# Patient Record
Sex: MALE | Race: WHITE | Employment: FULL TIME | URBAN - METROPOLITAN AREA
[De-identification: names, ages, dates, MRNs, and addresses within clinical notes are randomized per-mention and may not be internally consistent; named-entity substitution may affect disease eponyms.]

---

## 2017-04-14 ENCOUNTER — HOSPITAL ENCOUNTER (EMERGENCY)
Facility: HOSPITAL | Age: 52
Discharge: STILL ADMITTED OR ADMINISTRATIVE | End: 2017-04-14

## 2017-04-14 ENCOUNTER — HOSPITAL ENCOUNTER (EMERGENCY)
Facility: HOSPITAL | Age: 52
Discharge: HOME | End: 2017-04-14
Attending: EMERGENCY MEDICINE
Payer: COMMERCIAL

## 2017-04-14 ENCOUNTER — APPOINTMENT (OUTPATIENT)
Dept: CT IMAGING | Facility: HOSPITAL | Age: 52
End: 2017-04-14
Attending: EMERGENCY MEDICINE
Payer: COMMERCIAL

## 2017-04-14 VITALS
DIASTOLIC BLOOD PRESSURE: 92 MMHG | HEART RATE: 63 BPM | WEIGHT: 181 LBS | HEIGHT: 66 IN | BODY MASS INDEX: 29.09 KG/M2 | OXYGEN SATURATION: 99 % | SYSTOLIC BLOOD PRESSURE: 142 MMHG | RESPIRATION RATE: 14 BRPM | TEMPERATURE: 98 F

## 2017-04-14 DIAGNOSIS — I88.0 MESENTERIC ADENITIS: Primary | ICD-10-CM

## 2017-04-14 PROCEDURE — 99285 EMERGENCY DEPT VISIT HI MDM: CPT

## 2017-04-14 PROCEDURE — 96375 TX/PRO/DX INJ NEW DRUG ADDON: CPT

## 2017-04-14 PROCEDURE — 81003 URINALYSIS AUTO W/O SCOPE: CPT | Performed by: EMERGENCY MEDICINE

## 2017-04-14 PROCEDURE — 74176 CT ABD & PELVIS W/O CONTRAST: CPT

## 2017-04-14 PROCEDURE — 80053 COMPREHEN METABOLIC PANEL: CPT | Performed by: EMERGENCY MEDICINE

## 2017-04-14 PROCEDURE — 83690 ASSAY OF LIPASE: CPT | Performed by: EMERGENCY MEDICINE

## 2017-04-14 PROCEDURE — 99284 EMERGENCY DEPT VISIT MOD MDM: CPT

## 2017-04-14 PROCEDURE — 96376 TX/PRO/DX INJ SAME DRUG ADON: CPT

## 2017-04-14 PROCEDURE — 85025 COMPLETE CBC W/AUTO DIFF WBC: CPT | Performed by: EMERGENCY MEDICINE

## 2017-04-14 PROCEDURE — 96374 THER/PROPH/DIAG INJ IV PUSH: CPT

## 2017-04-14 RX ORDER — HYDROMORPHONE HYDROCHLORIDE 1 MG/ML
1 INJECTION, SOLUTION INTRAMUSCULAR; INTRAVENOUS; SUBCUTANEOUS ONCE
Status: COMPLETED | OUTPATIENT
Start: 2017-04-14 | End: 2017-04-14

## 2017-04-14 RX ORDER — KETOROLAC TROMETHAMINE 30 MG/ML
30 INJECTION, SOLUTION INTRAMUSCULAR; INTRAVENOUS ONCE
Status: COMPLETED | OUTPATIENT
Start: 2017-04-14 | End: 2017-04-14

## 2017-04-14 RX ORDER — TRAMADOL HYDROCHLORIDE 50 MG/1
50 TABLET ORAL EVERY 6 HOURS PRN
COMMUNITY

## 2017-04-14 RX ORDER — HYDROCODONE BITARTRATE AND ACETAMINOPHEN 5; 325 MG/1; MG/1
1-2 TABLET ORAL EVERY 4 HOURS PRN
Qty: 10 TABLET | Refills: 0 | Status: SHIPPED | OUTPATIENT
Start: 2017-04-14 | End: 2017-04-21

## 2017-04-14 RX ORDER — ONDANSETRON 2 MG/ML
4 INJECTION INTRAMUSCULAR; INTRAVENOUS ONCE
Status: COMPLETED | OUTPATIENT
Start: 2017-04-14 | End: 2017-04-14

## 2017-04-14 NOTE — ED NOTES
Received report from [facility]. Pt resting on stretcher with c/o left flank/back/testicle pain 6/10. Pt stating he was attempting to go to work when pain increased.  Stating he did not sleep last night due to pain onset 1300

## 2017-04-14 NOTE — ED INITIAL ASSESSMENT (HPI)
Patient is a 47 yo  male with c/o R flank pain since last night. Patient states that pain radiates from R flank to RLQ abd and R testicle. Patient states that he has past md hx of renal calculi and diverticulitis.

## 2017-04-14 NOTE — ED PROVIDER NOTES
Patient Seen in: [facility] Emergency Department    History   Patient presents with:  Abdomen/Flank Pain (GI/)    Stated Complaint: LEFT FLANK PAIN    HPI    35-year-old  male who presents emergency room today for complaint of left flank p above.    PSFH elements reviewed from today and agreed except as otherwise stated in HPI.     Physical Exam       ED Triage Vitals   BP 04/14/17 0640 145/91 mmHg   Pulse 04/14/17 0640 72   Resp 04/14/17 0640 18   Temp 04/14/17 0640 97.9 °F (36.6 °C)   Temp Please view results for these tests on the individual orders.    URINALYSIS WITH CULTURE REFLEX    CT scan of the abdomen pelvis reveals:       FINDINGS:    KIDNEYS:  Bilateral nonobstructing renal calcifications.    ADRENALS:  No mass or enl Patient states understanding these instructions and was discharged home.         Disposition and Plan     Clinical Impression:  Mesenteric adenitis  (primary encounter diagnosis)    Disposition:  Discharge    Follow-up:  Eddie Ojeda, [address]

## 2017-04-21 ENCOUNTER — CHARTING TRANS (OUTPATIENT)
Dept: OTHER | Age: 52
End: 2017-04-21

## 2017-04-21 ENCOUNTER — LAB SERVICES (OUTPATIENT)
Dept: OTHER | Age: 52
End: 2017-04-21

## 2017-04-21 ASSESSMENT — PAIN SCALES - GENERAL: PAINLEVEL_OUTOF10: 7

## 2017-04-22 ENCOUNTER — CHARTING TRANS (OUTPATIENT)
Dept: OTHER | Age: 52
End: 2017-04-22

## 2017-04-22 LAB
ALBUMIN SERPL-MCNC: 4.3 G/DL (ref 3.6–5.1)
ALBUMIN/GLOB SERPL: 1.5 (ref 1–2.4)
ALP SERPL-CCNC: 101 UNITS/L (ref 45–117)
ALT SERPL-CCNC: 28 UNITS/L
ANION GAP SERPL CALC-SCNC: 16 MMOL/L (ref 10–20)
APPEARANCE UR: CLEAR
AST SERPL-CCNC: 16 UNITS/L
BASOPHILS # BLD: 0.1 K/MCL (ref 0–0.3)
BASOPHILS NFR BLD: 1 %
BILIRUB SERPL-MCNC: 1.1 MG/DL (ref 0.2–1)
BILIRUB UR QL: NEGATIVE
BUN SERPL-MCNC: 10 MG/DL (ref 6–20)
BUN/CREAT SERPL: 13 (ref 7–25)
CALCIUM SERPL-MCNC: 9.3 MG/DL (ref 8.4–10.2)
CHLORIDE SERPL-SCNC: 106 MMOL/L (ref 98–107)
CHOLEST SERPL-MCNC: 195 MG/DL
CHOLEST/HDLC SERPL: 3.7
CO2 SERPL-SCNC: 25 MMOL/L (ref 21–32)
COLOR UR: YELLOW
CREAT SERPL-MCNC: 0.8 MG/DL (ref 0.67–1.17)
DIFFERENTIAL METHOD BLD: NORMAL
EOSINOPHIL # BLD: 0.2 K/MCL (ref 0.1–0.5)
EOSINOPHIL NFR BLD: 3 %
ERYTHROCYTE [DISTWIDTH] IN BLOOD: 13.4 % (ref 11–15)
GLOBULIN SER-MCNC: 2.9 G/DL (ref 2–4)
GLUCOSE SERPL-MCNC: 88 MG/DL (ref 65–99)
GLUCOSE UR-MCNC: NEGATIVE MG/DL
HDLC SERPL-MCNC: 53 MG/DL
HEMATOCRIT: 45.3 % (ref 39–51)
HEMOGLOBIN: 15.5 G/DL (ref 13–17)
KETONES UR-MCNC: NEGATIVE MG/DL
LDLC SERPL CALC-MCNC: 126 MG/DL
LENGTH OF FAST TIME PATIENT: 8 HRS
LENGTH OF FAST TIME PATIENT: 8 HRS
LYMPHOCYTES # BLD: 2.3 K/MCL (ref 1–4)
LYMPHOCYTES NFR BLD: 27 %
MEAN CORPUSCULAR HEMOGLOBIN: 29.4 PG (ref 26–34)
MEAN CORPUSCULAR HGB CONC: 34.2 G/DL (ref 32–36.5)
MEAN CORPUSCULAR VOLUME: 86 FL (ref 78–100)
MONOCYTES # BLD: 0.6 K/MCL (ref 0.3–0.9)
MONOCYTES NFR BLD: 7 %
NEUTROPHILS # BLD: 5.5 K/MCL (ref 1.8–7.7)
NEUTROPHILS NFR BLD: 62 %
NITRITE UR QL: NEGATIVE
NONHDLC SERPL-MCNC: 142 MG/DL
PH UR: 6 UNITS (ref 5–7)
PLATELET COUNT: 160 K/MCL (ref 140–450)
POTASSIUM SERPL-SCNC: 4.7 MMOL/L (ref 3.4–5.1)
PROT UR QL: NEGATIVE MG/DL
PSA SERPL-MCNC: 0.85 NG/ML
RBC-URINE: NEGATIVE
RED CELL COUNT: 5.27 MIL/MCL (ref 4.5–5.9)
SODIUM SERPL-SCNC: 142 MMOL/L (ref 135–145)
SP GR UR: 1.02 (ref 1–1.03)
SPECIMEN SOURCE: NORMAL
TOTAL PROTEIN: 7.2 G/DL (ref 6.4–8.2)
TRIGL SERPL-MCNC: 82 MG/DL
TSH SERPL-ACNC: 0.88 MCUNITS/ML (ref 0.35–5)
UROBILINOGEN UR QL: 0.2 MG/DL (ref 0–1)
WBC-URINE: NEGATIVE
WHITE BLOOD COUNT: 8.7 K/MCL (ref 4.2–11)

## 2017-05-04 ENCOUNTER — LAB SERVICES (OUTPATIENT)
Dept: OTHER | Age: 52
End: 2017-05-04

## 2017-05-04 ENCOUNTER — HOSPITAL (OUTPATIENT)
Dept: OTHER | Age: 52
End: 2017-05-04
Attending: FAMILY MEDICINE

## 2017-05-04 ENCOUNTER — IMAGING SERVICES (OUTPATIENT)
Dept: OTHER | Age: 52
End: 2017-05-04

## 2017-05-04 LAB
ANALYZER ANC (IANC): ABNORMAL
ANALYZER ANC (IANC): ABNORMAL
ANION GAP SERPL CALC-SCNC: 18 MMOL/L (ref 10–20)
ANION GAP SERPL CALC-SCNC: 18 MMOL/L (ref 10–20)
BASO+EOS+MONOS # BLD: 0.6 K/MCL (ref 0.1–1.1)
BASO+EOS+MONOS # BLD: 0.6 THOUSAND/MCL (ref 0.1–1.1)
BASO+EOS+MONOS NFR BLD: 5 %
BASO+EOS+MONOS NFR BLD: 5 %
BUN SERPL-MCNC: 11 MG/DL (ref 6–20)
BUN SERPL-MCNC: 11 MG/DL (ref 6–20)
BUN/CREAT SERPL: 12 (ref 7–25)
BUN/CREAT SERPL: 12 (ref 7–25)
CHLORIDE SERPL-SCNC: 101 MMOL/L (ref 98–107)
CHLORIDE: 101 MMOL/L (ref 98–107)
CO2 SERPL-SCNC: 26 MMOL/L (ref 21–32)
CO2 SERPL-SCNC: 26 MMOL/L (ref 21–32)
CREAT SERPL-MCNC: 0.9 MG/DL (ref 0.67–1.17)
CREAT SERPL-MCNC: 0.9 MG/DL (ref 0.67–1.17)
DIFFERENTIAL METHOD BLD: ABNORMAL
DIFFERENTIAL METHOD BLD: ABNORMAL
ERYTHROCYTE [DISTWIDTH] IN BLOOD: 13.7 % (ref 11–15)
ERYTHROCYTE [DISTWIDTH] IN BLOOD: 13.7 % (ref 11–15)
GLUCOSE SERPL-MCNC: 101 MG/DL (ref 65–99)
GLUCOSE SERPL-MCNC: 101 MG/DL (ref 65–99)
HEMATOCRIT: 44 % (ref 39–51)
HEMATOCRIT: 44 % (ref 39–51)
HEMOGLOBIN: 15.6 G/DL (ref 13–17)
HGB BLD-MCNC: 15.6 GM/DL (ref 13–17)
LYMPHOCYTES # BLD: 2.8 K/MCL (ref 1–4)
LYMPHOCYTES # BLD: 2.8 THOUSAND/MCL (ref 1–4)
LYMPHOCYTES NFR BLD: 25 %
LYMPHOCYTES NFR BLD: 25 %
MCH RBC QN AUTO: 29.8 PG (ref 26–34)
MCHC RBC AUTO-ENTMCNC: 35.5 GM/DL (ref 32–36.5)
MCV RBC AUTO: 84 FL (ref 78–100)
MEAN CORPUSCULAR HEMOGLOBIN: 29.8 PG (ref 26–34)
MEAN CORPUSCULAR HGB CONC: 35.5 G/DL (ref 32–36.5)
MEAN CORPUSCULAR VOLUME: 84 FL (ref 78–100)
NEUTROPHILS # BLD: 8.1 K/MCL (ref 1.8–7.7)
NEUTROPHILS # BLD: 8.1 THOUSAND/MCL (ref 1.8–7.7)
NEUTROPHILS NFR BLD: 70 %
NEUTROPHILS NFR BLD: 70 %
NEUTS SEG NFR BLD: ABNORMAL
NEUTS SEG NFR BLD: ABNORMAL %
PLATELET # BLD: 144 THOUSAND/MCL (ref 140–450)
PLATELET COUNT: 144 K/MCL (ref 140–450)
POTASSIUM SERPL-SCNC: 4.1 MMOL/L (ref 3.4–5.1)
POTASSIUM SERPL-SCNC: 4.1 MMOL/L (ref 3.4–5.1)
RBC # BLD: 5.24 MILLION/MCL (ref 4.5–5.9)
RED CELL COUNT: 5.24 MIL/MCL (ref 4.5–5.9)
SODIUM SERPL-SCNC: 141 MMOL/L (ref 135–145)
SODIUM SERPL-SCNC: 141 MMOL/L (ref 135–145)
WBC # BLD: 11.5 THOUSAND/MCL (ref 4.2–11)
WHITE BLOOD COUNT: 11.5 K/MCL (ref 4.2–11)

## 2017-06-12 ENCOUNTER — HOSPITAL ENCOUNTER (EMERGENCY)
Facility: HOSPITAL | Age: 52
Discharge: HOME | End: 2017-06-12
Attending: EMERGENCY MEDICINE
Payer: COMMERCIAL

## 2017-06-12 ENCOUNTER — HOSPITAL ENCOUNTER (EMERGENCY)
Facility: HOSPITAL | Age: 52
Discharge: STILL ADMITTED OR ADMINISTRATIVE | End: 2017-06-12

## 2017-06-12 ENCOUNTER — APPOINTMENT (OUTPATIENT)
Dept: CT IMAGING | Facility: HOSPITAL | Age: 52
End: 2017-06-12
Attending: EMERGENCY MEDICINE
Payer: COMMERCIAL

## 2017-06-12 ENCOUNTER — APPOINTMENT (OUTPATIENT)
Dept: ULTRASOUND IMAGING | Facility: HOSPITAL | Age: 52
End: 2017-06-12
Attending: EMERGENCY MEDICINE
Payer: COMMERCIAL

## 2017-06-12 VITALS
HEIGHT: 66 IN | OXYGEN SATURATION: 98 % | DIASTOLIC BLOOD PRESSURE: 95 MMHG | WEIGHT: 186 LBS | HEART RATE: 65 BPM | SYSTOLIC BLOOD PRESSURE: 125 MMHG | TEMPERATURE: 97 F | BODY MASS INDEX: 29.89 KG/M2 | RESPIRATION RATE: 16 BRPM

## 2017-06-12 DIAGNOSIS — R10.13 EPIGASTRIC PAIN: Primary | ICD-10-CM

## 2017-06-12 PROCEDURE — 96374 THER/PROPH/DIAG INJ IV PUSH: CPT

## 2017-06-12 PROCEDURE — 96361 HYDRATE IV INFUSION ADD-ON: CPT

## 2017-06-12 PROCEDURE — 84484 ASSAY OF TROPONIN QUANT: CPT | Performed by: EMERGENCY MEDICINE

## 2017-06-12 PROCEDURE — 96376 TX/PRO/DX INJ SAME DRUG ADON: CPT

## 2017-06-12 PROCEDURE — 85025 COMPLETE CBC W/AUTO DIFF WBC: CPT | Performed by: EMERGENCY MEDICINE

## 2017-06-12 PROCEDURE — 96375 TX/PRO/DX INJ NEW DRUG ADDON: CPT

## 2017-06-12 PROCEDURE — 80053 COMPREHEN METABOLIC PANEL: CPT | Performed by: EMERGENCY MEDICINE

## 2017-06-12 PROCEDURE — 83690 ASSAY OF LIPASE: CPT | Performed by: EMERGENCY MEDICINE

## 2017-06-12 PROCEDURE — 76700 US EXAM ABDOM COMPLETE: CPT | Performed by: EMERGENCY MEDICINE

## 2017-06-12 PROCEDURE — 99285 EMERGENCY DEPT VISIT HI MDM: CPT

## 2017-06-12 PROCEDURE — 81001 URINALYSIS AUTO W/SCOPE: CPT | Performed by: EMERGENCY MEDICINE

## 2017-06-12 PROCEDURE — 93005 ELECTROCARDIOGRAM TRACING: CPT

## 2017-06-12 PROCEDURE — 93010 ELECTROCARDIOGRAM REPORT: CPT

## 2017-06-12 PROCEDURE — 74176 CT ABD & PELVIS W/O CONTRAST: CPT | Performed by: EMERGENCY MEDICINE

## 2017-06-12 PROCEDURE — 87086 URINE CULTURE/COLONY COUNT: CPT | Performed by: EMERGENCY MEDICINE

## 2017-06-12 RX ORDER — HYDROMORPHONE HYDROCHLORIDE 1 MG/ML
1 INJECTION, SOLUTION INTRAMUSCULAR; INTRAVENOUS; SUBCUTANEOUS EVERY 30 MIN PRN
Status: DISCONTINUED | OUTPATIENT
Start: 2017-06-12 | End: 2017-06-13

## 2017-06-12 RX ORDER — HYDROCODONE BITARTRATE AND ACETAMINOPHEN 5; 325 MG/1; MG/1
1-2 TABLET ORAL EVERY 4 HOURS PRN
Qty: 20 TABLET | Refills: 0 | Status: SHIPPED | OUTPATIENT
Start: 2017-06-12 | End: 2017-06-19

## 2017-06-12 RX ORDER — KETOROLAC TROMETHAMINE 30 MG/ML
30 INJECTION, SOLUTION INTRAMUSCULAR; INTRAVENOUS ONCE
Status: COMPLETED | OUTPATIENT
Start: 2017-06-12 | End: 2017-06-12

## 2017-06-12 RX ORDER — ONDANSETRON 2 MG/ML
4 INJECTION INTRAMUSCULAR; INTRAVENOUS ONCE
Status: COMPLETED | OUTPATIENT
Start: 2017-06-12 | End: 2017-06-12

## 2017-06-12 RX ORDER — SODIUM CHLORIDE 9 MG/ML
INJECTION, SOLUTION INTRAVENOUS ONCE
Status: COMPLETED | OUTPATIENT
Start: 2017-06-12 | End: 2017-06-12

## 2017-06-13 NOTE — ED PROVIDER NOTES
Patient Seen in: [facility] Emergency Department    History   Patient presents with:  Abdomen/Flank Pain (GI/)    Stated Complaint: ABD PAIN    HPI  Patient is a 51-year-old male who states that 4 PM today had acute onset of epigastric right upper ED Triage Vitals   BP 06/12/17 1946 171/85 mmHg   Pulse 06/12/17 1946 73   Resp 06/12/17 1946 16   Temp 06/12/17 1946 97.3 °F (36.3 °C)   Temp src 06/12/17 1946 Temporal   SpO2 06/12/17 1946 100 %   O2 Device 06/12/17 1946 None (Room air)       Current Final result                 Please view results for these tests on the individual orders.    RAINBOW DRAW BLUE   RAINBOW DRAW GOLD   RAINBOW DRAW LAVENDER   RAINBOW DRAW LIGHT GREEN   URINE CULTURE, ROUTINE   CBC W/ DIFFERENTIAL      EKG    Rate, intervals Impression:  Epigastric pain  (primary encounter diagnosis)    Disposition:  Discharge    Follow-up:  Cedric Hathaway, [address] [place] [address]  [phone]    In 2 days        Medications Prescribed:  Discharge Medication List as of 6/12/

## 2017-08-07 ENCOUNTER — HOSPITAL (OUTPATIENT)
Dept: OTHER | Age: 52
End: 2017-08-07
Attending: EMERGENCY MEDICINE

## 2017-08-13 ENCOUNTER — HOSPITAL ENCOUNTER (EMERGENCY)
Facility: HOSPITAL | Age: 52
Discharge: HOME | End: 2017-08-14
Attending: EMERGENCY MEDICINE
Payer: COMMERCIAL

## 2017-08-13 DIAGNOSIS — K80.50 BILIARY COLIC: Primary | ICD-10-CM

## 2017-08-13 PROCEDURE — 99285 EMERGENCY DEPT VISIT HI MDM: CPT

## 2017-08-13 PROCEDURE — 83690 ASSAY OF LIPASE: CPT | Performed by: EMERGENCY MEDICINE

## 2017-08-13 PROCEDURE — 96376 TX/PRO/DX INJ SAME DRUG ADON: CPT

## 2017-08-13 PROCEDURE — 85025 COMPLETE CBC W/AUTO DIFF WBC: CPT | Performed by: EMERGENCY MEDICINE

## 2017-08-13 PROCEDURE — 96375 TX/PRO/DX INJ NEW DRUG ADDON: CPT

## 2017-08-13 PROCEDURE — 81001 URINALYSIS AUTO W/SCOPE: CPT | Performed by: EMERGENCY MEDICINE

## 2017-08-13 PROCEDURE — 96361 HYDRATE IV INFUSION ADD-ON: CPT

## 2017-08-13 PROCEDURE — 96374 THER/PROPH/DIAG INJ IV PUSH: CPT

## 2017-08-13 PROCEDURE — 93010 ELECTROCARDIOGRAM REPORT: CPT

## 2017-08-13 PROCEDURE — 80053 COMPREHEN METABOLIC PANEL: CPT | Performed by: EMERGENCY MEDICINE

## 2017-08-13 PROCEDURE — 93005 ELECTROCARDIOGRAM TRACING: CPT

## 2017-08-14 ENCOUNTER — APPOINTMENT (OUTPATIENT)
Dept: ULTRASOUND IMAGING | Facility: HOSPITAL | Age: 52
End: 2017-08-14
Attending: EMERGENCY MEDICINE
Payer: COMMERCIAL

## 2017-08-14 VITALS
DIASTOLIC BLOOD PRESSURE: 81 MMHG | BODY MASS INDEX: 30.22 KG/M2 | HEART RATE: 63 BPM | RESPIRATION RATE: 15 BRPM | WEIGHT: 188 LBS | SYSTOLIC BLOOD PRESSURE: 110 MMHG | HEIGHT: 66 IN | OXYGEN SATURATION: 99 % | TEMPERATURE: 98 F

## 2017-08-14 LAB
ALBUMIN SERPL-MCNC: 3.8 G/DL (ref 3.5–4.8)
ALP LIVER SERPL-CCNC: 90 U/L (ref 45–117)
ALT SERPL-CCNC: 28 U/L (ref 17–63)
AST SERPL-CCNC: 19 U/L (ref 15–41)
ATRIAL RATE: 68 BPM
BASOPHILS # BLD AUTO: 0.09 X10(3) UL (ref 0–0.1)
BASOPHILS NFR BLD AUTO: 0.9 %
BILIRUB SERPL-MCNC: 0.8 MG/DL (ref 0.1–2)
BILIRUB UR QL STRIP.AUTO: NEGATIVE
BUN BLD-MCNC: 7 MG/DL (ref 8–20)
CALCIUM BLD-MCNC: 8.9 MG/DL (ref 8.3–10.3)
CHLORIDE: 107 MMOL/L (ref 101–111)
CLARITY UR REFRACT.AUTO: CLEAR
CO2: 26 MMOL/L (ref 22–32)
CREAT BLD-MCNC: 0.79 MG/DL (ref 0.7–1.3)
EOSINOPHIL # BLD AUTO: 0.32 X10(3) UL (ref 0–0.3)
EOSINOPHIL NFR BLD AUTO: 3.2 %
ERYTHROCYTE [DISTWIDTH] IN BLOOD BY AUTOMATED COUNT: 13.4 % (ref 11.5–16)
GLUCOSE BLD-MCNC: 131 MG/DL (ref 70–99)
GLUCOSE UR STRIP.AUTO-MCNC: NEGATIVE MG/DL
HCT VFR BLD AUTO: 41.9 % (ref 37–53)
HGB BLD-MCNC: 14.3 G/DL (ref 13–17)
IMMATURE GRANULOCYTE COUNT: 0.03 X10(3) UL (ref 0–1)
IMMATURE GRANULOCYTE RATIO %: 0.3 %
KETONES UR STRIP.AUTO-MCNC: NEGATIVE MG/DL
LEUKOCYTE ESTERASE UR QL STRIP.AUTO: NEGATIVE
LIPASE: 159 U/L (ref 73–393)
LYMPHOCYTES # BLD AUTO: 2.87 X10(3) UL (ref 0.9–4)
LYMPHOCYTES NFR BLD AUTO: 28.8 %
M PROTEIN MFR SERPL ELPH: 7.2 G/DL (ref 6.1–8.3)
MCH RBC QN AUTO: 29.1 PG (ref 27–33.2)
MCHC RBC AUTO-ENTMCNC: 34.1 G/DL (ref 31–37)
MCV RBC AUTO: 85.2 FL (ref 80–99)
MONOCYTES # BLD AUTO: 0.7 X10(3) UL (ref 0.1–0.6)
MONOCYTES NFR BLD AUTO: 7 %
NEUTROPHIL ABS PRELIM: 5.95 X10 (3) UL (ref 1.3–6.7)
NEUTROPHILS # BLD AUTO: 5.95 X10(3) UL (ref 1.3–6.7)
NEUTROPHILS NFR BLD AUTO: 59.8 %
NITRITE UR QL STRIP.AUTO: NEGATIVE
P AXIS: 24 DEGREES
P-R INTERVAL: 142 MS
PH UR STRIP.AUTO: 7 [PH] (ref 4.5–8)
PLATELET # BLD AUTO: 172 10(3)UL (ref 150–450)
POTASSIUM SERPL-SCNC: 3.8 MMOL/L (ref 3.6–5.1)
PROT UR STRIP.AUTO-MCNC: NEGATIVE MG/DL
Q-T INTERVAL: 370 MS
QRS DURATION: 92 MS
QTC CALCULATION (BEZET): 393 MS
R AXIS: 7 DEGREES
RBC # BLD AUTO: 4.92 X10(6)UL (ref 4.3–5.7)
RED CELL DISTRIBUTION WIDTH-SD: 41.7 FL (ref 35.1–46.3)
SODIUM SERPL-SCNC: 142 MMOL/L (ref 136–144)
SP GR UR STRIP.AUTO: 1.01 (ref 1–1.03)
T AXIS: 33 DEGREES
UROBILINOGEN UR STRIP.AUTO-MCNC: <2 MG/DL
VENTRICULAR RATE: 68 BPM
WBC # BLD AUTO: 10 X10(3) UL (ref 4–13)

## 2017-08-14 PROCEDURE — 76700 US EXAM ABDOM COMPLETE: CPT | Performed by: EMERGENCY MEDICINE

## 2017-08-14 RX ORDER — ONDANSETRON 2 MG/ML
4 INJECTION INTRAMUSCULAR; INTRAVENOUS ONCE
Status: COMPLETED | OUTPATIENT
Start: 2017-08-14 | End: 2017-08-14

## 2017-08-14 RX ORDER — HYDROCODONE BITARTRATE AND ACETAMINOPHEN 5; 325 MG/1; MG/1
1-2 TABLET ORAL EVERY 6 HOURS PRN
Qty: 20 TABLET | Refills: 0 | Status: SHIPPED | OUTPATIENT
Start: 2017-08-14 | End: 2017-08-21

## 2017-08-14 RX ORDER — HYDROMORPHONE HYDROCHLORIDE 1 MG/ML
1 INJECTION, SOLUTION INTRAMUSCULAR; INTRAVENOUS; SUBCUTANEOUS EVERY 30 MIN PRN
Status: DISCONTINUED | OUTPATIENT
Start: 2017-08-14 | End: 2017-08-14

## 2017-08-14 NOTE — ED PROVIDER NOTES
Patient Seen in: [facility] Emergency Department    History   Patient presents with:  Abdomen/Flank Pain (GI/)    Stated Complaint: abd pain    HPI    Patient is a 51-year-old male complaining of 2 hours of severe right upper quadrant nonradiating as otherwise stated in HPI.     Physical Exam   ED Triage Vitals [08/13/17 2340]  BP: 157/94  Pulse: 73  Resp: 20  Temp: 97.8 °F (36.6 °C)  Temp src: Temporal  SpO2: 99 %  O2 Device: None (Room air)    Current:/81   Pulse 63   Temp 97.8 °F (36.6 °C) ( Normal   CBC WITH DIFFERENTIAL WITH PLATELET    Narrative: The following orders were created for panel order CBC WITH DIFFERENTIAL WITH PLATELET.   Procedure                               Abnormality         Status                     --------- and Plan     Clinical Impression:  Biliary colic  (primary encounter diagnosis)    Disposition:  Discharge    Follow-up:  Yasmin Shin [address] [place] [address]  [phone]    Call in 1 day        Medications Prescribed:

## 2017-08-14 NOTE — ED INITIAL ASSESSMENT (HPI)
PT here with c/o abdominal pain for 2 hours. Pt presently on Amoxicillin for dental infection. Pt describes pain as epigastric.

## 2017-08-19 ENCOUNTER — LAB SERVICES (OUTPATIENT)
Dept: OTHER | Age: 52
End: 2017-08-19

## 2017-08-19 LAB
ALBUMIN SERPL-MCNC: 4.2 G/DL (ref 3.6–5.1)
ALBUMIN SERPL-MCNC: 4.2 GM/DL (ref 3.6–5.1)
ALP SERPL-CCNC: 105 UNIT/L (ref 45–117)
ALP SERPL-CCNC: 105 UNITS/L (ref 45–117)
ALT SERPL-CCNC: 25 UNIT/L
ALT SERPL-CCNC: 25 UNITS/L
ANALYZER ANC (IANC): NORMAL
ANALYZER ANC (IANC): NORMAL
ANION GAP SERPL CALC-SCNC: 14 MMOL/L (ref 10–20)
ANION GAP SERPL CALC-SCNC: 14 MMOL/L (ref 10–20)
AST SERPL-CCNC: 26 UNIT/L
AST SERPL-CCNC: 26 UNITS/L
BASOPHILS # BLD: 0.1 K/MCL (ref 0–0.3)
BASOPHILS # BLD: 0.1 THOUSAND/MCL (ref 0–0.3)
BASOPHILS NFR BLD: 1 %
BASOPHILS NFR BLD: 1 %
BILIRUB CONJ SERPL-MCNC: <0.1 MG/DL (ref 0–0.2)
BILIRUB CONJ SERPL-MCNC: <0.1 MG/DL (ref 0–0.2)
BILIRUB SERPL-MCNC: 0.9 MG/DL (ref 0.2–1)
BILIRUB SERPL-MCNC: 0.9 MG/DL (ref 0.2–1)
BUN SERPL-MCNC: 7 MG/DL (ref 6–20)
BUN SERPL-MCNC: 7 MG/DL (ref 6–20)
BUN/CREAT SERPL: 9 (ref 7–25)
BUN/CREAT SERPL: 9 (ref 7–25)
CALCIUM SERPL-MCNC: 9.4 MG/DL (ref 8.4–10.2)
CALCIUM SERPL-MCNC: 9.4 MG/DL (ref 8.4–10.2)
CHLORIDE SERPL-SCNC: 104 MMOL/L (ref 98–107)
CHLORIDE: 104 MMOL/L (ref 98–107)
CO2 SERPL-SCNC: 29 MMOL/L (ref 21–32)
CO2 SERPL-SCNC: 29 MMOL/L (ref 21–32)
CREAT SERPL-MCNC: 0.74 MG/DL (ref 0.67–1.17)
CREAT SERPL-MCNC: 0.74 MG/DL (ref 0.67–1.17)
DIFFERENTIAL METHOD BLD: NORMAL
DIFFERENTIAL METHOD BLD: NORMAL
EOSINOPHIL # BLD: 0.4 K/MCL (ref 0.1–0.5)
EOSINOPHIL # BLD: 0.4 THOUSAND/MCL (ref 0.1–0.5)
EOSINOPHIL NFR BLD: 4 %
EOSINOPHIL NFR BLD: 4 %
ERYTHROCYTE [DISTWIDTH] IN BLOOD: 13.1 % (ref 11–15)
ERYTHROCYTE [DISTWIDTH] IN BLOOD: 13.1 % (ref 11–15)
GLUCOSE SERPL-MCNC: 117 MG/DL (ref 65–99)
GLUCOSE SERPL-MCNC: 117 MG/DL (ref 65–99)
HEMATOCRIT: 43.8 % (ref 39–51)
HEMATOCRIT: 43.8 % (ref 39–51)
HEMOGLOBIN: 15.4 G/DL (ref 13–17)
HGB BLD-MCNC: 15.4 GM/DL (ref 13–17)
LIPASE SERPL-CCNC: 224 UNIT/L (ref 73–393)
LIPASE SERPL-CCNC: 224 UNITS/L (ref 73–393)
LYMPHOCYTES # BLD: 3.4 K/MCL (ref 1–4)
LYMPHOCYTES # BLD: 3.4 THOUSAND/MCL (ref 1–4)
LYMPHOCYTES NFR BLD: 35 %
LYMPHOCYTES NFR BLD: 35 %
MCH RBC QN AUTO: 29.9 PG (ref 26–34)
MCHC RBC AUTO-ENTMCNC: 35.2 GM/DL (ref 32–36.5)
MCV RBC AUTO: 85 FL (ref 78–100)
MEAN CORPUSCULAR HEMOGLOBIN: 29.9 PG (ref 26–34)
MEAN CORPUSCULAR HGB CONC: 35.2 G/DL (ref 32–36.5)
MEAN CORPUSCULAR VOLUME: 85 FL (ref 78–100)
MONOCYTES # BLD: 0.7 K/MCL (ref 0.3–0.9)
MONOCYTES # BLD: 0.7 THOUSAND/MCL (ref 0.3–0.9)
MONOCYTES NFR BLD: 7 %
MONOCYTES NFR BLD: 7 %
NEUTROPHILS # BLD: 5.2 K/MCL (ref 1.8–7.7)
NEUTROPHILS # BLD: 5.2 THOUSAND/MCL (ref 1.8–7.7)
NEUTROPHILS NFR BLD: 53 %
NEUTROPHILS NFR BLD: 53 %
NEUTS SEG NFR BLD: NORMAL
NEUTS SEG NFR BLD: NORMAL %
NRBC (NRBCRE): NORMAL
PERCENT NRBC: NORMAL
PLATELET # BLD: 190 THOUSAND/MCL (ref 140–450)
PLATELET COUNT: 190 K/MCL (ref 140–450)
POTASSIUM SERPL-SCNC: 3.7 MMOL/L (ref 3.4–5.1)
POTASSIUM SERPL-SCNC: 3.7 MMOL/L (ref 3.4–5.1)
PROT SERPL-MCNC: 7.7 GM/DL (ref 6.4–8.2)
RBC # BLD: 5.15 MILLION/MCL (ref 4.5–5.9)
RED CELL COUNT: 5.15 MIL/MCL (ref 4.5–5.9)
SODIUM SERPL-SCNC: 143 MMOL/L (ref 135–145)
SODIUM SERPL-SCNC: 143 MMOL/L (ref 135–145)
TOTAL PROTEIN: 7.7 G/DL (ref 6.4–8.2)
TROPONIN I SERPL HS-MCNC: <0.02 NG/ML
TROPONIN I SERPL HS-MCNC: <0.02 NG/ML
WBC # BLD: 9.6 THOUSAND/MCL (ref 4.2–11)
WHITE BLOOD COUNT: 9.6 K/MCL (ref 4.2–11)

## 2017-08-20 ENCOUNTER — IMAGING SERVICES (OUTPATIENT)
Dept: OTHER | Age: 52
End: 2017-08-20

## 2017-08-20 ENCOUNTER — DIAGNOSTIC TRANS (OUTPATIENT)
Dept: OTHER | Age: 52
End: 2017-08-20

## 2017-08-20 ENCOUNTER — HOSPITAL (OUTPATIENT)
Dept: OTHER | Age: 52
End: 2017-08-20
Attending: INTERNAL MEDICINE

## 2017-08-20 LAB
ALBUMIN SERPL-MCNC: 3.3 GM/DL (ref 3.6–5.1)
ALBUMIN/GLOB SERPL: 1.1 {RATIO} (ref 1–2.4)
ALP SERPL-CCNC: 77 UNIT/L (ref 45–117)
ALT SERPL-CCNC: 21 UNIT/L
ANALYZER ANC (IANC): ABNORMAL
ANION GAP SERPL CALC-SCNC: 9 MMOL/L (ref 10–20)
AST SERPL-CCNC: 15 UNIT/L
BILIRUB SERPL-MCNC: 0.8 MG/DL (ref 0.2–1)
BUN SERPL-MCNC: 5 MG/DL (ref 6–20)
BUN/CREAT SERPL: 7 (ref 7–25)
CALCIUM SERPL-MCNC: 7.8 MG/DL (ref 8.4–10.2)
CHLORIDE: 109 MMOL/L (ref 98–107)
CO2 SERPL-SCNC: 30 MMOL/L (ref 21–32)
CREAT SERPL-MCNC: 0.68 MG/DL (ref 0.67–1.17)
ERYTHROCYTE [DISTWIDTH] IN BLOOD: 13.5 % (ref 11–15)
GLOBULIN SER-MCNC: 2.9 GM/DL (ref 2–4)
GLUCOSE SERPL-MCNC: 103 MG/DL (ref 65–99)
HEMATOCRIT: 38.7 % (ref 39–51)
HGB BLD-MCNC: 13.5 GM/DL (ref 13–17)
MCH RBC QN AUTO: 29.8 PG (ref 26–34)
MCHC RBC AUTO-ENTMCNC: 34.9 GM/DL (ref 32–36.5)
MCV RBC AUTO: 85.4 FL (ref 78–100)
PLATELET # BLD: 154 THOUSAND/MCL (ref 140–450)
POTASSIUM SERPL-SCNC: 3.7 MMOL/L (ref 3.4–5.1)
PROT SERPL-MCNC: 6.2 GM/DL (ref 6.4–8.2)
RBC # BLD: 4.53 MILLION/MCL (ref 4.5–5.9)
SODIUM SERPL-SCNC: 144 MMOL/L (ref 135–145)
WBC # BLD: 7.9 THOUSAND/MCL (ref 4.2–11)

## 2017-08-21 ENCOUNTER — CHARTING TRANS (OUTPATIENT)
Dept: OTHER | Age: 52
End: 2017-08-21

## 2017-08-21 LAB
CREAT SERPL-MCNC: 0.77 MG/DL (ref 0.67–1.17)
MAGNESIUM SERPL-MCNC: 1.9 MG/DL (ref 1.7–2.4)
POTASSIUM SERPL-SCNC: 3.7 MMOL/L (ref 3.4–5.1)

## 2017-09-12 ENCOUNTER — CHARTING TRANS (OUTPATIENT)
Dept: OTHER | Age: 52
End: 2017-09-12

## 2017-10-23 ENCOUNTER — CHARTING TRANS (OUTPATIENT)
Dept: OTHER | Age: 52
End: 2017-10-23

## 2017-11-02 ENCOUNTER — HOSPITAL (OUTPATIENT)
Dept: OTHER | Age: 52
End: 2017-11-02
Attending: EMERGENCY MEDICINE

## 2017-11-02 ENCOUNTER — IMAGING SERVICES (OUTPATIENT)
Dept: OTHER | Age: 52
End: 2017-11-02

## 2017-11-02 ENCOUNTER — CHARTING TRANS (OUTPATIENT)
Dept: OTHER | Age: 52
End: 2017-11-02

## 2017-11-02 ENCOUNTER — LAB SERVICES (OUTPATIENT)
Dept: OTHER | Age: 52
End: 2017-11-02

## 2017-11-02 LAB
ANALYZER ANC (IANC): NORMAL
ANALYZER ANC (IANC): NORMAL
ANION GAP SERPL CALC-SCNC: 13 MMOL/L (ref 10–20)
ANION GAP SERPL CALC-SCNC: 13 MMOL/L (ref 10–20)
BASOPHILS # BLD: 0.1 K/MCL (ref 0–0.3)
BASOPHILS # BLD: 0.1 THOUSAND/MCL (ref 0–0.3)
BASOPHILS NFR BLD: 1 %
BASOPHILS NFR BLD: 1 %
BUN SERPL-MCNC: 9 MG/DL (ref 6–20)
BUN SERPL-MCNC: 9 MG/DL (ref 6–20)
BUN/CREAT SERPL: 12 (ref 7–25)
BUN/CREAT SERPL: 12 (ref 7–25)
CALCIUM SERPL-MCNC: 9.1 MG/DL (ref 8.4–10.2)
CALCIUM SERPL-MCNC: 9.1 MG/DL (ref 8.4–10.2)
CHLORIDE SERPL-SCNC: 108 MMOL/L (ref 98–107)
CHLORIDE: 108 MMOL/L (ref 98–107)
CO2 SERPL-SCNC: 27 MMOL/L (ref 21–32)
CO2 SERPL-SCNC: 27 MMOL/L (ref 21–32)
CREAT SERPL-MCNC: 0.74 MG/DL (ref 0.67–1.17)
CREAT SERPL-MCNC: 0.74 MG/DL (ref 0.67–1.17)
DIFFERENTIAL METHOD BLD: NORMAL
DIFFERENTIAL METHOD BLD: NORMAL
EOSINOPHIL # BLD: 0.4 K/MCL (ref 0.1–0.5)
EOSINOPHIL # BLD: 0.4 THOUSAND/MCL (ref 0.1–0.5)
EOSINOPHIL NFR BLD: 4 %
EOSINOPHIL NFR BLD: 4 %
ERYTHROCYTE [DISTWIDTH] IN BLOOD: 13.3 % (ref 11–15)
ERYTHROCYTE [DISTWIDTH] IN BLOOD: 13.3 % (ref 11–15)
GLUCOSE SERPL-MCNC: 97 MG/DL (ref 65–99)
GLUCOSE SERPL-MCNC: 97 MG/DL (ref 65–99)
HEMATOCRIT: 45.8 % (ref 39–51)
HEMATOCRIT: 45.8 % (ref 39–51)
HEMOGLOBIN: 15.7 G/DL (ref 13–17)
HGB BLD-MCNC: 15.7 GM/DL (ref 13–17)
LYMPHOCYTES # BLD: 2.6 K/MCL (ref 1–4)
LYMPHOCYTES # BLD: 2.6 THOUSAND/MCL (ref 1–4)
LYMPHOCYTES NFR BLD: 27 %
LYMPHOCYTES NFR BLD: 27 %
MCH RBC QN AUTO: 29.1 PG (ref 26–34)
MCHC RBC AUTO-ENTMCNC: 34.3 GM/DL (ref 32–36.5)
MCV RBC AUTO: 85 FL (ref 78–100)
MEAN CORPUSCULAR HEMOGLOBIN: 29.1 PG (ref 26–34)
MEAN CORPUSCULAR HGB CONC: 34.3 G/DL (ref 32–36.5)
MEAN CORPUSCULAR VOLUME: 85 FL (ref 78–100)
MONOCYTES # BLD: 0.7 K/MCL (ref 0.3–0.9)
MONOCYTES # BLD: 0.7 THOUSAND/MCL (ref 0.3–0.9)
MONOCYTES NFR BLD: 8 %
MONOCYTES NFR BLD: 8 %
NEUTROPHILS # BLD: 5.9 K/MCL (ref 1.8–7.7)
NEUTROPHILS # BLD: 5.9 THOUSAND/MCL (ref 1.8–7.7)
NEUTROPHILS NFR BLD: 60 %
NEUTROPHILS NFR BLD: 60 %
NEUTS SEG NFR BLD: NORMAL
NEUTS SEG NFR BLD: NORMAL %
NRBC (NRBCRE): NORMAL
PERCENT NRBC: NORMAL
PLATELET # BLD: 166 THOUSAND/MCL (ref 140–450)
PLATELET COUNT: 166 K/MCL (ref 140–450)
POTASSIUM SERPL-SCNC: 3.8 MMOL/L (ref 3.4–5.1)
POTASSIUM SERPL-SCNC: 3.8 MMOL/L (ref 3.4–5.1)
RBC # BLD: 5.39 MILLION/MCL (ref 4.5–5.9)
RED CELL COUNT: 5.39 MIL/MCL (ref 4.5–5.9)
SODIUM SERPL-SCNC: 144 MMOL/L (ref 135–145)
SODIUM SERPL-SCNC: 144 MMOL/L (ref 135–145)
WBC # BLD: 9.6 THOUSAND/MCL (ref 4.2–11)
WHITE BLOOD COUNT: 9.6 K/MCL (ref 4.2–11)

## 2017-11-06 ENCOUNTER — CHARTING TRANS (OUTPATIENT)
Dept: OTHER | Age: 52
End: 2017-11-06

## 2017-11-07 ENCOUNTER — HOSPITAL (OUTPATIENT)
Dept: OTHER | Age: 52
End: 2017-11-07
Attending: NURSE PRACTITIONER

## 2017-12-01 ENCOUNTER — HOSPITAL (OUTPATIENT)
Dept: OTHER | Age: 52
End: 2017-12-01
Attending: NURSE PRACTITIONER

## 2017-12-05 ENCOUNTER — CHARTING TRANS (OUTPATIENT)
Dept: OTHER | Age: 52
End: 2017-12-05

## 2017-12-05 ENCOUNTER — IMAGING SERVICES (OUTPATIENT)
Dept: OTHER | Age: 52
End: 2017-12-05

## 2017-12-28 ENCOUNTER — CHARTING TRANS (OUTPATIENT)
Dept: OTHER | Age: 52
End: 2017-12-28

## 2018-01-01 ENCOUNTER — HOSPITAL (OUTPATIENT)
Dept: OTHER | Age: 53
End: 2018-01-01
Attending: NURSE PRACTITIONER

## 2018-01-23 ENCOUNTER — CHARTING TRANS (OUTPATIENT)
Dept: OTHER | Age: 53
End: 2018-01-23

## 2018-07-25 ENCOUNTER — CHARTING TRANS (OUTPATIENT)
Dept: OTHER | Age: 53
End: 2018-07-25

## 2018-07-25 ENCOUNTER — LAB SERVICES (OUTPATIENT)
Dept: OTHER | Age: 53
End: 2018-07-25

## 2018-07-26 LAB
APPEARANCE UR: CLEAR
APPEARANCE: CLEAR
BACTERIA #/AREA URNS HPF: ABNORMAL /HPF
BILIRUB UR QL: NEGATIVE
BILIRUBIN: NEGATIVE
COLOR UR: YELLOW
COLOR: YELLOW
GLUCOSE U: NEGATIVE
GLUCOSE UR-MCNC: NEGATIVE MG/DL
HYALINE CASTS #/AREA URNS LPF: ABNORMAL /LPF (ref 0–5)
KETONES UR-MCNC: NEGATIVE MG/DL
KETONES: NEGATIVE
LEUKOCYTES: NEGATIVE
MUCOUS THREADS URNS QL MICRO: PRESENT
NITRITE UR QL: NEGATIVE
NITRITE: NEGATIVE
OCCULT BLOOD: NEGATIVE
PH UR: 6 UNITS (ref 5–7)
PH: 7
PROT UR QL: NEGATIVE MG/DL
PROTEIN: NEGATIVE
RBC #/AREA URNS HPF: ABNORMAL /HPF (ref 0–3)
RBC-URINE: NEGATIVE
SP GR UR: 1.01 (ref 1–1.03)
SPECIMEN SOURCE: ABNORMAL
SQUAMOUS #/AREA URNS HPF: ABNORMAL /HPF (ref 0–5)
URINE SPEC GRAVITY: 1.01
UROBILINOGEN UR QL: 0.2 MG/DL (ref 0–1)
UROBILINOGEN: 0.2
WBC #/AREA URNS HPF: ABNORMAL /HPF (ref 0–5)
WBC-URINE: NEGATIVE

## 2018-07-30 ENCOUNTER — CHARTING TRANS (OUTPATIENT)
Dept: OTHER | Age: 53
End: 2018-07-30

## 2018-07-30 ENCOUNTER — HOSPITAL ENCOUNTER (EMERGENCY)
Facility: HOSPITAL | Age: 53
Discharge: HOME | End: 2018-07-31
Attending: EMERGENCY MEDICINE
Payer: COMMERCIAL

## 2018-07-30 DIAGNOSIS — T50.901A ACCIDENTAL DRUG INGESTION, INITIAL ENCOUNTER: Primary | ICD-10-CM

## 2018-07-30 LAB — BACTERIA UR CULT: NORMAL

## 2018-07-30 PROCEDURE — 99284 EMERGENCY DEPT VISIT MOD MDM: CPT

## 2018-07-30 PROCEDURE — 96361 HYDRATE IV INFUSION ADD-ON: CPT

## 2018-07-30 PROCEDURE — 80053 COMPREHEN METABOLIC PANEL: CPT | Performed by: EMERGENCY MEDICINE

## 2018-07-30 PROCEDURE — 85025 COMPLETE CBC W/AUTO DIFF WBC: CPT | Performed by: EMERGENCY MEDICINE

## 2018-07-30 PROCEDURE — 96360 HYDRATION IV INFUSION INIT: CPT

## 2018-07-30 RX ORDER — TAMSULOSIN HYDROCHLORIDE 0.4 MG/1
0.4 CAPSULE ORAL DAILY
COMMUNITY
End: 2019-08-02

## 2018-07-30 RX ORDER — SODIUM CHLORIDE 9 MG/ML
1000 INJECTION, SOLUTION INTRAVENOUS ONCE
Status: COMPLETED | OUTPATIENT
Start: 2018-07-30 | End: 2018-07-31

## 2018-07-31 VITALS
BODY MASS INDEX: 30 KG/M2 | TEMPERATURE: 98 F | SYSTOLIC BLOOD PRESSURE: 130 MMHG | HEART RATE: 80 BPM | WEIGHT: 188.06 LBS | DIASTOLIC BLOOD PRESSURE: 88 MMHG | RESPIRATION RATE: 17 BRPM | OXYGEN SATURATION: 97 %

## 2018-07-31 LAB
ALBUMIN SERPL-MCNC: 3.8 G/DL (ref 3.5–4.8)
ALBUMIN/GLOB SERPL: 1.2 {RATIO} (ref 1–2)
ALP LIVER SERPL-CCNC: 100 U/L (ref 45–117)
ALT SERPL-CCNC: 30 U/L (ref 17–63)
ANION GAP SERPL CALC-SCNC: 6 MMOL/L (ref 0–18)
AST SERPL-CCNC: 21 U/L (ref 15–41)
BASOPHILS # BLD AUTO: 0.06 X10(3) UL (ref 0–0.1)
BASOPHILS NFR BLD AUTO: 0.6 %
BILIRUB SERPL-MCNC: 1 MG/DL (ref 0.1–2)
BUN BLD-MCNC: 9 MG/DL (ref 8–20)
BUN/CREAT SERPL: 8.6 (ref 10–20)
CALCIUM BLD-MCNC: 8.5 MG/DL (ref 8.3–10.3)
CHLORIDE SERPL-SCNC: 105 MMOL/L (ref 101–111)
CO2 SERPL-SCNC: 29 MMOL/L (ref 22–32)
CREAT BLD-MCNC: 1.05 MG/DL (ref 0.7–1.3)
EOSINOPHIL # BLD AUTO: 0.32 X10(3) UL (ref 0–0.3)
EOSINOPHIL NFR BLD AUTO: 3.1 %
ERYTHROCYTE [DISTWIDTH] IN BLOOD BY AUTOMATED COUNT: 13.1 % (ref 11.5–16)
GLOBULIN PLAS-MCNC: 3.3 G/DL (ref 2.5–3.7)
GLUCOSE BLD-MCNC: 147 MG/DL (ref 70–99)
HCT VFR BLD AUTO: 42.6 % (ref 37–53)
HGB BLD-MCNC: 14.8 G/DL (ref 13–17)
IMMATURE GRANULOCYTE COUNT: 0.05 X10(3) UL (ref 0–1)
IMMATURE GRANULOCYTE RATIO %: 0.5 %
LYMPHOCYTES # BLD AUTO: 2.48 X10(3) UL (ref 0.9–4)
LYMPHOCYTES NFR BLD AUTO: 24.1 %
M PROTEIN MFR SERPL ELPH: 7.1 G/DL (ref 6.1–8.3)
MCH RBC QN AUTO: 29.5 PG (ref 27–33.2)
MCHC RBC AUTO-ENTMCNC: 34.7 G/DL (ref 31–37)
MCV RBC AUTO: 84.9 FL (ref 80–99)
MONOCYTES # BLD AUTO: 0.62 X10(3) UL (ref 0.1–1)
MONOCYTES NFR BLD AUTO: 6 %
NEUTROPHIL ABS PRELIM: 6.77 X10 (3) UL (ref 1.3–6.7)
NEUTROPHILS # BLD AUTO: 6.77 X10(3) UL (ref 1.3–6.7)
NEUTROPHILS NFR BLD AUTO: 65.7 %
OSMOLALITY SERPL CALC.SUM OF ELEC: 291 MOSM/KG (ref 275–295)
PLATELET # BLD AUTO: 168 10(3)UL (ref 150–450)
POTASSIUM SERPL-SCNC: 3.2 MMOL/L (ref 3.6–5.1)
RBC # BLD AUTO: 5.02 X10(6)UL (ref 4.3–5.7)
RED CELL DISTRIBUTION WIDTH-SD: 40.2 FL (ref 35.1–46.3)
SODIUM SERPL-SCNC: 140 MMOL/L (ref 136–144)
WBC # BLD AUTO: 10.3 X10(3) UL (ref 4–13)

## 2018-07-31 RX ORDER — POTASSIUM CHLORIDE 20 MEQ/1
20 TABLET, EXTENDED RELEASE ORAL ONCE
Status: COMPLETED | OUTPATIENT
Start: 2018-07-31 | End: 2018-07-31

## 2018-07-31 NOTE — ED NOTES
Pt reports \"feeling better\", and wife reports pt has been sleeping quietly for past hour. Vitals are stable. IVF have completed. Pt has not voided since arrival, reports no urge.

## 2018-07-31 NOTE — ED INITIAL ASSESSMENT (HPI)
Patient was brought in by EMS. Patient's daughter's friend had left out brownies, patient had eaten one not realizing they were \"special brownies\" according to medics. Patient arrives disoriented.  Denies headache or pain

## 2018-07-31 NOTE — ED PROVIDER NOTES
Patient Seen in: [facility] Emergency Department    History   Patient presents with:  Ingestion    Stated Complaint: Ingestion    HPI    Patient is a 25-year-old male brought in by his wife due to concern of possible marijuana ingestion.   He apparen 85.3 kg   SpO2 97%   BMI 30.35 kg/m²         Physical Exam  General: Well-developed, well-nourished, very pleasant adult male who is very fatigued, but arousable with just verbal command. He is able to say his name and just states that he is fatigued.   He PLATELET    Narrative: The following orders were created for panel order CBC WITH DIFFERENTIAL WITH PLATELET.   Procedure                               Abnormality         Status                     ---------                               -----------

## 2018-07-31 NOTE — ED NOTES
Pt drowsy and c/o \"dizziness\". Denies nausea. Pt inquiring about \"how long will this feeling last?\". Pt continues to be monitored.

## 2018-08-01 ENCOUNTER — CHARTING TRANS (OUTPATIENT)
Dept: OTHER | Age: 53
End: 2018-08-01

## 2018-09-22 ENCOUNTER — HOSPITAL ENCOUNTER (OUTPATIENT)
Facility: HOSPITAL | Age: 53
Setting detail: OBSERVATION
Discharge: HOME | End: 2018-09-23
Attending: EMERGENCY MEDICINE | Admitting: HOSPITALIST
Payer: COMMERCIAL

## 2018-09-22 ENCOUNTER — APPOINTMENT (OUTPATIENT)
Dept: GENERAL RADIOLOGY | Facility: HOSPITAL | Age: 53
End: 2018-09-22
Attending: EMERGENCY MEDICINE
Payer: COMMERCIAL

## 2018-09-22 DIAGNOSIS — M51.26 BULGE OF LUMBAR DISC WITHOUT MYELOPATHY: ICD-10-CM

## 2018-09-22 DIAGNOSIS — M54.59 INTRACTABLE LOW BACK PAIN: Primary | ICD-10-CM

## 2018-09-22 PROCEDURE — 73090 X-RAY EXAM OF FOREARM: CPT | Performed by: EMERGENCY MEDICINE

## 2018-09-22 PROCEDURE — 72100 X-RAY EXAM L-S SPINE 2/3 VWS: CPT | Performed by: EMERGENCY MEDICINE

## 2018-09-22 PROCEDURE — 99220 INITIAL OBSERVATION CARE,LEVL III: CPT | Performed by: HOSPITALIST

## 2018-09-22 RX ORDER — METOCLOPRAMIDE HYDROCHLORIDE 5 MG/ML
10 INJECTION INTRAMUSCULAR; INTRAVENOUS EVERY 8 HOURS PRN
Status: DISCONTINUED | OUTPATIENT
Start: 2018-09-22 | End: 2018-09-23

## 2018-09-22 RX ORDER — TRAZODONE HYDROCHLORIDE 50 MG/1
50 TABLET ORAL NIGHTLY PRN
Status: DISCONTINUED | OUTPATIENT
Start: 2018-09-22 | End: 2018-09-23

## 2018-09-22 RX ORDER — TIZANIDINE 4 MG/1
4 TABLET ORAL EVERY 6 HOURS PRN
Status: DISCONTINUED | OUTPATIENT
Start: 2018-09-22 | End: 2018-09-23

## 2018-09-22 RX ORDER — ALFUZOSIN HYDROCHLORIDE 10 MG/1
10 TABLET, EXTENDED RELEASE ORAL
Status: DISCONTINUED | OUTPATIENT
Start: 2018-09-23 | End: 2018-09-23

## 2018-09-22 RX ORDER — MORPHINE SULFATE 4 MG/ML
4 INJECTION, SOLUTION INTRAMUSCULAR; INTRAVENOUS EVERY 4 HOURS PRN
Status: DISCONTINUED | OUTPATIENT
Start: 2018-09-22 | End: 2018-09-23

## 2018-09-22 RX ORDER — ENOXAPARIN SODIUM 100 MG/ML
40 INJECTION SUBCUTANEOUS NIGHTLY
Status: DISCONTINUED | OUTPATIENT
Start: 2018-09-22 | End: 2018-09-23

## 2018-09-22 RX ORDER — METHYLPREDNISOLONE SODIUM SUCCINATE 125 MG/2ML
125 INJECTION, POWDER, LYOPHILIZED, FOR SOLUTION INTRAMUSCULAR; INTRAVENOUS ONCE
Status: COMPLETED | OUTPATIENT
Start: 2018-09-22 | End: 2018-09-22

## 2018-09-22 RX ORDER — ACETAMINOPHEN 325 MG/1
650 TABLET ORAL EVERY 6 HOURS PRN
Status: DISCONTINUED | OUTPATIENT
Start: 2018-09-22 | End: 2018-09-23

## 2018-09-22 RX ORDER — KETOROLAC TROMETHAMINE 30 MG/ML
30 INJECTION, SOLUTION INTRAMUSCULAR; INTRAVENOUS EVERY 6 HOURS PRN
Status: DISCONTINUED | OUTPATIENT
Start: 2018-09-22 | End: 2018-09-23

## 2018-09-22 RX ORDER — MORPHINE SULFATE 4 MG/ML
2 INJECTION, SOLUTION INTRAMUSCULAR; INTRAVENOUS EVERY 4 HOURS PRN
Status: DISCONTINUED | OUTPATIENT
Start: 2018-09-22 | End: 2018-09-23

## 2018-09-22 RX ORDER — ALBUTEROL SULFATE 90 UG/1
2 AEROSOL, METERED RESPIRATORY (INHALATION) EVERY 6 HOURS PRN
Status: DISCONTINUED | OUTPATIENT
Start: 2018-09-22 | End: 2018-09-23

## 2018-09-22 RX ORDER — METHYLPREDNISOLONE SODIUM SUCCINATE 125 MG/2ML
60 INJECTION, POWDER, LYOPHILIZED, FOR SOLUTION INTRAMUSCULAR; INTRAVENOUS EVERY 8 HOURS
Status: DISCONTINUED | OUTPATIENT
Start: 2018-09-23 | End: 2018-09-23

## 2018-09-22 RX ORDER — ALBUTEROL SULFATE 2.5 MG/3ML
2.5 SOLUTION RESPIRATORY (INHALATION) EVERY 6 HOURS PRN
Status: DISCONTINUED | OUTPATIENT
Start: 2018-09-22 | End: 2018-09-23

## 2018-09-22 RX ORDER — MORPHINE SULFATE 4 MG/ML
INJECTION, SOLUTION INTRAMUSCULAR; INTRAVENOUS EVERY 4 HOURS PRN
Status: DISCONTINUED | OUTPATIENT
Start: 2018-09-22 | End: 2018-09-22 | Stop reason: SDUPTHER

## 2018-09-22 RX ORDER — MORPHINE SULFATE 4 MG/ML
4 INJECTION, SOLUTION INTRAMUSCULAR; INTRAVENOUS EVERY 30 MIN PRN
Status: DISCONTINUED | OUTPATIENT
Start: 2018-09-22 | End: 2018-09-22

## 2018-09-22 RX ORDER — GABAPENTIN 600 MG/1
600 TABLET ORAL NIGHTLY
Status: DISCONTINUED | OUTPATIENT
Start: 2018-09-22 | End: 2018-09-23

## 2018-09-22 RX ORDER — ONDANSETRON 2 MG/ML
4 INJECTION INTRAMUSCULAR; INTRAVENOUS EVERY 6 HOURS PRN
Status: DISCONTINUED | OUTPATIENT
Start: 2018-09-22 | End: 2018-09-23

## 2018-09-22 RX ORDER — TRAMADOL HYDROCHLORIDE 50 MG/1
50 TABLET ORAL EVERY 6 HOURS PRN
Status: DISCONTINUED | OUTPATIENT
Start: 2018-09-22 | End: 2018-09-23

## 2018-09-22 RX ORDER — KETOROLAC TROMETHAMINE 30 MG/ML
30 INJECTION, SOLUTION INTRAMUSCULAR; INTRAVENOUS ONCE
Status: COMPLETED | OUTPATIENT
Start: 2018-09-22 | End: 2018-09-22

## 2018-09-22 RX ORDER — ROPINIROLE 0.25 MG/1
0.5 TABLET, FILM COATED ORAL
Status: DISCONTINUED | OUTPATIENT
Start: 2018-09-22 | End: 2018-09-23

## 2018-09-22 NOTE — ED PROVIDER NOTES
Patient Seen in: [facility] Emergency Department    History   Patient presents with:  Back Pain (musculoskeletal)  Fall (musculoskeletal, neurologic)    Stated Complaint: back pain    HPI    79-year-old male comes the hospital complaint having diffi src Temporal   SpO2 100 %   O2 Device None (Room air)       Current:/83   Pulse 72   Temp 97.8 °F (36.6 °C) (Temporal)   Resp 18   Ht 165.1 cm (5' 5\")   Wt 83 kg   SpO2 100%   BMI 30.45 kg/m²         Physical Exam    HEENT : NCAT, EOMI, PEERL, throa subluxation. L4-5 and L5-6 disc space narrowing. Scattered small marginal osteophytes. Normal curvatures. Right upper quadrant surgical clips. CONCLUSION:  Mild L4-5 and L5-6 degenerative disc disease.     Dictated by: Kayleigh Munguia MD on 9/22/2018

## 2018-09-22 NOTE — ED INITIAL ASSESSMENT (HPI)
Pt here stating he fell onto last side 1 hour ago at the office today c/o his legs gave out. Denies LOC stating he is unable to sit down.   Denies loss of bowel or bladder

## 2018-09-23 ENCOUNTER — APPOINTMENT (OUTPATIENT)
Dept: MRI IMAGING | Facility: HOSPITAL | Age: 53
End: 2018-09-23
Attending: HOSPITALIST
Payer: COMMERCIAL

## 2018-09-23 VITALS
OXYGEN SATURATION: 96 % | HEART RATE: 89 BPM | SYSTOLIC BLOOD PRESSURE: 112 MMHG | HEIGHT: 65 IN | TEMPERATURE: 98 F | RESPIRATION RATE: 15 BRPM | WEIGHT: 183 LBS | BODY MASS INDEX: 30.49 KG/M2 | DIASTOLIC BLOOD PRESSURE: 68 MMHG

## 2018-09-23 PROCEDURE — 99217 OBSERVATION CARE DISCHARGE: CPT | Performed by: HOSPITALIST

## 2018-09-23 PROCEDURE — 72158 MRI LUMBAR SPINE W/O & W/DYE: CPT | Performed by: HOSPITALIST

## 2018-09-23 RX ORDER — METHYLPREDNISOLONE 4 MG/1
TABLET ORAL
Qty: 21 TABLET | Refills: 0 | Status: SHIPPED | OUTPATIENT
Start: 2018-09-23 | End: 2019-07-31 | Stop reason: ALTCHOICE

## 2018-09-23 RX ORDER — HYDROCODONE BITARTRATE AND ACETAMINOPHEN 5; 325 MG/1; MG/1
1-2 TABLET ORAL EVERY 4 HOURS PRN
Qty: 20 TABLET | Refills: 0 | Status: SHIPPED | OUTPATIENT
Start: 2018-09-23 | End: 2019-08-02

## 2018-09-23 NOTE — PROGRESS NOTES
Admitted from ER,per stretcher,accompanied by wife,alert and oriented,not in distress or in a lot of pain. Assisted to bed,routine admission care rendered,oriented to room and use of call light. Plan of care discussed including diet,activity and pain medicat

## 2018-09-23 NOTE — RESPIRATORY THERAPY NOTE
SHAWN - Equipment Use Daily Summary:  · Set Mode   · Usage in hours:   · 90% Pressure (EPAP) level:   · 90% Insp Pressure (IPAP):   · AHI:   · Supplemental Oxygen:  · Comments: PATIENT STILL USING UNABLE TO RETREIVE DATA

## 2018-09-23 NOTE — PROGRESS NOTES
MRI results are back and patient does not need any urgent interventions. He will follow up as outpatient. Discharge instruction were discussed with the patient and his wife. All questions were answered. A script for Thony Hunt   was given.   Patient is being

## 2018-09-23 NOTE — H&P
[facility]PITAL[facility]  History and Physical     Alonzo Quevedo Patient Status:  Emergency    3/21/1965 MRN [id number]   Location [address] [facility] Attending Bret Elder MD   Hosp Day # 0 PCP Filemon Cagle MD     Chief Complaint: Take 3 mL (2.5 mg total) by nebulization every 6 (six) hours as needed for Wheezing. Disp: 50 ampule Rfl: 0   TraMADol HCl 50 MG Oral Tab Take 50 mg by mouth every 6 (six) hours as needed for Pain.  Disp:  Rfl:    rOPINIRole HCl (REQUIP) 0.5 MG Oral Tab  Maude Vasques Epic.      ASSESSMENT / PLAN:     1. Intractable low back pain/sciatica due to disc bulge; iv steroids/ prn toradol/morphine/zanaflex;  Please give patient outpatient referral upon discharge, as he has been dealing with this on and off for 4 years.     2. H

## 2018-09-23 NOTE — DISCHARGE SUMMARY
[facility] [place] HOSPITALIST  DISCHARGE SUMMARY     Alonzo Quevedo Patient Status:  Observation    3/21/1965 MRN [id number]   [facility] 3SW-A Attending Melody Dang MD   Hosp Day # 0 PCP David Yost MD     Date of Admission: 2018  Date of Tabs  Commonly known as:  NORCO      Take 1-2 tablets by mouth every 4 (four) hours as needed for Pain.    Quantity:  20 tablet  Refills:  0     methylPREDNISolone 4 MG Tbpk  Commonly known as:  MEDROL DOSEPACK      Take as directed on dose pack instruction (110-156)/(62-84) 110/70    Physical Exam:    General: No acute distress. Respiratory: Clear to auscultation bilaterally. No wheezes. No rhonchi. Cardiovascular: S1, S2. Regular rate and rhythm. No murmurs, rubs or gallops.    Abdomen: Soft, nontender, n

## 2018-09-23 NOTE — PROGRESS NOTES
[facility]  Progress Note     Wellington Aviles Patient Status:  Observation    3/21/1965 MRN [id number]   [facility] 3SW-A Attending Shanti Sparrow MD   Hosp Day # 0 PCP Parth Nam MD     Chief Complaint: back pain    S: Patient 1. Intractable low back pain/sciatica due to disc bulge; iv steroids/ prn toradol/morphine/zanaflex;  Please give patient outpatient referral upon discharge, as he has been dealing with this on and off for 4 years.     1. Considering recent saddle barry

## 2018-09-25 ENCOUNTER — CHARTING TRANS (OUTPATIENT)
Dept: OTHER | Age: 53
End: 2018-09-25

## 2018-10-16 ENCOUNTER — CHARTING TRANS (OUTPATIENT)
Dept: OTHER | Age: 53
End: 2018-10-16

## 2018-10-24 ENCOUNTER — LAB SERVICES (OUTPATIENT)
Dept: OTHER | Age: 53
End: 2018-10-24

## 2018-10-24 ENCOUNTER — CHARTING TRANS (OUTPATIENT)
Dept: OTHER | Age: 53
End: 2018-10-24

## 2018-10-24 ASSESSMENT — PAIN SCALES - GENERAL: PAINLEVEL_OUTOF10: 2

## 2018-10-25 ENCOUNTER — CHARTING TRANS (OUTPATIENT)
Dept: OTHER | Age: 53
End: 2018-10-25

## 2018-10-25 LAB
ALBUMIN SERPL-MCNC: 4.1 G/DL (ref 3.6–5.1)
ALBUMIN/GLOB SERPL: 1.3 (ref 1–2.4)
ALP SERPL-CCNC: 86 UNITS/L (ref 45–117)
ALT SERPL-CCNC: 33 UNITS/L
ANION GAP SERPL CALC-SCNC: 11 MMOL/L (ref 10–20)
APPEARANCE UR: CLEAR
AST SERPL-CCNC: 20 UNITS/L
BASOPHILS # BLD: 0.1 K/MCL (ref 0–0.3)
BASOPHILS NFR BLD: 1 %
BILIRUB SERPL-MCNC: 1.5 MG/DL (ref 0.2–1)
BILIRUB UR QL: NEGATIVE
BUN SERPL-MCNC: 14 MG/DL (ref 6–20)
BUN/CREAT SERPL: 15 (ref 7–25)
CALCIUM SERPL-MCNC: 9.3 MG/DL (ref 8.4–10.2)
CHLORIDE SERPL-SCNC: 106 MMOL/L (ref 98–107)
CHOLEST SERPL-MCNC: 201 MG/DL
CHOLEST/HDLC SERPL: 3
CO2 SERPL-SCNC: 31 MMOL/L (ref 21–32)
COLOR UR: YELLOW
CREAT SERPL-MCNC: 0.94 MG/DL (ref 0.67–1.17)
DIFFERENTIAL METHOD BLD: ABNORMAL
EOSINOPHIL # BLD: 0.2 K/MCL (ref 0.1–0.5)
EOSINOPHIL NFR BLD: 2 %
ERYTHROCYTE [DISTWIDTH] IN BLOOD: 13.5 % (ref 11–15)
GLOBULIN SER-MCNC: 3.2 G/DL (ref 2–4)
GLUCOSE SERPL-MCNC: 95 MG/DL (ref 65–99)
GLUCOSE UR-MCNC: NEGATIVE MG/DL
HBA1C MFR BLD: 5.6 % (ref 4.5–5.6)
HDLC SERPL-MCNC: 68 MG/DL
HEMATOCRIT: 47.9 % (ref 39–51)
HEMOGLOBIN: 15.5 G/DL (ref 13–17)
IMM GRANULOCYTES # BLD AUTO: 0.1 K/MCL (ref 0–0.2)
IMM GRANULOCYTES NFR BLD: 1 %
KETONES UR-MCNC: NEGATIVE MG/DL
LDLC SERPL CALC-MCNC: 121 MG/DL
LENGTH OF FAST TIME PATIENT: ABNORMAL HRS
LENGTH OF FAST TIME PATIENT: ABNORMAL HRS
LYMPHOCYTES # BLD: 1.8 K/MCL (ref 1–4)
LYMPHOCYTES NFR BLD: 15 %
MEAN CORPUSCULAR HEMOGLOBIN: 28.7 PG (ref 26–34)
MEAN CORPUSCULAR HGB CONC: 32.4 G/DL (ref 32–36.5)
MEAN CORPUSCULAR VOLUME: 88.5 FL (ref 78–100)
MONOCYTES # BLD: 0.7 K/MCL (ref 0.3–0.9)
MONOCYTES NFR BLD: 6 %
NEUTROPHILS # BLD: 9.3 K/MCL (ref 1.8–7.7)
NEUTROPHILS NFR BLD: 75 %
NITRITE UR QL: NEGATIVE
NONHDLC SERPL-MCNC: 133 MG/DL
NRBC (NRBCRE): 0 /100 WBC
PH UR: 6 UNITS (ref 5–7)
PLATELET COUNT: 181 K/MCL (ref 140–450)
POTASSIUM SERPL-SCNC: 4.3 MMOL/L (ref 3.4–5.1)
PROT UR QL: NEGATIVE MG/DL
PSA SERPL-MCNC: 1.14 NG/ML
RBC-URINE: NEGATIVE
RED CELL COUNT: 5.41 MIL/MCL (ref 4.5–5.9)
SODIUM SERPL-SCNC: 144 MMOL/L (ref 135–145)
SP GR UR: 1.02 (ref 1–1.03)
SPECIMEN SOURCE: NORMAL
TOTAL PROTEIN: 7.3 G/DL (ref 6.4–8.2)
TRIGL SERPL-MCNC: 62 MG/DL
TSH SERPL-ACNC: 0.76 MCUNITS/ML (ref 0.35–5)
UROBILINOGEN UR QL: 0.2 MG/DL (ref 0–1)
WBC-URINE: NEGATIVE
WHITE BLOOD COUNT: 12.1 K/MCL (ref 4.2–11)

## 2018-10-31 VITALS
WEIGHT: 184 LBS | TEMPERATURE: 98.5 F | SYSTOLIC BLOOD PRESSURE: 156 MMHG | DIASTOLIC BLOOD PRESSURE: 91 MMHG | HEART RATE: 77 BPM

## 2018-11-01 ENCOUNTER — CHARTING TRANS (OUTPATIENT)
Dept: OTHER | Age: 53
End: 2018-11-01

## 2018-11-02 VITALS — OXYGEN SATURATION: 98 % | WEIGHT: 189.99 LBS | BODY MASS INDEX: 30.53 KG/M2 | HEART RATE: 71 BPM | HEIGHT: 66 IN

## 2018-11-02 VITALS
DIASTOLIC BLOOD PRESSURE: 87 MMHG | HEIGHT: 66 IN | WEIGHT: 189 LBS | SYSTOLIC BLOOD PRESSURE: 147 MMHG | HEART RATE: 78 BPM | RESPIRATION RATE: 20 BRPM | OXYGEN SATURATION: 97 % | BODY MASS INDEX: 30.37 KG/M2

## 2018-11-02 VITALS
HEIGHT: 66 IN | WEIGHT: 183.43 LBS | DIASTOLIC BLOOD PRESSURE: 86 MMHG | BODY MASS INDEX: 29.48 KG/M2 | SYSTOLIC BLOOD PRESSURE: 132 MMHG

## 2018-11-02 VITALS
HEIGHT: 66 IN | HEART RATE: 85 BPM | WEIGHT: 184.99 LBS | DIASTOLIC BLOOD PRESSURE: 90 MMHG | OXYGEN SATURATION: 96 % | SYSTOLIC BLOOD PRESSURE: 142 MMHG | BODY MASS INDEX: 29.73 KG/M2

## 2018-11-02 VITALS
SYSTOLIC BLOOD PRESSURE: 157 MMHG | TEMPERATURE: 96.7 F | DIASTOLIC BLOOD PRESSURE: 94 MMHG | HEART RATE: 79 BPM | WEIGHT: 185.85 LBS

## 2018-11-03 VITALS
HEART RATE: 70 BPM | SYSTOLIC BLOOD PRESSURE: 124 MMHG | TEMPERATURE: 98.2 F | BODY MASS INDEX: 29.81 KG/M2 | WEIGHT: 185.5 LBS | HEIGHT: 66 IN | DIASTOLIC BLOOD PRESSURE: 70 MMHG

## 2018-11-16 ENCOUNTER — CHARTING TRANS (OUTPATIENT)
Dept: OTHER | Age: 53
End: 2018-11-16

## 2018-11-16 ENCOUNTER — HOSPITAL (OUTPATIENT)
Dept: OTHER | Age: 53
End: 2018-11-16

## 2018-11-27 VITALS
DIASTOLIC BLOOD PRESSURE: 94 MMHG | OXYGEN SATURATION: 95 % | WEIGHT: 182.98 LBS | HEIGHT: 66 IN | HEART RATE: 74 BPM | SYSTOLIC BLOOD PRESSURE: 150 MMHG | BODY MASS INDEX: 29.41 KG/M2

## 2018-11-27 VITALS
SYSTOLIC BLOOD PRESSURE: 156 MMHG | BODY MASS INDEX: 30.15 KG/M2 | DIASTOLIC BLOOD PRESSURE: 95 MMHG | TEMPERATURE: 97.2 F | WEIGHT: 184 LBS | HEART RATE: 83 BPM

## 2018-11-27 VITALS
HEIGHT: 66 IN | TEMPERATURE: 98.4 F | RESPIRATION RATE: 18 BRPM | HEART RATE: 80 BPM | WEIGHT: 180.13 LBS | DIASTOLIC BLOOD PRESSURE: 72 MMHG | SYSTOLIC BLOOD PRESSURE: 116 MMHG | BODY MASS INDEX: 28.95 KG/M2

## 2018-12-01 ENCOUNTER — PRIOR ORIGINAL RECORDS (OUTPATIENT)
Dept: HEALTH INFORMATION MANAGEMENT | Facility: OTHER | Age: 53
End: 2018-12-01

## 2018-12-06 ENCOUNTER — TELEPHONE (OUTPATIENT)
Dept: SCHEDULING | Age: 53
End: 2018-12-06

## 2018-12-27 ENCOUNTER — TELEPHONE (OUTPATIENT)
Dept: SCHEDULING | Age: 53
End: 2018-12-27

## 2018-12-27 RX ORDER — ALBUTEROL SULFATE 90 UG/1
2 AEROSOL, METERED RESPIRATORY (INHALATION) EVERY 4 HOURS PRN
Qty: 1 INHALER | Refills: 3 | Status: SHIPPED | OUTPATIENT
Start: 2018-12-27 | End: 2019-05-07 | Stop reason: SDUPTHER

## 2018-12-28 RX ORDER — TRAMADOL HYDROCHLORIDE 50 MG/1
50 TABLET ORAL EVERY 6 HOURS PRN
Qty: 90 TABLET | Refills: 0 | Status: SHIPPED | OUTPATIENT
Start: 2018-12-28 | End: 2019-05-02 | Stop reason: SDUPTHER

## 2018-12-29 ENCOUNTER — APPOINTMENT (OUTPATIENT)
Dept: GENERAL RADIOLOGY | Facility: HOSPITAL | Age: 53
End: 2018-12-29
Attending: EMERGENCY MEDICINE
Payer: COMMERCIAL

## 2018-12-29 ENCOUNTER — APPOINTMENT (OUTPATIENT)
Dept: CT IMAGING | Facility: HOSPITAL | Age: 53
End: 2018-12-29
Attending: EMERGENCY MEDICINE
Payer: COMMERCIAL

## 2018-12-29 ENCOUNTER — HOSPITAL ENCOUNTER (EMERGENCY)
Facility: HOSPITAL | Age: 53
Discharge: HOME | End: 2018-12-29
Attending: EMERGENCY MEDICINE
Payer: COMMERCIAL

## 2018-12-29 VITALS
OXYGEN SATURATION: 98 % | DIASTOLIC BLOOD PRESSURE: 66 MMHG | SYSTOLIC BLOOD PRESSURE: 110 MMHG | HEIGHT: 65 IN | RESPIRATION RATE: 16 BRPM | BODY MASS INDEX: 30.66 KG/M2 | TEMPERATURE: 100 F | HEART RATE: 95 BPM | WEIGHT: 184 LBS

## 2018-12-29 DIAGNOSIS — A08.4 VIRAL GASTROENTERITIS: ICD-10-CM

## 2018-12-29 DIAGNOSIS — B34.9 HEADACHE DUE TO VIRAL INFECTION: ICD-10-CM

## 2018-12-29 DIAGNOSIS — R51.9 HEADACHE DUE TO VIRAL INFECTION: ICD-10-CM

## 2018-12-29 DIAGNOSIS — B34.9 VIRAL SYNDROME: Primary | ICD-10-CM

## 2018-12-29 LAB
ALBUMIN SERPL-MCNC: 4 G/DL (ref 3.1–4.5)
ALBUMIN/GLOB SERPL: 1.1 {RATIO} (ref 1–2)
ALP LIVER SERPL-CCNC: 101 U/L (ref 45–117)
ALT SERPL-CCNC: 30 U/L (ref 17–63)
ANION GAP SERPL CALC-SCNC: 8 MMOL/L (ref 0–18)
AST SERPL-CCNC: 20 U/L (ref 15–41)
ATRIAL RATE: 108 BPM
BASOPHILS # BLD AUTO: 0.05 X10(3) UL (ref 0–0.1)
BASOPHILS NFR BLD AUTO: 0.3 %
BILIRUB SERPL-MCNC: 2.4 MG/DL (ref 0.1–2)
BILIRUB UR QL STRIP.AUTO: NEGATIVE
BUN BLD-MCNC: 7 MG/DL (ref 8–20)
BUN/CREAT SERPL: 7.4 (ref 10–20)
CALCIUM BLD-MCNC: 8.6 MG/DL (ref 8.3–10.3)
CHLORIDE SERPL-SCNC: 105 MMOL/L (ref 101–111)
CLARITY CSF: CLEAR
CLARITY CSF: CLEAR
CLARITY UR REFRACT.AUTO: CLEAR
CO2 SERPL-SCNC: 25 MMOL/L (ref 22–32)
COLOR CSF: COLORLESS
COLOR CSF: COLORLESS
COLOR UR AUTO: YELLOW
COUNT PERFORMED ON TUBE: 1
COUNT PERFORMED ON TUBE: 3
CREAT BLD-MCNC: 0.94 MG/DL (ref 0.7–1.3)
EOSINOPHIL # BLD AUTO: 0.03 X10(3) UL (ref 0–0.3)
EOSINOPHIL NFR BLD AUTO: 0.2 %
ERYTHROCYTE [DISTWIDTH] IN BLOOD BY AUTOMATED COUNT: 12.9 % (ref 11.5–16)
GLOBULIN PLAS-MCNC: 3.6 G/DL (ref 2.8–4.4)
GLUCOSE BLD-MCNC: 123 MG/DL (ref 70–99)
GLUCOSE CSF-MCNC: 70 MG/DL (ref 40–70)
GLUCOSE UR STRIP.AUTO-MCNC: NEGATIVE MG/DL
HCT VFR BLD AUTO: 43.2 % (ref 37–53)
HGB BLD-MCNC: 15.3 G/DL (ref 13–17)
IMMATURE GRANULOCYTE COUNT: 0.05 X10(3) UL (ref 0–1)
IMMATURE GRANULOCYTE RATIO %: 0.3 %
KETONES UR STRIP.AUTO-MCNC: NEGATIVE MG/DL
LACTIC ACID: 1.6 MMOL/L (ref 0.5–2)
LEUKOCYTE ESTERASE UR QL STRIP.AUTO: NEGATIVE
LYMPHOCYTES # BLD AUTO: 0.56 X10(3) UL (ref 0.9–4)
LYMPHOCYTES NFR BLD AUTO: 3.8 %
M PROTEIN MFR SERPL ELPH: 7.6 G/DL (ref 6.4–8.2)
MCH RBC QN AUTO: 29.7 PG (ref 27–33.2)
MCHC RBC AUTO-ENTMCNC: 35.4 G/DL (ref 31–37)
MCV RBC AUTO: 83.9 FL (ref 80–99)
MONOCYTES # BLD AUTO: 0.66 X10(3) UL (ref 0.1–1)
MONOCYTES NFR BLD AUTO: 4.5 %
NEUTROPHIL ABS PRELIM: 13.26 X10 (3) UL (ref 1.3–6.7)
NEUTROPHILS # BLD AUTO: 13.26 X10(3) UL (ref 1.3–6.7)
NEUTROPHILS NFR BLD AUTO: 90.9 %
NITRITE UR QL STRIP.AUTO: NEGATIVE
OSMOLALITY SERPL CALC.SUM OF ELEC: 285 MOSM/KG (ref 275–295)
P AXIS: 65 DEGREES
P-R INTERVAL: 146 MS
PH UR STRIP.AUTO: 8 [PH] (ref 4.5–8)
PLATELET # BLD AUTO: 140 10(3)UL (ref 150–450)
POTASSIUM SERPL-SCNC: 3.6 MMOL/L (ref 3.6–5.1)
PROT PATTERN CSF ELPH-IMP: 40.1 MG/DL (ref 15–45)
PROT UR STRIP.AUTO-MCNC: NEGATIVE MG/DL
Q-T INTERVAL: 326 MS
QRS DURATION: 92 MS
QTC CALCULATION (BEZET): 436 MS
R AXIS: 6 DEGREES
RBC # BLD AUTO: 5.15 X10(6)UL (ref 4.3–5.7)
RBC CSF TUBE 1: 8 /MM3
RBC CSF: 1 /MM3
RBC UR QL AUTO: NEGATIVE
RED CELL DISTRIBUTION WIDTH-SD: 39.4 FL (ref 35.1–46.3)
SODIUM SERPL-SCNC: 138 MMOL/L (ref 136–144)
SP GR UR STRIP.AUTO: 1.01 (ref 1–1.03)
T AXIS: 50 DEGREES
TOTAL VOLUME CSF: 9 ML
UROBILINOGEN UR STRIP.AUTO-MCNC: <2 MG/DL
VENTRICULAR RATE: 108 BPM
WBC # BLD AUTO: 14.6 X10(3) UL (ref 4–13)
WBC # FLD MANUAL: 2 /MM3 (ref 0–5)

## 2018-12-29 PROCEDURE — 99285 EMERGENCY DEPT VISIT HI MDM: CPT

## 2018-12-29 PROCEDURE — 84157 ASSAY OF PROTEIN OTHER: CPT | Performed by: EMERGENCY MEDICINE

## 2018-12-29 PROCEDURE — 87070 CULTURE OTHR SPECIMN AEROBIC: CPT | Performed by: EMERGENCY MEDICINE

## 2018-12-29 PROCEDURE — 81003 URINALYSIS AUTO W/O SCOPE: CPT | Performed by: EMERGENCY MEDICINE

## 2018-12-29 PROCEDURE — 80053 COMPREHEN METABOLIC PANEL: CPT | Performed by: EMERGENCY MEDICINE

## 2018-12-29 PROCEDURE — 89050 BODY FLUID CELL COUNT: CPT | Performed by: EMERGENCY MEDICINE

## 2018-12-29 PROCEDURE — 83605 ASSAY OF LACTIC ACID: CPT | Performed by: EMERGENCY MEDICINE

## 2018-12-29 PROCEDURE — 71045 X-RAY EXAM CHEST 1 VIEW: CPT | Performed by: EMERGENCY MEDICINE

## 2018-12-29 PROCEDURE — 62270 DX LMBR SPI PNXR: CPT | Performed by: EMERGENCY MEDICINE

## 2018-12-29 PROCEDURE — 96376 TX/PRO/DX INJ SAME DRUG ADON: CPT

## 2018-12-29 PROCEDURE — 93005 ELECTROCARDIOGRAM TRACING: CPT

## 2018-12-29 PROCEDURE — 77003 FLUOROGUIDE FOR SPINE INJECT: CPT | Performed by: EMERGENCY MEDICINE

## 2018-12-29 PROCEDURE — 82945 GLUCOSE OTHER FLUID: CPT | Performed by: EMERGENCY MEDICINE

## 2018-12-29 PROCEDURE — 96375 TX/PRO/DX INJ NEW DRUG ADDON: CPT

## 2018-12-29 PROCEDURE — 96365 THER/PROPH/DIAG IV INF INIT: CPT

## 2018-12-29 PROCEDURE — 85025 COMPLETE CBC W/AUTO DIFF WBC: CPT | Performed by: EMERGENCY MEDICINE

## 2018-12-29 PROCEDURE — 70450 CT HEAD/BRAIN W/O DYE: CPT | Performed by: EMERGENCY MEDICINE

## 2018-12-29 PROCEDURE — 96361 HYDRATE IV INFUSION ADD-ON: CPT

## 2018-12-29 PROCEDURE — 93010 ELECTROCARDIOGRAM REPORT: CPT

## 2018-12-29 PROCEDURE — 009U3ZX DRAINAGE OF SPINAL CANAL, PERCUTANEOUS APPROACH, DIAGNOSTIC: ICD-10-PCS | Performed by: EMERGENCY MEDICINE

## 2018-12-29 PROCEDURE — 87205 SMEAR GRAM STAIN: CPT | Performed by: EMERGENCY MEDICINE

## 2018-12-29 RX ORDER — HYDROCODONE BITARTRATE AND ACETAMINOPHEN 5; 325 MG/1; MG/1
1-2 TABLET ORAL EVERY 4 HOURS PRN
Qty: 10 TABLET | Refills: 0 | Status: SHIPPED | OUTPATIENT
Start: 2018-12-29 | End: 2019-01-05

## 2018-12-29 RX ORDER — ONDANSETRON 2 MG/ML
4 INJECTION INTRAMUSCULAR; INTRAVENOUS ONCE
Status: COMPLETED | OUTPATIENT
Start: 2018-12-29 | End: 2018-12-29

## 2018-12-29 RX ORDER — MORPHINE SULFATE 4 MG/ML
2 INJECTION, SOLUTION INTRAMUSCULAR; INTRAVENOUS EVERY 30 MIN PRN
Status: DISCONTINUED | OUTPATIENT
Start: 2018-12-29 | End: 2018-12-29

## 2018-12-29 RX ORDER — ONDANSETRON 4 MG/1
4 TABLET, ORALLY DISINTEGRATING ORAL EVERY 4 HOURS PRN
Qty: 10 TABLET | Refills: 0 | Status: SHIPPED | OUTPATIENT
Start: 2018-12-29 | End: 2019-01-05

## 2018-12-29 NOTE — ED PROVIDER NOTES
Patient Seen in: [facility] Emergency Department    History   Patient presents with:  Fever (infectious)  Nausea/Vomiting/Diarrhea (gastrointestinal)    Stated Complaint: fever, cough, vomiting    HPI    Patient presents with fever and cough over th 83.5 kg   SpO2 95%   BMI 30.62 kg/m²         Physical Exam   Constitutional: He is oriented to person, place, and time. He appears well-developed and well-nourished. Patient appears uncomfortable. HENT:   Head: Normocephalic.    Mouth/Throat: Oropharynx DIFFERENTIAL[[id number]]          Abnormal            Final result                 Please view results for these tests on the individual orders.    URINALYSIS WITH CULTURE REFLEX   CSF RBC TUBE 1   CELL COUNT, CSF    Narrative:      Fluid WBC count is less t into a lumbar interspinous space. Unfortunately, I was unable to access the subarachnoid space after multiple attempts. As a result, lumbar puncture under fluoroscopy was ordered. MDM   Patient presents with fever, cough, headache and vomiting.   Wor

## 2018-12-31 ENCOUNTER — DOCUMENTATION (OUTPATIENT)
Dept: INTERNAL MEDICINE | Age: 53
End: 2018-12-31

## 2018-12-31 PROBLEM — Z23 FLU VACCINE NEED: Status: ACTIVE | Noted: 2018-10-24

## 2018-12-31 PROBLEM — R51.9 HEADACHE: Status: ACTIVE | Noted: 2018-12-31

## 2018-12-31 PROBLEM — N40.0 BPH WITHOUT OBSTRUCTION/LOWER URINARY TRACT SYMPTOMS: Status: ACTIVE | Noted: 2017-12-05

## 2019-01-01 ENCOUNTER — EXTERNAL RECORD (OUTPATIENT)
Dept: HEALTH INFORMATION MANAGEMENT | Facility: OTHER | Age: 54
End: 2019-01-01

## 2019-01-04 ENCOUNTER — HOSPITAL (OUTPATIENT)
Dept: OTHER | Age: 54
End: 2019-01-04
Attending: NURSE PRACTITIONER

## 2019-01-07 RX ORDER — MODAFINIL 200 MG/1
200 TABLET ORAL DAILY
Qty: 90 TABLET | Refills: 1 | OUTPATIENT
Start: 2019-01-07 | End: 2019-06-08 | Stop reason: SDUPTHER

## 2019-01-24 ENCOUNTER — TELEPHONE (OUTPATIENT)
Dept: SCHEDULING | Age: 54
End: 2019-01-24

## 2019-01-24 RX ORDER — TAMSULOSIN HYDROCHLORIDE 0.4 MG/1
0.4 CAPSULE ORAL
COMMUNITY
End: 2019-01-24 | Stop reason: SDUPTHER

## 2019-01-24 RX ORDER — TAMSULOSIN HYDROCHLORIDE 0.4 MG/1
0.4 CAPSULE ORAL DAILY
Qty: 90 CAPSULE | Refills: 0 | Status: SHIPPED | OUTPATIENT
Start: 2019-01-24 | End: 2019-04-19 | Stop reason: SDUPTHER

## 2019-02-17 RX ORDER — GABAPENTIN 600 MG/1
TABLET ORAL
COMMUNITY
End: 2019-11-03 | Stop reason: SDUPTHER

## 2019-02-17 RX ORDER — ROPINIROLE 1 MG/1
TABLET, FILM COATED ORAL
COMMUNITY
End: 2021-03-20 | Stop reason: SDUPTHER

## 2019-02-17 RX ORDER — ALPRAZOLAM 0.5 MG/1
TABLET ORAL
COMMUNITY
End: 2020-07-06 | Stop reason: DRUGHIGH

## 2019-02-17 RX ORDER — CYCLOBENZAPRINE HCL 5 MG
TABLET ORAL
COMMUNITY
End: 2021-04-14 | Stop reason: SDUPTHER

## 2019-03-19 ENCOUNTER — PRIOR ORIGINAL RECORDS (OUTPATIENT)
Dept: OTHER | Age: 54
End: 2019-03-19

## 2019-04-15 ENCOUNTER — DOCUMENTATION (OUTPATIENT)
Dept: GERIATRIC MEDICINE | Age: 54
End: 2019-04-15

## 2019-04-15 PROBLEM — M54.32 SCIATICA OF LEFT SIDE: Status: ACTIVE | Noted: 2019-04-15

## 2019-04-15 PROBLEM — N20.0 KIDNEY STONES: Status: ACTIVE | Noted: 2019-04-15

## 2019-04-16 ENCOUNTER — DOCUMENTATION (OUTPATIENT)
Dept: GERIATRIC MEDICINE | Age: 54
End: 2019-04-16

## 2019-04-16 ENCOUNTER — MOBILE ENCOUNTER (OUTPATIENT)
Dept: SURGERY | Facility: CLINIC | Age: 54
End: 2019-04-16

## 2019-04-16 PROBLEM — D49.89 NEOPLASM OF ABDOMEN: Status: ACTIVE | Noted: 2019-04-16

## 2019-04-19 RX ORDER — TAMSULOSIN HYDROCHLORIDE 0.4 MG/1
CAPSULE ORAL
Qty: 90 CAPSULE | Refills: 0 | Status: SHIPPED | OUTPATIENT
Start: 2019-04-19 | End: 2019-08-01 | Stop reason: SDUPTHER

## 2019-04-28 ENCOUNTER — DOCUMENTATION (OUTPATIENT)
Dept: GERIATRIC MEDICINE | Age: 54
End: 2019-04-28

## 2019-04-28 PROBLEM — M51.36 BULGE OF LUMBAR DISC WITHOUT MYELOPATHY: Status: ACTIVE | Noted: 2019-04-28

## 2019-04-28 PROBLEM — C82.90 FOLLICULAR LYMPHOMA (CMD): Status: ACTIVE | Noted: 2019-04-28

## 2019-04-28 PROBLEM — M54.59 INTRACTABLE LOW BACK PAIN: Status: ACTIVE | Noted: 2018-09-22

## 2019-04-28 PROBLEM — D49.89 NEOPLASM OF ABDOMEN: Status: RESOLVED | Noted: 2019-04-16 | Resolved: 2019-04-28

## 2019-04-28 PROBLEM — C83.07: Status: ACTIVE | Noted: 2019-04-28

## 2019-04-28 PROBLEM — R53.83 FATIGUE: Status: ACTIVE | Noted: 2018-10-24

## 2019-04-28 PROBLEM — I88.0 MESENTERIC ADENITIS: Status: ACTIVE | Noted: 2017-04-21

## 2019-04-28 PROBLEM — N52.9 ERECTILE DYSFUNCTION: Status: ACTIVE | Noted: 2017-12-05

## 2019-04-28 PROBLEM — M51.369 BULGE OF LUMBAR DISC WITHOUT MYELOPATHY: Status: ACTIVE | Noted: 2019-04-28

## 2019-04-29 ENCOUNTER — PRIOR ORIGINAL RECORDS (OUTPATIENT)
Dept: OTHER | Age: 54
End: 2019-04-29

## 2019-04-29 LAB
ALBUMIN SERPL-MCNC: 4.2 G/DL (ref 3.6–5.1)
ALBUMIN/GLOB SERPL: 1.4 {RATIO} (ref 1–2.4)
ALP SERPL-CCNC: 112 UNITS/L (ref 45–117)
ALT SERPL-CCNC: 33 UNITS/L
ANION GAP SERPL CALC-SCNC: 9 MMOL/L (ref 10–20)
AST SERPL-CCNC: 21 UNITS/L
BILIRUB SERPL-MCNC: 0.9 MG/DL (ref 0.2–1)
BUN SERPL-MCNC: 10 MG/DL (ref 6–20)
BUN/CREAT SERPL: 12 (ref 7–25)
CALCIUM SERPL-MCNC: 9 MG/DL (ref 8.4–10.2)
CHLORIDE SERPL-SCNC: 107 MMOL/L (ref 98–107)
CO2 SERPL-SCNC: 29 MMOL/L (ref 21–32)
CREAT SERPL-MCNC: 0.85 MG/DL (ref 0.67–1.17)
CREATININE CLEARANCE (EST): 116.07 ML/MIN
GLOBULIN SER-MCNC: 2.9 G/DL (ref 2–4)
GLUCOSE SERPL-MCNC: 89 MG/DL (ref 65–99)
LDH SERPL L TO P-CCNC: 168 UNITS/L (ref 86–234)
LENGTH OF FAST TIME PATIENT: 0 HRS
POTASSIUM SERPL-SCNC: 3.8 MMOL/L (ref 3.4–5.1)
PROT SERPL-MCNC: 7.1 G/DL (ref 6.4–8.2)
SODIUM SERPL-SCNC: 141 MMOL/L (ref 135–145)

## 2019-04-29 ASSESSMENT — PAIN SCALES - GENERAL: PAINLEVEL_OUTOF10: 6

## 2019-04-30 LAB — B2 MICROGLOB SERPL-MCNC: 1.6 MG/L (ref 1.2–3.5)

## 2019-05-01 ENCOUNTER — DOCUMENTATION (OUTPATIENT)
Dept: GERIATRIC MEDICINE | Age: 54
End: 2019-05-01

## 2019-05-02 ENCOUNTER — DOCUMENTATION (OUTPATIENT)
Dept: GERIATRIC MEDICINE | Age: 54
End: 2019-05-02

## 2019-05-02 DIAGNOSIS — M51.36 BULGE OF LUMBAR DISC WITHOUT MYELOPATHY: Primary | ICD-10-CM

## 2019-05-02 RX ORDER — TRAMADOL HYDROCHLORIDE 50 MG/1
50 TABLET ORAL EVERY 6 HOURS PRN
Qty: 90 TABLET | Refills: 0 | Status: SHIPPED | OUTPATIENT
Start: 2019-05-02 | End: 2019-06-28 | Stop reason: SDUPTHER

## 2019-05-06 ENCOUNTER — DOCUMENTATION (OUTPATIENT)
Dept: GERIATRIC MEDICINE | Age: 54
End: 2019-05-06

## 2019-05-07 ENCOUNTER — OFFICE VISIT (OUTPATIENT)
Dept: INTERNAL MEDICINE | Age: 54
End: 2019-05-07

## 2019-05-07 DIAGNOSIS — Z23 FLU VACCINE NEED: ICD-10-CM

## 2019-05-07 DIAGNOSIS — N20.0 NEPHROLITHIASIS: ICD-10-CM

## 2019-05-07 DIAGNOSIS — N40.0 BPH WITHOUT OBSTRUCTION/LOWER URINARY TRACT SYMPTOMS: ICD-10-CM

## 2019-05-07 DIAGNOSIS — C85.93 LYMPHOMA OF INTRA-ABDOMINAL LYMPH NODES, UNSPECIFIED LYMPHOMA TYPE (CMD): Primary | ICD-10-CM

## 2019-05-07 DIAGNOSIS — M48.061 SPINAL STENOSIS OF LUMBAR REGION, UNSPECIFIED WHETHER NEUROGENIC CLAUDICATION PRESENT: ICD-10-CM

## 2019-05-07 DIAGNOSIS — J45.20 MILD INTERMITTENT ASTHMA WITHOUT COMPLICATION: ICD-10-CM

## 2019-05-07 PROBLEM — C85.90 LYMPHOMA  (CMD): Status: ACTIVE | Noted: 2019-05-07

## 2019-05-07 PROCEDURE — 99213 OFFICE O/P EST LOW 20 MIN: CPT | Performed by: INTERNAL MEDICINE

## 2019-05-07 RX ORDER — ALBUTEROL SULFATE 90 UG/1
2 AEROSOL, METERED RESPIRATORY (INHALATION) EVERY 4 HOURS PRN
Qty: 1 INHALER | Refills: 3 | Status: SHIPPED | OUTPATIENT
Start: 2019-05-07 | End: 2020-03-23 | Stop reason: SDUPTHER

## 2019-05-07 RX ORDER — FLUTICASONE PROPIONATE AND SALMETEROL 500; 50 UG/1; UG/1
1 POWDER RESPIRATORY (INHALATION) EVERY 12 HOURS SCHEDULED
COMMUNITY
End: 2021-03-22 | Stop reason: ALTCHOICE

## 2019-05-07 RX ORDER — MONTELUKAST SODIUM 10 MG/1
10 TABLET ORAL
COMMUNITY
End: 2021-02-16 | Stop reason: ALTCHOICE

## 2019-05-07 ASSESSMENT — ENCOUNTER SYMPTOMS
SINUS PRESSURE: 0
FACIAL SWELLING: 0
SEIZURES: 0
VOMITING: 0
SHORTNESS OF BREATH: 0
SLEEP DISTURBANCE: 0
SORE THROAT: 0
ABDOMINAL PAIN: 0
FATIGUE: 0
PHOTOPHOBIA: 0
HEADACHES: 0
DIARRHEA: 0
ADENOPATHY: 0
EYE DISCHARGE: 0
TROUBLE SWALLOWING: 0
CHEST TIGHTNESS: 0
BRUISES/BLEEDS EASILY: 0
FEVER: 0
EYE PAIN: 0
CONSTIPATION: 0
CONFUSION: 0
TREMORS: 0
RECTAL PAIN: 0
NUMBNESS: 0

## 2019-05-07 ASSESSMENT — PATIENT HEALTH QUESTIONNAIRE - PHQ9
2. FEELING DOWN, DEPRESSED OR HOPELESS: NOT AT ALL
1. LITTLE INTEREST OR PLEASURE IN DOING THINGS: NOT AT ALL
SUM OF ALL RESPONSES TO PHQ9 QUESTIONS 1 AND 2: 0
SUM OF ALL RESPONSES TO PHQ9 QUESTIONS 1 AND 2: 0

## 2019-05-07 ASSESSMENT — PAIN SCALES - GENERAL: PAINLEVEL: 5-6

## 2019-05-08 ENCOUNTER — PRIOR ORIGINAL RECORDS (OUTPATIENT)
Dept: OTHER | Age: 54
End: 2019-05-08

## 2019-05-08 LAB
BASO+EOS+MONOS # BLD: 0.2 K/MCL (ref 0.1–1.1)
BASO+EOS+MONOS NFR BLD: 2 %
DIFFERENTIAL METHOD BLD: NORMAL
ERYTHROCYTE [DISTWIDTH] IN BLOOD: 14 % (ref 11–15)
GLUCOSE BLDC GLUCOMTR-MCNC: 86 MG/DL (ref 65–99)
GLUCOSE BLDC GLUCOMTR-MCNC: 86 MG/DL (ref 65–99)
HCT VFR BLD CALC: 44 % (ref 39–51)
HGB BLD-MCNC: 15.1 G/DL (ref 13–17)
LYMPHOCYTES # BLD: 1.8 K/MCL (ref 1–4)
LYMPHOCYTES NFR BLD: 21 %
MCH RBC QN AUTO: 29.1 PG (ref 26–34)
MCHC RBC AUTO-ENTMCNC: 34.3 G/DL (ref 32–36.5)
MCV RBC AUTO: 84.8 FL (ref 78–100)
NEUTROPHILS # BLD: 6.6 K/MCL (ref 1.8–7.7)
NEUTROPHILS NFR BLD: 77 %
PLATELET # BLD: 152 K/MCL (ref 140–450)
RBC # BLD: 5.19 MIL/MCL (ref 4.5–5.9)
WBC # BLD: 8.6 K/MCL (ref 4.2–11)

## 2019-05-08 ASSESSMENT — PAIN SCALES - GENERAL: PAINLEVEL_OUTOF10: 0

## 2019-05-09 ENCOUNTER — DOCUMENTATION (OUTPATIENT)
Dept: GERIATRIC MEDICINE | Age: 54
End: 2019-05-09

## 2019-05-13 LAB — SURGICAL PATHOLOGY: NORMAL

## 2019-05-15 ENCOUNTER — TELEPHONE (OUTPATIENT)
Dept: SCHEDULING | Age: 54
End: 2019-05-15

## 2019-05-16 ENCOUNTER — OFFICE VISIT (OUTPATIENT)
Dept: RHEUMATOLOGY | Age: 54
End: 2019-05-16

## 2019-05-16 VITALS
DIASTOLIC BLOOD PRESSURE: 76 MMHG | WEIGHT: 183 LBS | HEART RATE: 74 BPM | TEMPERATURE: 97 F | BODY MASS INDEX: 29.09 KG/M2 | SYSTOLIC BLOOD PRESSURE: 142 MMHG

## 2019-05-16 DIAGNOSIS — M79.7 FIBROMYALGIA: Primary | ICD-10-CM

## 2019-05-16 PROCEDURE — 99213 OFFICE O/P EST LOW 20 MIN: CPT | Performed by: INTERNAL MEDICINE

## 2019-05-16 ASSESSMENT — ENCOUNTER SYMPTOMS
FATIGUE: 0
DIAPHORESIS: 0
ABDOMINAL PAIN: 0
WEAKNESS: 0
COUGH: 0
BACK PAIN: 1
SHORTNESS OF BREATH: 0
FEVER: 0
UNEXPECTED WEIGHT CHANGE: 0
SLEEP DISTURBANCE: 1

## 2019-05-19 ENCOUNTER — DOCUMENTATION (OUTPATIENT)
Dept: GERIATRIC MEDICINE | Age: 54
End: 2019-05-19

## 2019-05-23 ENCOUNTER — PRIOR ORIGINAL RECORDS (OUTPATIENT)
Dept: OTHER | Age: 54
End: 2019-05-23

## 2019-05-23 ENCOUNTER — TELEPHONE (OUTPATIENT)
Dept: GERIATRIC MEDICINE | Age: 54
End: 2019-05-23

## 2019-05-23 ASSESSMENT — PAIN SCALES - GENERAL: PAINLEVEL_OUTOF10: 5

## 2019-05-31 ENCOUNTER — PRIOR ORIGINAL RECORDS (OUTPATIENT)
Dept: OTHER | Age: 54
End: 2019-05-31

## 2019-05-31 LAB
ALBUMIN SERPL-MCNC: 4.4 G/DL (ref 3.6–5.1)
ALBUMIN/GLOB SERPL: 1.5 {RATIO} (ref 1–2.4)
ALP SERPL-CCNC: 107 UNITS/L (ref 45–117)
ALT SERPL-CCNC: 28 UNITS/L
ANION GAP SERPL CALC-SCNC: 11 MMOL/L (ref 10–20)
AST SERPL-CCNC: 19 UNITS/L
BASOPHILS # BLD: 0.1 K/MCL (ref 0–0.3)
BASOPHILS NFR BLD: 1 % (ref 0–2)
BILIRUB SERPL-MCNC: 1 MG/DL (ref 0.2–1)
BUN SERPL-MCNC: 10 MG/DL (ref 6–20)
BUN/CREAT SERPL: 11 (ref 7–25)
CALCIUM SERPL-MCNC: 9.5 MG/DL (ref 8.4–10.2)
CHLORIDE SERPL-SCNC: 107 MMOL/L (ref 98–107)
CO2 SERPL-SCNC: 29 MMOL/L (ref 21–32)
CREAT SERPL-MCNC: 0.91 MG/DL (ref 0.67–1.17)
CREATININE CLEARANCE (EST): 109.86 ML/MIN
DIFFERENTIAL METHOD BLD: NORMAL
EOSINOPHIL # BLD: 0.2 K/MCL (ref 0.1–0.5)
EOSINOPHIL NFR BLD: 2 % (ref 0–6)
ERYTHROCYTE [DISTWIDTH] IN BLOOD: 13.2 % (ref 11–15)
GLOBULIN SER-MCNC: 2.9 G/DL (ref 2–4)
GLUCOSE SERPL-MCNC: 83 MG/DL (ref 65–99)
HBV SURFACE AG SER QL: NEGATIVE
HCT VFR BLD CALC: 45.5 % (ref 39–51)
HGB BLD-MCNC: 15.4 G/DL (ref 13–17)
IMM GRANULOCYTES # BLD AUTO: 0 K/MCL (ref 0–0.2)
IMM GRANULOCYTES NFR BLD: 0 %
LENGTH OF FAST TIME PATIENT: 12 HRS
LYMPHOCYTES # BLD: 1.9 K/MCL (ref 1–4)
LYMPHOCYTES NFR BLD: 24 %
MCH RBC QN AUTO: 28.9 PG (ref 26–34)
MCHC RBC AUTO-ENTMCNC: 33.8 G/DL (ref 32–36.5)
MCV RBC AUTO: 85.4 FL (ref 78–100)
MONOCYTES # BLD: 0.5 K/MCL (ref 0.3–0.9)
MONOCYTES NFR BLD: 7 %
NEUTROPHILS # BLD: 5.4 K/MCL (ref 1.8–7.7)
NEUTROPHILS NFR BLD: 66 %
NRBC BLD MANUAL-RTO: 0 /100 WBC (ref 0–0)
PLATELET # BLD: 167 K/MCL (ref 140–450)
POTASSIUM SERPL-SCNC: 4.1 MMOL/L (ref 3.4–5.1)
PROT SERPL-MCNC: 7.3 G/DL (ref 6.4–8.2)
RBC # BLD: 5.33 MIL/MCL (ref 4.5–5.9)
SODIUM SERPL-SCNC: 143 MMOL/L (ref 135–145)
WBC # BLD: 8.1 K/MCL (ref 4.2–11)

## 2019-06-05 ENCOUNTER — PRIOR ORIGINAL RECORDS (OUTPATIENT)
Dept: OTHER | Age: 54
End: 2019-06-05

## 2019-06-05 ASSESSMENT — PAIN SCALES - GENERAL: PAINLEVEL_OUTOF10: 5

## 2019-06-07 ENCOUNTER — DOCUMENTATION (OUTPATIENT)
Dept: GERIATRIC MEDICINE | Age: 54
End: 2019-06-07

## 2019-06-12 ENCOUNTER — PRIOR ORIGINAL RECORDS (OUTPATIENT)
Dept: OTHER | Age: 54
End: 2019-06-12

## 2019-06-12 LAB
BASO+EOS+MONOS # BLD: 0.5 K/MCL (ref 0.1–1.1)
BASO+EOS+MONOS NFR BLD: 6 %
DIFFERENTIAL METHOD BLD: NORMAL
ERYTHROCYTE [DISTWIDTH] IN BLOOD: 14.2 % (ref 11–15)
HCT VFR BLD CALC: 44.8 % (ref 39–51)
HGB BLD-MCNC: 15.5 G/DL (ref 13–17)
LYMPHOCYTES # BLD: 1.4 K/MCL (ref 1–4)
LYMPHOCYTES NFR BLD: 15 %
MCH RBC QN AUTO: 29.5 PG (ref 26–34)
MCHC RBC AUTO-ENTMCNC: 34.6 G/DL (ref 32–36.5)
MCV RBC AUTO: 85.3 FL (ref 78–100)
NEUTROPHILS # BLD: 7.1 K/MCL (ref 1.8–7.7)
NEUTROPHILS NFR BLD: 79 %
PLATELET # BLD: 172 K/MCL (ref 140–450)
RBC # BLD: 5.25 MIL/MCL (ref 4.5–5.9)
WBC # BLD: 9 K/MCL (ref 4.2–11)

## 2019-06-12 RX ORDER — MODAFINIL 200 MG/1
TABLET ORAL
Qty: 90 TABLET | Refills: 1 | Status: SHIPPED | OUTPATIENT
Start: 2019-06-12 | End: 2019-06-28 | Stop reason: SDUPTHER

## 2019-06-12 ASSESSMENT — PAIN SCALES - GENERAL: PAINLEVEL_OUTOF10: 0

## 2019-06-19 ENCOUNTER — PRIOR ORIGINAL RECORDS (OUTPATIENT)
Dept: OTHER | Age: 54
End: 2019-06-19

## 2019-06-19 LAB
BASO+EOS+MONOS # BLD: 0.6 K/MCL (ref 0.1–1.1)
BASO+EOS+MONOS NFR BLD: 7 %
DIFFERENTIAL METHOD BLD: NORMAL
ERYTHROCYTE [DISTWIDTH] IN BLOOD: 14.7 % (ref 11–15)
HCT VFR BLD CALC: 45.3 % (ref 39–51)
HGB BLD-MCNC: 15.3 G/DL (ref 13–17)
LYMPHOCYTES # BLD: 1.4 K/MCL (ref 1–4)
LYMPHOCYTES NFR BLD: 18 %
MCH RBC QN AUTO: 28.9 PG (ref 26–34)
MCHC RBC AUTO-ENTMCNC: 33.8 G/DL (ref 32–36.5)
MCV RBC AUTO: 85.5 FL (ref 78–100)
NEUTROPHILS # BLD: 6 K/MCL (ref 1.8–7.7)
NEUTROPHILS NFR BLD: 75 %
PLATELET # BLD: 176 K/MCL (ref 140–450)
RBC # BLD: 5.3 MIL/MCL (ref 4.5–5.9)
WBC # BLD: 8 K/MCL (ref 4.2–11)

## 2019-06-19 ASSESSMENT — PAIN SCALES - GENERAL: PAINLEVEL_OUTOF10: 7

## 2019-06-26 ENCOUNTER — PRIOR ORIGINAL RECORDS (OUTPATIENT)
Dept: OTHER | Age: 54
End: 2019-06-26

## 2019-06-26 LAB
BASO+EOS+MONOS # BLD: 0.6 K/MCL (ref 0.1–1.1)
BASO+EOS+MONOS NFR BLD: 7 %
DIFFERENTIAL METHOD BLD: NORMAL
ERYTHROCYTE [DISTWIDTH] IN BLOOD: 14.5 % (ref 11–15)
HCT VFR BLD CALC: 44 % (ref 39–51)
HGB BLD-MCNC: 15.2 G/DL (ref 13–17)
LYMPHOCYTES # BLD: 1.4 K/MCL (ref 1–4)
LYMPHOCYTES NFR BLD: 19 %
MCH RBC QN AUTO: 29.7 PG (ref 26–34)
MCHC RBC AUTO-ENTMCNC: 34.5 G/DL (ref 32–36.5)
MCV RBC AUTO: 85.9 FL (ref 78–100)
NEUTROPHILS # BLD: 5.7 K/MCL (ref 1.8–7.7)
NEUTROPHILS NFR BLD: 74 %
PLATELET # BLD: 170 K/MCL (ref 140–450)
RBC # BLD: 5.12 MIL/MCL (ref 4.5–5.9)
WBC # BLD: 7.7 K/MCL (ref 4.2–11)

## 2019-06-26 ASSESSMENT — PAIN SCALES - GENERAL: PAINLEVEL_OUTOF10: 8

## 2019-06-28 DIAGNOSIS — M51.36 BULGE OF LUMBAR DISC WITHOUT MYELOPATHY: ICD-10-CM

## 2019-06-30 RX ORDER — MODAFINIL 200 MG/1
TABLET ORAL
Qty: 90 TABLET | Refills: 1 | Status: SHIPPED | OUTPATIENT
Start: 2019-06-30 | End: 2019-08-27 | Stop reason: SDUPTHER

## 2019-06-30 RX ORDER — TRAMADOL HYDROCHLORIDE 50 MG/1
50 TABLET ORAL EVERY 6 HOURS PRN
Qty: 90 TABLET | Refills: 0 | Status: SHIPPED | OUTPATIENT
Start: 2019-06-30 | End: 2020-01-07 | Stop reason: SDUPTHER

## 2019-07-01 ENCOUNTER — TELEPHONE (OUTPATIENT)
Dept: SCHEDULING | Age: 54
End: 2019-07-01

## 2019-07-02 ENCOUNTER — TELEPHONE (OUTPATIENT)
Dept: RHEUMATOLOGY | Age: 54
End: 2019-07-02

## 2019-07-03 ENCOUNTER — PRIOR ORIGINAL RECORDS (OUTPATIENT)
Dept: OTHER | Age: 54
End: 2019-07-03

## 2019-07-03 LAB
ALBUMIN SERPL-MCNC: 4.3 G/DL (ref 3.6–5.1)
ALBUMIN/GLOB SERPL: 1.5 {RATIO} (ref 1–2.4)
ALP SERPL-CCNC: 113 UNITS/L (ref 45–117)
ALT SERPL-CCNC: 34 UNITS/L
ANION GAP SERPL CALC-SCNC: 10 MMOL/L (ref 10–20)
AST SERPL-CCNC: 21 UNITS/L
BASO+EOS+MONOS # BLD: 0.6 K/MCL (ref 0.1–1.1)
BASO+EOS+MONOS NFR BLD: 9 %
BILIRUB SERPL-MCNC: 0.9 MG/DL (ref 0.2–1)
BUN SERPL-MCNC: 8 MG/DL (ref 6–20)
BUN/CREAT SERPL: 10 (ref 7–25)
CALCIUM SERPL-MCNC: 9.6 MG/DL (ref 8.4–10.2)
CHLORIDE SERPL-SCNC: 108 MMOL/L (ref 98–107)
CO2 SERPL-SCNC: 29 MMOL/L (ref 21–32)
CREAT SERPL-MCNC: 0.81 MG/DL (ref 0.67–1.17)
CREATININE CLEARANCE (EST): 123.43 ML/MIN
DIFFERENTIAL METHOD BLD: NORMAL
ERYTHROCYTE [DISTWIDTH] IN BLOOD: 15 % (ref 11–15)
GLOBULIN SER-MCNC: 2.9 G/DL (ref 2–4)
GLUCOSE SERPL-MCNC: 91 MG/DL (ref 65–99)
HCT VFR BLD CALC: 43.7 % (ref 39–51)
HGB BLD-MCNC: 14.8 G/DL (ref 13–17)
LENGTH OF FAST TIME PATIENT: 0 HRS
LYMPHOCYTES # BLD: 1.5 K/MCL (ref 1–4)
LYMPHOCYTES NFR BLD: 23 %
MCH RBC QN AUTO: 29.3 PG (ref 26–34)
MCHC RBC AUTO-ENTMCNC: 33.9 G/DL (ref 32–36.5)
MCV RBC AUTO: 86.5 FL (ref 78–100)
NEUTROPHILS # BLD: 4.7 K/MCL (ref 1.8–7.7)
NEUTROPHILS NFR BLD: 68 %
PLATELET # BLD: 147 K/MCL (ref 140–450)
POTASSIUM SERPL-SCNC: 4 MMOL/L (ref 3.4–5.1)
PROT SERPL-MCNC: 7.2 G/DL (ref 6.4–8.2)
RBC # BLD: 5.05 MIL/MCL (ref 4.5–5.9)
SODIUM SERPL-SCNC: 143 MMOL/L (ref 135–145)
WBC # BLD: 6.8 K/MCL (ref 4.2–11)

## 2019-07-03 ASSESSMENT — PAIN SCALES - GENERAL: PAINLEVEL_OUTOF10: 5

## 2019-07-10 ENCOUNTER — PRIOR ORIGINAL RECORDS (OUTPATIENT)
Dept: OTHER | Age: 54
End: 2019-07-10

## 2019-07-10 LAB
BASO+EOS+MONOS # BLD: 0.6 K/MCL (ref 0.1–1.1)
BASO+EOS+MONOS NFR BLD: 8 %
DIFFERENTIAL METHOD BLD: NORMAL
ERYTHROCYTE [DISTWIDTH] IN BLOOD: 14.4 % (ref 11–15)
HCT VFR BLD CALC: 43.2 % (ref 39–51)
HGB BLD-MCNC: 15.3 G/DL (ref 13–17)
LYMPHOCYTES # BLD: 1.5 K/MCL (ref 1–4)
LYMPHOCYTES NFR BLD: 19 %
MCH RBC QN AUTO: 30.1 PG (ref 26–34)
MCHC RBC AUTO-ENTMCNC: 35.4 G/DL (ref 32–36.5)
MCV RBC AUTO: 84.9 FL (ref 78–100)
NEUTROPHILS # BLD: 5.7 K/MCL (ref 1.8–7.7)
NEUTROPHILS NFR BLD: 73 %
PLATELET # BLD: 165 K/MCL (ref 140–450)
RBC # BLD: 5.09 MIL/MCL (ref 4.5–5.9)
WBC # BLD: 7.8 K/MCL (ref 4.2–11)

## 2019-07-10 ASSESSMENT — PAIN SCALES - GENERAL: PAINLEVEL_OUTOF10: 5

## 2019-07-17 ENCOUNTER — PRIOR ORIGINAL RECORDS (OUTPATIENT)
Dept: OTHER | Age: 54
End: 2019-07-17

## 2019-07-17 LAB
BASO+EOS+MONOS # BLD: 0.6 K/MCL (ref 0.1–1.1)
BASO+EOS+MONOS NFR BLD: 8 %
DIFFERENTIAL METHOD BLD: NORMAL
ERYTHROCYTE [DISTWIDTH] IN BLOOD: 14.4 % (ref 11–15)
HCT VFR BLD CALC: 43.8 % (ref 39–51)
HGB BLD-MCNC: 15.4 G/DL (ref 13–17)
LYMPHOCYTES # BLD: 1.3 K/MCL (ref 1–4)
LYMPHOCYTES NFR BLD: 18 %
MCH RBC QN AUTO: 30.1 PG (ref 26–34)
MCHC RBC AUTO-ENTMCNC: 35.2 G/DL (ref 32–36.5)
MCV RBC AUTO: 85.7 FL (ref 78–100)
NEUTROPHILS # BLD: 5.1 K/MCL (ref 1.8–7.7)
NEUTROPHILS NFR BLD: 74 %
PLATELET # BLD: 175 K/MCL (ref 140–450)
RBC # BLD: 5.11 MIL/MCL (ref 4.5–5.9)
WBC # BLD: 7 K/MCL (ref 4.2–11)

## 2019-07-17 ASSESSMENT — PAIN SCALES - GENERAL: PAINLEVEL_OUTOF10: 4

## 2019-07-24 ENCOUNTER — PRIOR ORIGINAL RECORDS (OUTPATIENT)
Dept: OTHER | Age: 54
End: 2019-07-24

## 2019-07-24 LAB
BASO+EOS+MONOS # BLD: 0.5 K/MCL (ref 0.1–1.1)
BASO+EOS+MONOS NFR BLD: 7 %
DIFFERENTIAL METHOD BLD: NORMAL
ERYTHROCYTE [DISTWIDTH] IN BLOOD: 14.2 % (ref 11–15)
HCT VFR BLD CALC: 44.6 % (ref 39–51)
HGB BLD-MCNC: 15.4 G/DL (ref 13–17)
LYMPHOCYTES # BLD: 1.5 K/MCL (ref 1–4)
LYMPHOCYTES NFR BLD: 20 %
MCH RBC QN AUTO: 29.7 PG (ref 26–34)
MCHC RBC AUTO-ENTMCNC: 34.5 G/DL (ref 32–36.5)
MCV RBC AUTO: 86.1 FL (ref 78–100)
NEUTROPHILS # BLD: 5.3 K/MCL (ref 1.8–7.7)
NEUTROPHILS NFR BLD: 73 %
PLATELET # BLD: 187 K/MCL (ref 140–450)
RBC # BLD: 5.18 MIL/MCL (ref 4.5–5.9)
WBC # BLD: 7.3 K/MCL (ref 4.2–11)

## 2019-07-24 ASSESSMENT — PAIN SCALES - GENERAL: PAINLEVEL_OUTOF10: 4

## 2019-07-31 ENCOUNTER — APPOINTMENT (OUTPATIENT)
Dept: CT IMAGING | Facility: HOSPITAL | Age: 54
End: 2019-07-31
Attending: EMERGENCY MEDICINE
Payer: COMMERCIAL

## 2019-07-31 ENCOUNTER — DOCUMENTATION (OUTPATIENT)
Dept: GERIATRIC MEDICINE | Age: 54
End: 2019-07-31

## 2019-07-31 ENCOUNTER — HOSPITAL ENCOUNTER (EMERGENCY)
Facility: HOSPITAL | Age: 54
Discharge: HOME | End: 2019-07-31
Attending: EMERGENCY MEDICINE
Payer: COMMERCIAL

## 2019-07-31 VITALS
RESPIRATION RATE: 16 BRPM | HEIGHT: 66 IN | OXYGEN SATURATION: 97 % | TEMPERATURE: 97 F | SYSTOLIC BLOOD PRESSURE: 138 MMHG | WEIGHT: 176 LBS | BODY MASS INDEX: 28.28 KG/M2 | DIASTOLIC BLOOD PRESSURE: 74 MMHG | HEART RATE: 58 BPM

## 2019-07-31 DIAGNOSIS — N20.1 LEFT URETERAL STONE: Primary | ICD-10-CM

## 2019-07-31 DIAGNOSIS — R10.9 FLANK PAIN: ICD-10-CM

## 2019-07-31 LAB
ALBUMIN SERPL-MCNC: 4.4 G/DL (ref 3.4–5)
ALBUMIN/GLOB SERPL: 1.3 {RATIO} (ref 1–2)
ALP LIVER SERPL-CCNC: 113 U/L (ref 45–117)
ALT SERPL-CCNC: 34 U/L (ref 16–61)
ANION GAP SERPL CALC-SCNC: 6 MMOL/L (ref 0–18)
AST SERPL-CCNC: 23 U/L (ref 15–37)
BASOPHILS # BLD AUTO: 0.06 X10(3) UL (ref 0–0.2)
BASOPHILS NFR BLD AUTO: 0.7 %
BILIRUB SERPL-MCNC: 1.5 MG/DL (ref 0.1–2)
BILIRUB UR QL STRIP.AUTO: NEGATIVE
BUN BLD-MCNC: 8 MG/DL (ref 7–18)
BUN/CREAT SERPL: 8 (ref 10–20)
CALCIUM BLD-MCNC: 9.5 MG/DL (ref 8.5–10.1)
CHLORIDE SERPL-SCNC: 106 MMOL/L (ref 98–112)
CO2 SERPL-SCNC: 29 MMOL/L (ref 21–32)
CREAT BLD-MCNC: 1 MG/DL (ref 0.7–1.3)
DEPRECATED RDW RBC AUTO: 41.2 FL (ref 35.1–46.3)
EOSINOPHIL # BLD AUTO: 0.28 X10(3) UL (ref 0–0.7)
EOSINOPHIL NFR BLD AUTO: 3 %
ERYTHROCYTE [DISTWIDTH] IN BLOOD BY AUTOMATED COUNT: 13.3 % (ref 11–15)
GLOBULIN PLAS-MCNC: 3.4 G/DL (ref 2.8–4.4)
GLUCOSE BLD-MCNC: 100 MG/DL (ref 70–99)
GLUCOSE UR STRIP.AUTO-MCNC: NEGATIVE MG/DL
HCT VFR BLD AUTO: 48.7 % (ref 39–53)
HGB BLD-MCNC: 16.7 G/DL (ref 13–17.5)
IMM GRANULOCYTES # BLD AUTO: 0.03 X10(3) UL (ref 0–1)
IMM GRANULOCYTES NFR BLD: 0.3 %
LEUKOCYTE ESTERASE UR QL STRIP.AUTO: NEGATIVE
LIPASE SERPL-CCNC: 112 U/L (ref 73–393)
LYMPHOCYTES # BLD AUTO: 1.87 X10(3) UL (ref 1–4)
LYMPHOCYTES NFR BLD AUTO: 20.3 %
M PROTEIN MFR SERPL ELPH: 7.8 G/DL (ref 6.4–8.2)
MCH RBC QN AUTO: 29.1 PG (ref 26–34)
MCHC RBC AUTO-ENTMCNC: 34.3 G/DL (ref 31–37)
MCV RBC AUTO: 85 FL (ref 80–100)
MONOCYTES # BLD AUTO: 0.67 X10(3) UL (ref 0.1–1)
MONOCYTES NFR BLD AUTO: 7.3 %
NEUTROPHILS # BLD AUTO: 6.31 X10 (3) UL (ref 1.5–7.7)
NEUTROPHILS # BLD AUTO: 6.31 X10(3) UL (ref 1.5–7.7)
NEUTROPHILS NFR BLD AUTO: 68.4 %
NITRITE UR QL STRIP.AUTO: NEGATIVE
OSMOLALITY SERPL CALC.SUM OF ELEC: 290 MOSM/KG (ref 275–295)
PH UR STRIP.AUTO: 6 [PH] (ref 4.5–8)
PLATELET # BLD AUTO: 164 10(3)UL (ref 150–450)
POTASSIUM SERPL-SCNC: 3.9 MMOL/L (ref 3.5–5.1)
PROT UR STRIP.AUTO-MCNC: 30 MG/DL
RBC # BLD AUTO: 5.73 X10(6)UL (ref 4.3–5.7)
RBC #/AREA URNS AUTO: >10 /HPF
SODIUM SERPL-SCNC: 141 MMOL/L (ref 136–145)
SP GR UR STRIP.AUTO: 1.01 (ref 1–1.03)
UROBILINOGEN UR STRIP.AUTO-MCNC: <2 MG/DL
WBC # BLD AUTO: 9.2 X10(3) UL (ref 4–11)

## 2019-07-31 PROCEDURE — 96361 HYDRATE IV INFUSION ADD-ON: CPT

## 2019-07-31 PROCEDURE — 96376 TX/PRO/DX INJ SAME DRUG ADON: CPT

## 2019-07-31 PROCEDURE — 83690 ASSAY OF LIPASE: CPT

## 2019-07-31 PROCEDURE — 99285 EMERGENCY DEPT VISIT HI MDM: CPT

## 2019-07-31 PROCEDURE — 80053 COMPREHEN METABOLIC PANEL: CPT | Performed by: EMERGENCY MEDICINE

## 2019-07-31 PROCEDURE — 96375 TX/PRO/DX INJ NEW DRUG ADDON: CPT

## 2019-07-31 PROCEDURE — 81001 URINALYSIS AUTO W/SCOPE: CPT | Performed by: EMERGENCY MEDICINE

## 2019-07-31 PROCEDURE — 80053 COMPREHEN METABOLIC PANEL: CPT

## 2019-07-31 PROCEDURE — 83690 ASSAY OF LIPASE: CPT | Performed by: EMERGENCY MEDICINE

## 2019-07-31 PROCEDURE — 87086 URINE CULTURE/COLONY COUNT: CPT | Performed by: EMERGENCY MEDICINE

## 2019-07-31 PROCEDURE — 74176 CT ABD & PELVIS W/O CONTRAST: CPT | Performed by: EMERGENCY MEDICINE

## 2019-07-31 PROCEDURE — 96365 THER/PROPH/DIAG IV INF INIT: CPT

## 2019-07-31 PROCEDURE — 85025 COMPLETE CBC W/AUTO DIFF WBC: CPT

## 2019-07-31 PROCEDURE — 85025 COMPLETE CBC W/AUTO DIFF WBC: CPT | Performed by: EMERGENCY MEDICINE

## 2019-07-31 RX ORDER — TAMSULOSIN HYDROCHLORIDE 0.4 MG/1
0.4 CAPSULE ORAL DAILY
Qty: 7 CAPSULE | Refills: 0 | Status: SHIPPED | OUTPATIENT
Start: 2019-07-31 | End: 2019-08-02

## 2019-07-31 RX ORDER — ONDANSETRON 2 MG/ML
4 INJECTION INTRAMUSCULAR; INTRAVENOUS ONCE
Status: DISCONTINUED | OUTPATIENT
Start: 2019-07-31 | End: 2019-07-31

## 2019-07-31 RX ORDER — MORPHINE SULFATE 4 MG/ML
4 INJECTION, SOLUTION INTRAMUSCULAR; INTRAVENOUS ONCE
Status: DISCONTINUED | OUTPATIENT
Start: 2019-07-31 | End: 2019-07-31

## 2019-07-31 RX ORDER — HYDROCODONE BITARTRATE AND ACETAMINOPHEN 7.5; 325 MG/1; MG/1
1-2 TABLET ORAL EVERY 6 HOURS PRN
Qty: 16 TABLET | Refills: 0 | Status: SHIPPED | OUTPATIENT
Start: 2019-07-31 | End: 2019-08-07

## 2019-07-31 RX ORDER — ONDANSETRON 2 MG/ML
4 INJECTION INTRAMUSCULAR; INTRAVENOUS ONCE
Status: COMPLETED | OUTPATIENT
Start: 2019-07-31 | End: 2019-07-31

## 2019-07-31 RX ORDER — HYDROCODONE BITARTRATE AND ACETAMINOPHEN 5; 325 MG/1; MG/1
2 TABLET ORAL ONCE
Status: COMPLETED | OUTPATIENT
Start: 2019-07-31 | End: 2019-07-31

## 2019-07-31 RX ORDER — KETOROLAC TROMETHAMINE 30 MG/ML
30 INJECTION, SOLUTION INTRAMUSCULAR; INTRAVENOUS ONCE
Status: DISCONTINUED | OUTPATIENT
Start: 2019-07-31 | End: 2019-07-31

## 2019-07-31 RX ORDER — KETOROLAC TROMETHAMINE 30 MG/ML
INJECTION, SOLUTION INTRAMUSCULAR; INTRAVENOUS
Status: DISCONTINUED
Start: 2019-07-31 | End: 2019-07-31

## 2019-07-31 RX ORDER — HYDROMORPHONE HYDROCHLORIDE 1 MG/ML
0.5 INJECTION, SOLUTION INTRAMUSCULAR; INTRAVENOUS; SUBCUTANEOUS ONCE
Status: COMPLETED | OUTPATIENT
Start: 2019-07-31 | End: 2019-07-31

## 2019-07-31 RX ORDER — CEPHALEXIN 500 MG/1
500 CAPSULE ORAL 4 TIMES DAILY
Qty: 40 CAPSULE | Refills: 0 | Status: SHIPPED | OUTPATIENT
Start: 2019-07-31 | End: 2019-08-10

## 2019-07-31 RX ORDER — KETOROLAC TROMETHAMINE 30 MG/ML
30 INJECTION, SOLUTION INTRAMUSCULAR; INTRAVENOUS ONCE
Status: COMPLETED | OUTPATIENT
Start: 2019-07-31 | End: 2019-07-31

## 2019-07-31 RX ORDER — SODIUM CHLORIDE 9 MG/ML
INJECTION, SOLUTION INTRAVENOUS CONTINUOUS
Status: DISCONTINUED | OUTPATIENT
Start: 2019-07-31 | End: 2019-07-31

## 2019-07-31 RX ORDER — HYDROMORPHONE HYDROCHLORIDE 1 MG/ML
INJECTION, SOLUTION INTRAMUSCULAR; INTRAVENOUS; SUBCUTANEOUS
Status: DISCONTINUED
Start: 2019-07-31 | End: 2019-07-31 | Stop reason: WASHOUT

## 2019-07-31 NOTE — ED PROVIDER NOTES
Patient Seen in: [facility] Emergency Department    History   Patient presents with:  Abdomen/Flank Pain (GI/)    Stated Complaint: abd pain    HPI    49-year-old male presents to the emergency department with left flank pain that started last nig 79.8 kg   SpO2 97%   BMI 28.41 kg/m²         Physical Exam    Pleasant but uncomfortable 12-year-old male lying on an emergency department bed HEENT exam is within normal limits neck is supple lungs are clear to auscultation heart has regular rate and rhyt Unenhanced multislice CT scanning from above the kidneys to     below the urinary bladder. 2D rendering are generated on the CT scanner     workstation to localize potential stones in the cranio-caudal plane. Dose     reduction techniques were used. range.  Ureters are otherwise clear. Bladder clear. Mild     prostatic enlargement. Colonic diverticulosis without evidence of acute diverticulitis. Normal     appendix. No evidence of biliary tree dilatation. Status post     appendectomy.

## 2019-07-31 NOTE — ED INITIAL ASSESSMENT (HPI)
Patient to ED for left sided flank/abdominal pain that started last night with nausea and vomiting. Patient states has a history of kidney stones and diverticulitis. Patient denies fevers. Denies urinary symptoms. States has had nut and seeds recently.

## 2019-08-01 RX ORDER — TAMSULOSIN HYDROCHLORIDE 0.4 MG/1
CAPSULE ORAL
Qty: 30 CAPSULE | Refills: 2 | Status: SHIPPED | OUTPATIENT
Start: 2019-08-01 | End: 2019-10-30 | Stop reason: SDUPTHER

## 2019-08-02 ENCOUNTER — HOSPITAL ENCOUNTER (OUTPATIENT)
Facility: HOSPITAL | Age: 54
Setting detail: OBSERVATION
Discharge: HOME | End: 2019-08-03
Attending: EMERGENCY MEDICINE | Admitting: INTERNAL MEDICINE
Payer: COMMERCIAL

## 2019-08-02 ENCOUNTER — ANESTHESIA EVENT (OUTPATIENT)
Dept: SURGERY | Facility: HOSPITAL | Age: 54
End: 2019-08-02
Payer: COMMERCIAL

## 2019-08-02 DIAGNOSIS — N20.0 KIDNEY STONE: Primary | ICD-10-CM

## 2019-08-02 PROBLEM — E87.6 HYPOKALEMIA: Status: ACTIVE | Noted: 2019-08-02

## 2019-08-02 LAB
ALBUMIN SERPL-MCNC: 4 G/DL (ref 3.4–5)
ALBUMIN/GLOB SERPL: 1.2 {RATIO} (ref 1–2)
ALP LIVER SERPL-CCNC: 107 U/L (ref 45–117)
ALT SERPL-CCNC: 38 U/L (ref 16–61)
ANION GAP SERPL CALC-SCNC: 5 MMOL/L (ref 0–18)
AST SERPL-CCNC: 24 U/L (ref 15–37)
BASOPHILS # BLD AUTO: 0.07 X10(3) UL (ref 0–0.2)
BASOPHILS NFR BLD AUTO: 0.8 %
BILIRUB SERPL-MCNC: 1.1 MG/DL (ref 0.1–2)
BILIRUB UR QL STRIP.AUTO: NEGATIVE
BUN BLD-MCNC: 9 MG/DL (ref 7–18)
BUN/CREAT SERPL: 8.8 (ref 10–20)
CALCIUM BLD-MCNC: 9.3 MG/DL (ref 8.5–10.1)
CHLORIDE SERPL-SCNC: 107 MMOL/L (ref 98–112)
CLARITY UR REFRACT.AUTO: CLEAR
CO2 SERPL-SCNC: 28 MMOL/L (ref 21–32)
CREAT BLD-MCNC: 1.02 MG/DL (ref 0.7–1.3)
DEPRECATED RDW RBC AUTO: 40.1 FL (ref 35.1–46.3)
EOSINOPHIL # BLD AUTO: 0.28 X10(3) UL (ref 0–0.7)
EOSINOPHIL NFR BLD AUTO: 3.1 %
ERYTHROCYTE [DISTWIDTH] IN BLOOD BY AUTOMATED COUNT: 13.2 % (ref 11–15)
GLOBULIN PLAS-MCNC: 3.4 G/DL (ref 2.8–4.4)
GLUCOSE BLD-MCNC: 90 MG/DL (ref 70–99)
GLUCOSE UR STRIP.AUTO-MCNC: NEGATIVE MG/DL
HCT VFR BLD AUTO: 44.2 % (ref 39–53)
HGB BLD-MCNC: 15.3 G/DL (ref 13–17.5)
IMM GRANULOCYTES # BLD AUTO: 0.02 X10(3) UL (ref 0–1)
IMM GRANULOCYTES NFR BLD: 0.2 %
KETONES UR STRIP.AUTO-MCNC: 20 MG/DL
LEUKOCYTE ESTERASE UR QL STRIP.AUTO: NEGATIVE
LYMPHOCYTES # BLD AUTO: 1.61 X10(3) UL (ref 1–4)
LYMPHOCYTES NFR BLD AUTO: 18.1 %
M PROTEIN MFR SERPL ELPH: 7.4 G/DL (ref 6.4–8.2)
MCH RBC QN AUTO: 29.2 PG (ref 26–34)
MCHC RBC AUTO-ENTMCNC: 34.6 G/DL (ref 31–37)
MCV RBC AUTO: 84.4 FL (ref 80–100)
MONOCYTES # BLD AUTO: 0.74 X10(3) UL (ref 0.1–1)
MONOCYTES NFR BLD AUTO: 8.3 %
NEUTROPHILS # BLD AUTO: 6.18 X10 (3) UL (ref 1.5–7.7)
NEUTROPHILS # BLD AUTO: 6.18 X10(3) UL (ref 1.5–7.7)
NEUTROPHILS NFR BLD AUTO: 69.5 %
NITRITE UR QL STRIP.AUTO: NEGATIVE
OSMOLALITY SERPL CALC.SUM OF ELEC: 288 MOSM/KG (ref 275–295)
PH UR STRIP.AUTO: 7 [PH] (ref 4.5–8)
PLATELET # BLD AUTO: 155 10(3)UL (ref 150–450)
POTASSIUM SERPL-SCNC: 3.4 MMOL/L (ref 3.5–5.1)
PROT UR STRIP.AUTO-MCNC: NEGATIVE MG/DL
RBC # BLD AUTO: 5.24 X10(6)UL (ref 4.3–5.7)
SODIUM SERPL-SCNC: 140 MMOL/L (ref 136–145)
SP GR UR STRIP.AUTO: 1.01 (ref 1–1.03)
UROBILINOGEN UR STRIP.AUTO-MCNC: <2 MG/DL
WBC # BLD AUTO: 8.9 X10(3) UL (ref 4–11)

## 2019-08-02 PROCEDURE — 99220 INITIAL OBSERVATION CARE,LEVL III: CPT | Performed by: HOSPITALIST

## 2019-08-02 RX ORDER — KETOROLAC TROMETHAMINE 30 MG/ML
30 INJECTION, SOLUTION INTRAMUSCULAR; INTRAVENOUS EVERY 6 HOURS PRN
Status: DISCONTINUED | OUTPATIENT
Start: 2019-08-02 | End: 2019-08-03

## 2019-08-02 RX ORDER — ROPINIROLE 1 MG/1
1 TABLET, FILM COATED ORAL NIGHTLY
Status: DISCONTINUED | OUTPATIENT
Start: 2019-08-02 | End: 2019-08-03

## 2019-08-02 RX ORDER — METOCLOPRAMIDE HYDROCHLORIDE 5 MG/ML
10 INJECTION INTRAMUSCULAR; INTRAVENOUS EVERY 8 HOURS PRN
Status: DISCONTINUED | OUTPATIENT
Start: 2019-08-02 | End: 2019-08-03

## 2019-08-02 RX ORDER — POTASSIUM CHLORIDE 20 MEQ/1
40 TABLET, EXTENDED RELEASE ORAL EVERY 4 HOURS
Status: ACTIVE | OUTPATIENT
Start: 2019-08-02 | End: 2019-08-03

## 2019-08-02 RX ORDER — HYDROMORPHONE HYDROCHLORIDE 1 MG/ML
0.1 INJECTION, SOLUTION INTRAMUSCULAR; INTRAVENOUS; SUBCUTANEOUS EVERY 2 HOUR PRN
Status: DISCONTINUED | OUTPATIENT
Start: 2019-08-02 | End: 2019-08-03

## 2019-08-02 RX ORDER — HYDROCODONE BITARTRATE AND ACETAMINOPHEN 10; 325 MG/1; MG/1
1-2 TABLET ORAL EVERY 6 HOURS PRN
Status: DISCONTINUED | OUTPATIENT
Start: 2019-08-02 | End: 2019-08-03

## 2019-08-02 RX ORDER — MORPHINE SULFATE 4 MG/ML
4 INJECTION, SOLUTION INTRAMUSCULAR; INTRAVENOUS EVERY 30 MIN PRN
Status: DISPENSED | OUTPATIENT
Start: 2019-08-02 | End: 2019-08-02

## 2019-08-02 RX ORDER — TRAMADOL HYDROCHLORIDE 50 MG/1
50 TABLET ORAL EVERY 6 HOURS PRN
Status: DISCONTINUED | OUTPATIENT
Start: 2019-08-02 | End: 2019-08-03

## 2019-08-02 RX ORDER — ALFUZOSIN HYDROCHLORIDE 10 MG/1
10 TABLET, EXTENDED RELEASE ORAL
Status: DISCONTINUED | OUTPATIENT
Start: 2019-08-03 | End: 2019-08-03

## 2019-08-02 RX ORDER — ONDANSETRON 2 MG/ML
4 INJECTION INTRAMUSCULAR; INTRAVENOUS EVERY 6 HOURS PRN
Status: DISCONTINUED | OUTPATIENT
Start: 2019-08-02 | End: 2019-08-03

## 2019-08-02 RX ORDER — TAMSULOSIN HYDROCHLORIDE 0.4 MG/1
0.4 CAPSULE ORAL DAILY
COMMUNITY

## 2019-08-02 RX ORDER — DOCUSATE SODIUM 100 MG/1
100 CAPSULE, LIQUID FILLED ORAL 2 TIMES DAILY
Status: DISCONTINUED | OUTPATIENT
Start: 2019-08-02 | End: 2019-08-03

## 2019-08-02 RX ORDER — ONDANSETRON 2 MG/ML
4 INJECTION INTRAMUSCULAR; INTRAVENOUS ONCE
Status: COMPLETED | OUTPATIENT
Start: 2019-08-02 | End: 2019-08-02

## 2019-08-02 RX ORDER — CEFAZOLIN SODIUM/WATER 2 G/20 ML
2 SYRINGE (ML) INTRAVENOUS
Status: DISCONTINUED | OUTPATIENT
Start: 2019-08-03 | End: 2019-08-03

## 2019-08-02 RX ORDER — ONDANSETRON 2 MG/ML
4 INJECTION INTRAMUSCULAR; INTRAVENOUS EVERY 4 HOURS PRN
Status: DISCONTINUED | OUTPATIENT
Start: 2019-08-02 | End: 2019-08-02

## 2019-08-02 RX ORDER — GABAPENTIN 600 MG/1
600 TABLET ORAL NIGHTLY
Status: DISCONTINUED | OUTPATIENT
Start: 2019-08-02 | End: 2019-08-03

## 2019-08-02 RX ORDER — HYDROMORPHONE HYDROCHLORIDE 1 MG/ML
1 INJECTION, SOLUTION INTRAMUSCULAR; INTRAVENOUS; SUBCUTANEOUS ONCE
Status: DISCONTINUED | OUTPATIENT
Start: 2019-08-02 | End: 2019-08-03

## 2019-08-02 RX ORDER — SODIUM CHLORIDE 9 MG/ML
INJECTION, SOLUTION INTRAVENOUS CONTINUOUS
Status: DISCONTINUED | OUTPATIENT
Start: 2019-08-02 | End: 2019-08-03

## 2019-08-02 RX ORDER — KETOROLAC TROMETHAMINE 30 MG/ML
30 INJECTION, SOLUTION INTRAMUSCULAR; INTRAVENOUS ONCE
Status: COMPLETED | OUTPATIENT
Start: 2019-08-02 | End: 2019-08-02

## 2019-08-02 RX ORDER — HYDROMORPHONE HYDROCHLORIDE 1 MG/ML
1 INJECTION, SOLUTION INTRAMUSCULAR; INTRAVENOUS; SUBCUTANEOUS ONCE
Status: COMPLETED | OUTPATIENT
Start: 2019-08-02 | End: 2019-08-02

## 2019-08-02 RX ORDER — HYDROMORPHONE HYDROCHLORIDE 1 MG/ML
0.2 INJECTION, SOLUTION INTRAMUSCULAR; INTRAVENOUS; SUBCUTANEOUS EVERY 2 HOUR PRN
Status: DISCONTINUED | OUTPATIENT
Start: 2019-08-02 | End: 2019-08-03

## 2019-08-02 RX ORDER — SODIUM CHLORIDE 9 MG/ML
INJECTION, SOLUTION INTRAVENOUS CONTINUOUS
Status: ACTIVE | OUTPATIENT
Start: 2019-08-02 | End: 2019-08-02

## 2019-08-02 NOTE — ANESTHESIA PREPROCEDURE EVALUATION
PRE-OP EVALUATION    Patient Name: Lexi Mckinney    Pre-op Diagnosis: INPT    Procedure(s):  CYSTOSCOPY, LEFT RETROGRADE PYELOGRAM, LEFT URETERAL STONE EXTRACTION, POSSIBLE LASER LITHROTRIPSY , INSERTION OF LEFT URETHRAL STENT    Surgeon(s) and Role: Cardiovascular        Exercise tolerance: good     MET: >4                                           Endo/Other                                  Pulmonary      (+) asthma              (+) sleep apnea       Neuro/Psych

## 2019-08-02 NOTE — H&P
[facility]  History and Physical     Alonzo Sae Patient Status:  Observation    3/21/1965 MRN [id number]   [facility] 3NW-A Attending Madina Godoy MD   Hosp Day # 0 PCP Keila Pnion MD     Chief Complaint: flank goldie tablet Rfl: 0   TraMADol HCl 50 MG Oral Tab Take 50 mg by mouth every 6 (six) hours as needed for Pain. Disp:  Rfl:    rOPINIRole HCl 1 MG Oral Tab Take 1 mg by mouth nightly.    Disp:  Rfl: 3   gabapentin (NEURONTIN) 600 MG Oral Tab Take 600 mg by mouth ni the last 168 hours. Imaging: Imaging data reviewed in Epic. ASSESSMENT / PLAN:     1. L obstructing renal stone 3.7 mm w/ hydronephrosis  1. No evidence of UTI  2. Urology consult  3. Cystoscopy for tomorrow  4. N.p.o. after midnight  2. Hx dvt  3.

## 2019-08-02 NOTE — ED INITIAL ASSESSMENT (HPI)
Here on Wednesday, left sided flank pain, dx with kidney stone. Still in pain, went to urology today and cant do surgery until Monday. At 0930 took a norco and only took the edge off.  By the time patient made it home from urology office pain was excruciati

## 2019-08-02 NOTE — PLAN OF CARE
Problem: Patient/Family Goals  Goal: Patient/Family Long Term Goal  Description  Patient's Long Term Goal:   - have the stone out and go home    Interventions:  - IVF, NPO after MN, Cysto in am.  - See additional Care Plan goals for specific intervention fluid and nutrition restrictions as appropriate  Outcome: Progressing     Problem: HEMATOLOGIC - ADULT  Goal: Maintains hematologic stability  Description  INTERVENTIONS  - Assess for signs and symptoms of bleeding or hemorrhage  - Monitor labs and vital s

## 2019-08-02 NOTE — ED PROVIDER NOTES
Patient Seen in: [facility] Emergency Department    History   Patient presents with:  Flank Pain    Stated Complaint: kidney stone    HPI    Denise Jones is a 58-year-old male who presents today for continued pain associated with a left ureteral calculus. kidney stone  Other systems are as noted in HPI. Constitutional and vital signs reviewed. All other systems reviewed and negative except as noted above.     Physical Exam     ED Triage Vitals [08/02/19 1314]   BP (!) 159/92   Pulse 74   Resp 25   Temp DIFFERENTIAL          This case is discussed with Dr. Dellar Severs who evaluates patient agrees with the plan of care.     This case is discussed with Jesus Corea who request that the patient be made n.p.o. at midnight and come in for pain control and possible

## 2019-08-02 NOTE — PLAN OF CARE
NURSING ADMISSION NOTE      Patient admitted via Cart. Awake, a/o x4. Denies nausea. Left low flank pain is increasing again, rated at 7/10. Medicated with IV Morphine as ordered. Dr. Agnieszka Amos here to see ptDeysi Winchester for regular diet for dinner.  NPO after m

## 2019-08-02 NOTE — CONSULTS
[facility] Urology   Consultation Note    Ayleen Walters Patient Status:  Emergency    3/21/1965 MRN [id number]   Location [address] [facility] Attending Jarrett Monique MD   Hosp Day # 0 PCP Nadiya Maldonado MD     Reason for Systems:  Pertinent items are noted in HPI.     Physical Exam:  BP (!) 171/96   Pulse 66   Temp 98.2 °F (36.8 °C) (Oral)   Resp 14   Ht 5' 6\" (1.676 m)   Wt 177 lb (80.3 kg)   SpO2 98%   BMI 28.57 kg/m²   GENERAL: the patient is resting in bed in no acute diverticulitis. Normal appendix. There is mild fatty infiltration of the small bowel mesenteric consistent with mesenteric panniculitis. ABDOMINAL WALL:  No mass or hernia. BONES:  No bony lesion or fracture. PELVIC ORGANS:  Normal for age.     LUNG this is not possible or indicated and in those cases there may be need for future procedures to remove remaining stones.  Typically, the procedure causes some swelling in the ureter which can cause obstruction and pain, for that reason a ureteral stent is o

## 2019-08-03 ENCOUNTER — ANESTHESIA (OUTPATIENT)
Dept: SURGERY | Facility: HOSPITAL | Age: 54
End: 2019-08-03
Payer: COMMERCIAL

## 2019-08-03 ENCOUNTER — APPOINTMENT (OUTPATIENT)
Dept: GENERAL RADIOLOGY | Facility: HOSPITAL | Age: 54
End: 2019-08-03
Attending: UROLOGY
Payer: COMMERCIAL

## 2019-08-03 VITALS
OXYGEN SATURATION: 100 % | BODY MASS INDEX: 28.45 KG/M2 | HEART RATE: 60 BPM | HEIGHT: 66 IN | WEIGHT: 177 LBS | RESPIRATION RATE: 14 BRPM | SYSTOLIC BLOOD PRESSURE: 139 MMHG | TEMPERATURE: 98 F | DIASTOLIC BLOOD PRESSURE: 73 MMHG

## 2019-08-03 LAB
ANION GAP SERPL CALC-SCNC: 3 MMOL/L (ref 0–18)
BUN BLD-MCNC: 10 MG/DL (ref 7–18)
BUN/CREAT SERPL: 8.3 (ref 10–20)
CALCIUM BLD-MCNC: 8 MG/DL (ref 8.5–10.1)
CHLORIDE SERPL-SCNC: 109 MMOL/L (ref 98–112)
CO2 SERPL-SCNC: 29 MMOL/L (ref 21–32)
CREAT BLD-MCNC: 1.2 MG/DL (ref 0.7–1.3)
DEPRECATED RDW RBC AUTO: 41.5 FL (ref 35.1–46.3)
ERYTHROCYTE [DISTWIDTH] IN BLOOD BY AUTOMATED COUNT: 13.2 % (ref 11–15)
GLUCOSE BLD-MCNC: 97 MG/DL (ref 70–99)
HCT VFR BLD AUTO: 40.3 % (ref 39–53)
HGB BLD-MCNC: 13.5 G/DL (ref 13–17.5)
MCH RBC QN AUTO: 28.9 PG (ref 26–34)
MCHC RBC AUTO-ENTMCNC: 33.5 G/DL (ref 31–37)
MCV RBC AUTO: 86.3 FL (ref 80–100)
OSMOLALITY SERPL CALC.SUM OF ELEC: 291 MOSM/KG (ref 275–295)
PLATELET # BLD AUTO: 129 10(3)UL (ref 150–450)
POTASSIUM SERPL-SCNC: 4.5 MMOL/L (ref 3.5–5.1)
POTASSIUM SERPL-SCNC: 4.5 MMOL/L (ref 3.5–5.1)
RBC # BLD AUTO: 4.67 X10(6)UL (ref 4.3–5.7)
SODIUM SERPL-SCNC: 141 MMOL/L (ref 136–145)
WBC # BLD AUTO: 10.3 X10(3) UL (ref 4–11)

## 2019-08-03 PROCEDURE — 0TF78ZZ FRAGMENTATION IN LEFT URETER, VIA NATURAL OR ARTIFICIAL OPENING ENDOSCOPIC: ICD-10-PCS | Performed by: UROLOGY

## 2019-08-03 PROCEDURE — BT1FZZZ FLUOROSCOPY OF LEFT KIDNEY, URETER AND BLADDER: ICD-10-PCS | Performed by: UROLOGY

## 2019-08-03 PROCEDURE — 99217 OBSERVATION CARE DISCHARGE: CPT | Performed by: INTERNAL MEDICINE

## 2019-08-03 PROCEDURE — 0T778DZ DILATION OF LEFT URETER WITH INTRALUMINAL DEVICE, VIA NATURAL OR ARTIFICIAL OPENING ENDOSCOPIC: ICD-10-PCS | Performed by: UROLOGY

## 2019-08-03 DEVICE — STENT URET 6F 26CM WO GW INL: Type: IMPLANTABLE DEVICE | Site: URETER | Status: FUNCTIONAL

## 2019-08-03 RX ORDER — CEFAZOLIN SODIUM/WATER 2 G/20 ML
SYRINGE (ML) INTRAVENOUS
Status: DISCONTINUED | OUTPATIENT
Start: 2019-08-03 | End: 2019-08-03 | Stop reason: HOSPADM

## 2019-08-03 RX ORDER — HYDROMORPHONE HYDROCHLORIDE 1 MG/ML
0.4 INJECTION, SOLUTION INTRAMUSCULAR; INTRAVENOUS; SUBCUTANEOUS EVERY 5 MIN PRN
Status: DISCONTINUED | OUTPATIENT
Start: 2019-08-03 | End: 2019-08-03 | Stop reason: HOSPADM

## 2019-08-03 RX ORDER — PHENAZOPYRIDINE HYDROCHLORIDE 200 MG/1
200 TABLET, FILM COATED ORAL 3 TIMES DAILY PRN
Qty: 21 TABLET | Refills: 0 | Status: SHIPPED | OUTPATIENT
Start: 2019-08-03

## 2019-08-03 RX ORDER — MEPERIDINE HYDROCHLORIDE 25 MG/ML
12.5 INJECTION INTRAMUSCULAR; INTRAVENOUS; SUBCUTANEOUS AS NEEDED
Status: DISCONTINUED | OUTPATIENT
Start: 2019-08-03 | End: 2019-08-03 | Stop reason: HOSPADM

## 2019-08-03 RX ORDER — LIDOCAINE HYDROCHLORIDE 20 MG/ML
JELLY TOPICAL AS NEEDED
Status: DISCONTINUED | OUTPATIENT
Start: 2019-08-03 | End: 2019-08-03 | Stop reason: HOSPADM

## 2019-08-03 RX ORDER — ONDANSETRON 2 MG/ML
4 INJECTION INTRAMUSCULAR; INTRAVENOUS AS NEEDED
Status: DISCONTINUED | OUTPATIENT
Start: 2019-08-03 | End: 2019-08-03 | Stop reason: HOSPADM

## 2019-08-03 RX ORDER — SODIUM CHLORIDE, SODIUM LACTATE, POTASSIUM CHLORIDE, CALCIUM CHLORIDE 600; 310; 30; 20 MG/100ML; MG/100ML; MG/100ML; MG/100ML
INJECTION, SOLUTION INTRAVENOUS CONTINUOUS
Status: DISCONTINUED | OUTPATIENT
Start: 2019-08-03 | End: 2019-08-03 | Stop reason: HOSPADM

## 2019-08-03 RX ORDER — MAGNESIUM HYDROXIDE 1200 MG/15ML
LIQUID ORAL CONTINUOUS PRN
Status: COMPLETED | OUTPATIENT
Start: 2019-08-03 | End: 2019-08-03

## 2019-08-03 RX ORDER — LABETALOL HYDROCHLORIDE 5 MG/ML
5 INJECTION, SOLUTION INTRAVENOUS EVERY 5 MIN PRN
Status: DISCONTINUED | OUTPATIENT
Start: 2019-08-03 | End: 2019-08-03 | Stop reason: HOSPADM

## 2019-08-03 RX ORDER — METOCLOPRAMIDE HYDROCHLORIDE 5 MG/ML
10 INJECTION INTRAMUSCULAR; INTRAVENOUS AS NEEDED
Status: DISCONTINUED | OUTPATIENT
Start: 2019-08-03 | End: 2019-08-03 | Stop reason: HOSPADM

## 2019-08-03 RX ORDER — PHENAZOPYRIDINE HYDROCHLORIDE 200 MG/1
200 TABLET, FILM COATED ORAL 3 TIMES DAILY PRN
Status: DISCONTINUED | OUTPATIENT
Start: 2019-08-03 | End: 2019-08-03

## 2019-08-03 RX ORDER — HYDROMORPHONE HYDROCHLORIDE 1 MG/ML
INJECTION, SOLUTION INTRAMUSCULAR; INTRAVENOUS; SUBCUTANEOUS
Status: COMPLETED
Start: 2019-08-03 | End: 2019-08-03

## 2019-08-03 RX ORDER — MIDAZOLAM HYDROCHLORIDE 1 MG/ML
1 INJECTION INTRAMUSCULAR; INTRAVENOUS EVERY 5 MIN PRN
Status: DISCONTINUED | OUTPATIENT
Start: 2019-08-03 | End: 2019-08-03 | Stop reason: HOSPADM

## 2019-08-03 RX ORDER — NALOXONE HYDROCHLORIDE 0.4 MG/ML
80 INJECTION, SOLUTION INTRAMUSCULAR; INTRAVENOUS; SUBCUTANEOUS AS NEEDED
Status: DISCONTINUED | OUTPATIENT
Start: 2019-08-03 | End: 2019-08-03 | Stop reason: HOSPADM

## 2019-08-03 NOTE — PROGRESS NOTES
[facility]  Progress Note     Dago Queen Patient Status:  Observation    3/21/1965 MRN [id number]   [facility] 3NW-A Attending Emil Huang, [address] [facility] Day # 0 PCP Corinne Rangel MD     Chief Complaint: flank pain    S: Pa results for input(s): TROP, CK in the last 168 hours. Imaging: Imaging data reviewed in Epic.     Medications:   • HYDROmorphone HCl  1 mg Intravenous Once   • ceFAZolin  2 g Intravenous On Call to OR   • docusate sodium  100 mg Oral BID   • gabapen

## 2019-08-03 NOTE — PLAN OF CARE
Problem: Patient/Family Goals  Goal: Patient/Family Long Term Goal  Description  Patient's Long Term Goal: DISCHARGE HOME    Interventions:  - PAIN TOLERABLE  -TOLERATING DIET  -RETURN TO PREVIOUS ADL'S  - See additional Care Plan goals for specific inte Instruct patient on fluid and nutrition restrictions as appropriate  Outcome: Progressing     Problem: HEMATOLOGIC - ADULT  Goal: Maintains hematologic stability  Description  INTERVENTIONS  - Assess for signs and symptoms of bleeding or hemorrhage  - Alysha

## 2019-08-03 NOTE — BRIEF OP NOTE
Pre-Operative Diagnosis: left ureteral calculus, left renal calculus     Post-Operative Diagnosis: same     Procedure Performed:   Procedure(s):  CYSTOSCOPY, LEFT RETROGRADE PYELOGRAM, laser lithotripsy, LEFT URETERAL and renal STONE EXTRACTION, INSERTION

## 2019-08-03 NOTE — PROGRESS NOTES
NURSING DISCHARGE NOTE    Discharged Home via Wheelchair. Accompanied by Spouse  Belongings Taken by patient/family. PATIENT DISCHARGED HOME IN STABLE CONDITION. PATIENT LEFT THE FLOOR VIA WHEELCHAIR AND WAS ACCOMPANIED BY HIS SIGNIFICANT OTHER.  SHARLA

## 2019-08-03 NOTE — OPERATIVE REPORT
[facility]    PATIENT'S NAME: Jerome Torres   ATTENDING PHYSICIAN: Rachel Mayers DO   OPERATING PHYSICIAN: Rachel Mayers DO   PATIENT ACCOUNT#:   [de-identified]    LOCATION:  3NW-A [facility]  MEDICAL RECORD #:   [id number]       DATE OF B satisfactory general anesthesia had been achieved, the patient's legs were raised to a dorsal lithotomy position.   A well-lubricated 21-Czech rigid cystoscope was then introduced through a normal anterior male urethra, prostatic fossa demonstrative of no slowly withdrawn, re-examining the entire ureter on the way out, showing no ureteral stone fragments and no ureteral trauma. There was significant edema and erythema where the calculus had been obstructing in the distal ureter.   A flexible ureteroscope wa

## 2019-08-03 NOTE — PLAN OF CARE
Patient denied pain overnight. Complained of nausea, treated with zofran. Voiding through strainer, reinforced to use urinal. No bm overnight/+flatus. VSS. Safety maintained, Cpap overnight. Was unable to take oral potassium, gave IV potassium instead.  Deborah Campbell

## 2019-08-03 NOTE — PROGRESS NOTES
[facility]  Urology Progress Note    Jalen Moody Patient Status:  Observation    3/21/1965 MRN [id number]   [facility] [place] 3NW-A Attending Angel Bateman MD   Hosp Day # 0 PCP Damaris Hamilton MD     Subjective:  Jalen Moody is a(n placement. I discussed with the patient the risks of cystoscopy, retrograde pyelogram, ureteroscopy with possible laser lithotripsy and stone manipulation.   These include but are not limited to hematuria, stent pain (flank pain with urination, urinary u

## 2019-08-03 NOTE — ANESTHESIA POSTPROCEDURE EVALUATION
[address] [facility] Patient Status:  Observation   Age/Gender 47year old male MRN [id number]   [facility] SURGERY Attending Nata Mahmood, [address] [facility] Day # 0 PCP Alie Chance MD       Anesthesia Post-op Note    Procedure(

## 2019-08-03 NOTE — DISCHARGE SUMMARY
[facility] HOSPITAL[facility]  DISCHARGE SUMMARY     Alonzo Quevedo Patient Status:  Observation    3/21/1965 MRN [id number]   [facility] 3NW-A Attending Kisha Conteh, [address] [facility] Day # 0 PCP Keila Pinon MD     Date of Admission: 2019  Jerome Take 1 capsule (500 mg total) by mouth 4 (four) times daily for 10 days. Stop taking on:  8/10/2019  Quantity:  40 capsule  Refills:  0     gabapentin 600 MG Tabs  Commonly known as:  NEURONTIN      Take 600 mg by mouth nightly.    Refills:  0     HYDROco

## 2019-08-04 ENCOUNTER — DOCUMENTATION (OUTPATIENT)
Dept: GERIATRIC MEDICINE | Age: 54
End: 2019-08-04

## 2019-08-08 LAB — CALCULI MASS: 58 MG

## 2019-08-08 NOTE — OPERATIVE REPORT
[facility]    PATIENT'S NAME: Marry Riveromonique   ATTENDING PHYSICIAN: Sheron Singer MD   OPERATING PHYSICIAN: Santos Tobias DO   PATIENT ACCOUNT#:   [de-identified]    LOCATION:  [address] [place] [address]  MEDICAL RECORD #:   [id number]       DATE OF BIRTH: bilateral sequential compression devices had been applied and satisfactory general anesthesia had been achieved, the patient's legs were raised to a dorsal lithotomy position.  A well-lubricated 21-[place] rigid cystoscope was then introduced through a norm within the left renal pelvis.  The ureteroscope was then slowly withdrawn, re-examining the entire ureter on the way out, showing no ureteral stone fragments and no ureteral trauma.  There was significant edema and erythema where the calculus had been obst

## 2019-08-13 ENCOUNTER — TELEPHONE (OUTPATIENT)
Dept: RHEUMATOLOGY | Age: 54
End: 2019-08-13

## 2019-08-13 ENCOUNTER — OFFICE VISIT (OUTPATIENT)
Dept: RHEUMATOLOGY | Age: 54
End: 2019-08-13

## 2019-08-13 VITALS
HEART RATE: 70 BPM | WEIGHT: 179 LBS | DIASTOLIC BLOOD PRESSURE: 94 MMHG | SYSTOLIC BLOOD PRESSURE: 148 MMHG | HEIGHT: 67 IN | BODY MASS INDEX: 28.09 KG/M2 | TEMPERATURE: 97 F

## 2019-08-13 DIAGNOSIS — M79.7 FIBROMYALGIA: ICD-10-CM

## 2019-08-13 DIAGNOSIS — M79.7 FIBROMYALGIA: Primary | ICD-10-CM

## 2019-08-13 DIAGNOSIS — M54.50 BILATERAL LOW BACK PAIN WITHOUT SCIATICA, UNSPECIFIED CHRONICITY: Primary | ICD-10-CM

## 2019-08-13 PROCEDURE — 99213 OFFICE O/P EST LOW 20 MIN: CPT | Performed by: INTERNAL MEDICINE

## 2019-08-13 RX ORDER — HYDROCODONE BITARTRATE AND ACETAMINOPHEN 7.5; 325 MG/1; MG/1
TABLET ORAL
Refills: 0 | COMMUNITY
Start: 2019-07-31 | End: 2020-07-06 | Stop reason: DRUGHIGH

## 2019-08-13 RX ORDER — PHENAZOPYRIDINE HYDROCHLORIDE 200 MG/1
200 TABLET, FILM COATED ORAL
COMMUNITY
Start: 2019-08-03 | End: 2022-07-26 | Stop reason: ALTCHOICE

## 2019-08-13 RX ORDER — HYDROCODONE BITARTRATE AND ACETAMINOPHEN 10; 325 MG/1; MG/1
TABLET ORAL
Refills: 0 | COMMUNITY
Start: 2019-07-03 | End: 2020-07-06 | Stop reason: DRUGHIGH

## 2019-08-13 ASSESSMENT — ENCOUNTER SYMPTOMS
DIAPHORESIS: 0
SLEEP DISTURBANCE: 0
FEVER: 0
SHORTNESS OF BREATH: 0
BACK PAIN: 1
UNEXPECTED WEIGHT CHANGE: 1
COUGH: 0
NAUSEA: 0
WEAKNESS: 0
FATIGUE: 0
ABDOMINAL PAIN: 0
DIARRHEA: 0

## 2019-08-15 ENCOUNTER — DOCUMENTATION (OUTPATIENT)
Dept: GERIATRIC MEDICINE | Age: 54
End: 2019-08-15

## 2019-08-21 ENCOUNTER — TELEPHONE (OUTPATIENT)
Dept: SCHEDULING | Age: 54
End: 2019-08-21

## 2019-08-21 DIAGNOSIS — M51.36 BULGE OF LUMBAR DISC WITHOUT MYELOPATHY: ICD-10-CM

## 2019-08-21 DIAGNOSIS — M54.50 BILATERAL LOW BACK PAIN WITHOUT SCIATICA, UNSPECIFIED CHRONICITY: ICD-10-CM

## 2019-08-21 DIAGNOSIS — M48.061 SPINAL STENOSIS OF LUMBAR REGION, UNSPECIFIED WHETHER NEUROGENIC CLAUDICATION PRESENT: Primary | ICD-10-CM

## 2019-08-23 ENCOUNTER — TELEPHONE (OUTPATIENT)
Dept: SCHEDULING | Age: 54
End: 2019-08-23

## 2019-08-26 ENCOUNTER — TELEPHONE (OUTPATIENT)
Dept: INTEGRATIVE MEDICINE | Facility: CLINIC | Age: 54
End: 2019-08-26

## 2019-08-26 ENCOUNTER — TELEPHONE (OUTPATIENT)
Dept: SCHEDULING | Age: 54
End: 2019-08-26

## 2019-08-26 NOTE — TELEPHONE ENCOUNTER
Contacted David at [facility] office JJ#[phone], requesting referral for acupuncture to be faxed to [phone].

## 2019-08-27 ENCOUNTER — OFFICE VISIT (OUTPATIENT)
Dept: PAIN CLINIC | Facility: CLINIC | Age: 54
End: 2019-08-27

## 2019-08-27 DIAGNOSIS — M54.59 INTRACTABLE LOW BACK PAIN: ICD-10-CM

## 2019-08-27 RX ORDER — MODAFINIL 200 MG/1
TABLET ORAL
Qty: 90 TABLET | Refills: 0 | OUTPATIENT
Start: 2019-08-27 | End: 2019-12-23 | Stop reason: SDUPTHER

## 2019-08-27 NOTE — PROGRESS NOTES
Rut Lorenzana is a 47year old male No chief complaint on file. .   Complaints:  1. Chronic low back pain 6-8 started after 1 year of receiving chemotherapy for leukemia pain is in the middle towards the left. 2. Fibormyalgia  5.  Constipation before but now more r

## 2019-09-03 ENCOUNTER — OFFICE VISIT (OUTPATIENT)
Dept: PAIN CLINIC | Facility: CLINIC | Age: 54
End: 2019-09-03

## 2019-09-03 DIAGNOSIS — M54.50 ACUTE LEFT-SIDED LOW BACK PAIN WITHOUT SCIATICA: ICD-10-CM

## 2019-09-03 NOTE — PROGRESS NOTES
Blaise Aburto is a 47year old male No chief complaint on file. .   Complaints:  1. Chronic low back pain 6-8 started after 1 year of receiving chemotherapy for leukemia pain is in the middle towards the left. 2. Fibormyalgia  5.  Constipation before but now more r

## 2019-09-06 ENCOUNTER — OFFICE VISIT (OUTPATIENT)
Dept: INTEGRATIVE MEDICINE | Facility: CLINIC | Age: 54
End: 2019-09-06

## 2019-09-06 DIAGNOSIS — M54.59 INTRACTABLE LOW BACK PAIN: ICD-10-CM

## 2019-09-09 ENCOUNTER — HOSPITAL (OUTPATIENT)
Dept: OTHER | Age: 54
End: 2019-09-09
Attending: INTERNAL MEDICINE

## 2019-09-10 ENCOUNTER — OFFICE VISIT (OUTPATIENT)
Dept: PAIN CLINIC | Facility: CLINIC | Age: 54
End: 2019-09-10

## 2019-09-10 ENCOUNTER — TELEPHONE (OUTPATIENT)
Dept: SCHEDULING | Age: 54
End: 2019-09-10

## 2019-09-10 DIAGNOSIS — R11.0 NAUSEA: ICD-10-CM

## 2019-09-10 DIAGNOSIS — M54.59 INTRACTABLE LOW BACK PAIN: ICD-10-CM

## 2019-09-10 DIAGNOSIS — M54.50 BILATERAL LOW BACK PAIN WITHOUT SCIATICA, UNSPECIFIED CHRONICITY: ICD-10-CM

## 2019-09-10 DIAGNOSIS — M48.061 SPINAL STENOSIS OF LUMBAR REGION, UNSPECIFIED WHETHER NEUROGENIC CLAUDICATION PRESENT: Primary | ICD-10-CM

## 2019-09-10 DIAGNOSIS — M54.32 SCIATICA OF LEFT SIDE: ICD-10-CM

## 2019-09-10 NOTE — PROGRESS NOTES
Guerda Kramer is a 47year old male No chief complaint on file. .   Complaints:  1. Chronic low back pain 6-8 started after 1 year of receiving chemotherapy for leukemia pain is in the middle towards the left. 2. Fibormyalgia  5.  Constipation before but now more r

## 2019-09-11 ENCOUNTER — HOSPITAL (OUTPATIENT)
Dept: OTHER | Age: 54
End: 2019-09-11
Attending: INTERNAL MEDICINE

## 2019-09-12 ENCOUNTER — OFFICE VISIT (OUTPATIENT)
Dept: INTEGRATIVE MEDICINE | Facility: CLINIC | Age: 54
End: 2019-09-12
Payer: COMMERCIAL

## 2019-09-12 DIAGNOSIS — M54.50 ACUTE BILATERAL LOW BACK PAIN WITHOUT SCIATICA: ICD-10-CM

## 2019-09-12 PROCEDURE — 97810 ACUP 1/> WO ESTIM 1ST 15 MIN: CPT | Performed by: ACUPUNCTURIST

## 2019-09-12 PROCEDURE — 97811 ACUP 1/> W/O ESTIM EA ADD 15: CPT | Performed by: ACUPUNCTURIST

## 2019-09-12 NOTE — PROGRESS NOTES
Chelsy Lozano is a 47year old male No chief complaint on file. .   Complaints:  1. Chronic low back pain 6-8 started after 1 year of receiving chemotherapy for leukemia pain is in the middle towards the left. 2. Fibormyalgia  5.  Constipation before but now more r

## 2019-09-12 NOTE — PROGRESS NOTES
Nathan Cote is a 47year old male No chief complaint on file. .   Complaints:  1. Chronic low back pain 6-8 started after 1 year of receiving chemotherapy for leukemia pain is in the middle towards the left. 2. Fibormyalgia  5.  Constipation before but now more r

## 2019-09-16 ENCOUNTER — HOSPITAL (OUTPATIENT)
Dept: OTHER | Age: 54
End: 2019-09-16
Attending: INTERNAL MEDICINE

## 2019-09-16 LAB — GLUCOSE BLDC GLUCOMTR-MCNC: 90 MG/DL (ref 70–99)

## 2019-09-17 ENCOUNTER — OFFICE VISIT (OUTPATIENT)
Dept: PAIN CLINIC | Facility: CLINIC | Age: 54
End: 2019-09-17
Payer: COMMERCIAL

## 2019-09-17 ENCOUNTER — DOCUMENTATION (OUTPATIENT)
Dept: GERIATRIC MEDICINE | Age: 54
End: 2019-09-17

## 2019-09-17 DIAGNOSIS — M54.32 SCIATICA OF LEFT SIDE: ICD-10-CM

## 2019-09-17 PROCEDURE — 97811 ACUP 1/> W/O ESTIM EA ADD 15: CPT | Performed by: ACUPUNCTURIST

## 2019-09-17 PROCEDURE — 97810 ACUP 1/> WO ESTIM 1ST 15 MIN: CPT | Performed by: ACUPUNCTURIST

## 2019-09-17 NOTE — PROGRESS NOTES
Dino Bennett is a 47year old male No chief complaint on file. .   Complaints:  1. Chronic low back pain 6-8 started after 1 year of receiving chemotherapy for leukemia pain is in the middle towards the left. 2. Fibormyalgia  5.  Constipation before but now more r

## 2019-09-19 ENCOUNTER — PRIOR ORIGINAL RECORDS (OUTPATIENT)
Dept: OTHER | Age: 54
End: 2019-09-19

## 2019-09-19 LAB
ALBUMIN SERPL-MCNC: 3.9 G/DL (ref 3.6–5.1)
ALBUMIN/GLOB SERPL: 1.4 {RATIO} (ref 1–2.4)
ALP SERPL-CCNC: 103 UNITS/L (ref 45–117)
ALT SERPL-CCNC: 27 UNITS/L
ANION GAP SERPL CALC-SCNC: 13 MMOL/L (ref 10–20)
AST SERPL-CCNC: 17 UNITS/L
BASO+EOS+MONOS # BLD: 0.6 K/MCL (ref 0.1–1.1)
BASO+EOS+MONOS NFR BLD: 8 %
BILIRUB SERPL-MCNC: 0.9 MG/DL (ref 0.2–1)
BUN SERPL-MCNC: 13 MG/DL (ref 6–20)
BUN/CREAT SERPL: 17 (ref 7–25)
CALCIUM SERPL-MCNC: 9.1 MG/DL (ref 8.4–10.2)
CHLORIDE SERPL-SCNC: 107 MMOL/L (ref 98–107)
CO2 SERPL-SCNC: 27 MMOL/L (ref 21–32)
CREAT SERPL-MCNC: 0.75 MG/DL (ref 0.67–1.17)
CREATININE CLEARANCE (EST): 133.3 ML/MIN
DIFFERENTIAL METHOD BLD: NORMAL
ERYTHROCYTE [DISTWIDTH] IN BLOOD: 13.9 % (ref 11–15)
GLOBULIN SER-MCNC: 2.8 G/DL (ref 2–4)
GLUCOSE SERPL-MCNC: 131 MG/DL (ref 65–99)
HCT VFR BLD CALC: 42.7 % (ref 39–51)
HGB BLD-MCNC: 14.7 G/DL (ref 13–17)
LENGTH OF FAST TIME PATIENT: 0 HRS
LYMPHOCYTES # BLD: 1.6 K/MCL (ref 1–4)
LYMPHOCYTES NFR BLD: 19 %
MCH RBC QN AUTO: 29.6 PG (ref 26–34)
MCHC RBC AUTO-ENTMCNC: 34.4 G/DL (ref 32–36.5)
MCV RBC AUTO: 85.9 FL (ref 78–100)
NEUTROPHILS # BLD: 6.3 K/MCL (ref 1.8–7.7)
NEUTROPHILS NFR BLD: 73 %
PLATELET # BLD: 164 K/MCL (ref 140–450)
POTASSIUM SERPL-SCNC: 4.3 MMOL/L (ref 3.4–5.1)
PROT SERPL-MCNC: 6.7 G/DL (ref 6.4–8.2)
RBC # BLD: 4.97 MIL/MCL (ref 4.5–5.9)
SODIUM SERPL-SCNC: 143 MMOL/L (ref 135–145)
WBC # BLD: 8.5 K/MCL (ref 4.2–11)

## 2019-09-19 ASSESSMENT — PAIN SCALES - GENERAL: PAINLEVEL_OUTOF10: 6

## 2019-09-24 ENCOUNTER — OFFICE VISIT (OUTPATIENT)
Dept: PAIN CLINIC | Facility: CLINIC | Age: 54
End: 2019-09-24
Payer: COMMERCIAL

## 2019-09-24 DIAGNOSIS — M54.59 INTRACTABLE LOW BACK PAIN: ICD-10-CM

## 2019-09-24 PROCEDURE — 97810 ACUP 1/> WO ESTIM 1ST 15 MIN: CPT | Performed by: ACUPUNCTURIST

## 2019-09-24 PROCEDURE — 97811 ACUP 1/> W/O ESTIM EA ADD 15: CPT | Performed by: ACUPUNCTURIST

## 2019-10-01 ENCOUNTER — OFFICE VISIT (OUTPATIENT)
Dept: PAIN CLINIC | Facility: CLINIC | Age: 54
End: 2019-10-01
Payer: COMMERCIAL

## 2019-10-01 DIAGNOSIS — R11.0 NAUSEA: ICD-10-CM

## 2019-10-01 DIAGNOSIS — M54.32 SCIATICA OF LEFT SIDE: ICD-10-CM

## 2019-10-01 PROCEDURE — 97810 ACUP 1/> WO ESTIM 1ST 15 MIN: CPT | Performed by: ACUPUNCTURIST

## 2019-10-01 PROCEDURE — 97811 ACUP 1/> W/O ESTIM EA ADD 15: CPT | Performed by: ACUPUNCTURIST

## 2019-10-08 ENCOUNTER — OFFICE VISIT (OUTPATIENT)
Dept: PAIN CLINIC | Facility: CLINIC | Age: 54
End: 2019-10-08
Payer: COMMERCIAL

## 2019-10-08 DIAGNOSIS — M54.32 SCIATICA OF LEFT SIDE: ICD-10-CM

## 2019-10-08 PROCEDURE — 97811 ACUP 1/> W/O ESTIM EA ADD 15: CPT | Performed by: ACUPUNCTURIST

## 2019-10-08 PROCEDURE — 97810 ACUP 1/> WO ESTIM 1ST 15 MIN: CPT | Performed by: ACUPUNCTURIST

## 2019-10-14 ENCOUNTER — TELEPHONE (OUTPATIENT)
Dept: SURGERY | Facility: CLINIC | Age: 54
End: 2019-10-14

## 2019-10-14 NOTE — TELEPHONE ENCOUNTER
Pt confirmed 10/15/19 acupuncture visit but expressed some concerns about insurance and referral.  I recommended he call his plan and ask if an \"insurance referral or prior auth\" is required for acupuncture. If they say no, he should be good.   They alre

## 2019-10-15 ENCOUNTER — OFFICE VISIT (OUTPATIENT)
Dept: PAIN CLINIC | Facility: CLINIC | Age: 54
End: 2019-10-15
Payer: COMMERCIAL

## 2019-10-15 DIAGNOSIS — M54.32 SCIATICA OF LEFT SIDE: ICD-10-CM

## 2019-10-15 PROCEDURE — 97810 ACUP 1/> WO ESTIM 1ST 15 MIN: CPT | Performed by: ACUPUNCTURIST

## 2019-10-15 PROCEDURE — 97811 ACUP 1/> W/O ESTIM EA ADD 15: CPT | Performed by: ACUPUNCTURIST

## 2019-10-23 ENCOUNTER — TELEPHONE (OUTPATIENT)
Dept: SCHEDULING | Age: 54
End: 2019-10-23

## 2019-10-24 ENCOUNTER — NURSE ONLY (OUTPATIENT)
Dept: GERIATRIC MEDICINE | Age: 54
End: 2019-10-24

## 2019-10-24 DIAGNOSIS — Z23 NEED FOR INFLUENZA VACCINATION: Primary | ICD-10-CM

## 2019-10-24 PROCEDURE — 90471 IMMUNIZATION ADMIN: CPT | Performed by: INTERNAL MEDICINE

## 2019-10-24 PROCEDURE — 90686 IIV4 VACC NO PRSV 0.5 ML IM: CPT

## 2019-10-30 ENCOUNTER — PRIOR ORIGINAL RECORDS (OUTPATIENT)
Dept: OTHER | Age: 54
End: 2019-10-30

## 2019-10-30 LAB
ALBUMIN SERPL-MCNC: 4.1 G/DL (ref 3.6–5.1)
ALBUMIN/GLOB SERPL: 1.5 {RATIO} (ref 1–2.4)
ALP SERPL-CCNC: 113 UNITS/L (ref 45–117)
ALT SERPL-CCNC: 24 UNITS/L
ANION GAP SERPL CALC-SCNC: 8 MMOL/L (ref 10–20)
AST SERPL-CCNC: 17 UNITS/L
BASO+EOS+MONOS # BLD: 0.7 K/MCL (ref 0.1–1.1)
BASO+EOS+MONOS NFR BLD: 9 %
BILIRUB SERPL-MCNC: 0.8 MG/DL (ref 0.2–1)
BUN SERPL-MCNC: 11 MG/DL (ref 6–20)
BUN/CREAT SERPL: 13 (ref 7–25)
CALCIUM SERPL-MCNC: 9.1 MG/DL (ref 8.4–10.2)
CHLORIDE SERPL-SCNC: 107 MMOL/L (ref 98–107)
CO2 SERPL-SCNC: 32 MMOL/L (ref 21–32)
CREAT SERPL-MCNC: 0.88 MG/DL (ref 0.67–1.17)
CREATININE CLEARANCE (EST): 113.61 ML/MIN
DIFFERENTIAL METHOD BLD: NORMAL
ERYTHROCYTE [DISTWIDTH] IN BLOOD: 13.7 % (ref 11–15)
GLOBULIN SER-MCNC: 2.8 G/DL (ref 2–4)
GLUCOSE SERPL-MCNC: 76 MG/DL (ref 65–99)
HCT VFR BLD CALC: 44.3 % (ref 39–51)
HGB BLD-MCNC: 15.2 G/DL (ref 13–17)
LENGTH OF FAST TIME PATIENT: 0 HRS
LYMPHOCYTES # BLD: 1.9 K/MCL (ref 1–4)
LYMPHOCYTES NFR BLD: 24 %
MCH RBC QN AUTO: 29.9 PG (ref 26–34)
MCHC RBC AUTO-ENTMCNC: 34.3 G/DL (ref 32–36.5)
MCV RBC AUTO: 87 FL (ref 78–100)
NEUTROPHILS # BLD: 5.1 K/MCL (ref 1.8–7.7)
NEUTROPHILS NFR BLD: 67 %
PLATELET # BLD: 163 K/MCL (ref 140–450)
POTASSIUM SERPL-SCNC: 3.9 MMOL/L (ref 3.4–5.1)
PROT SERPL-MCNC: 6.9 G/DL (ref 6.4–8.2)
RBC # BLD: 5.09 MIL/MCL (ref 4.5–5.9)
SODIUM SERPL-SCNC: 143 MMOL/L (ref 135–145)
WBC # BLD: 7.7 K/MCL (ref 4.2–11)

## 2019-10-30 RX ORDER — TAMSULOSIN HYDROCHLORIDE 0.4 MG/1
CAPSULE ORAL
Qty: 90 CAPSULE | Refills: 1 | Status: SHIPPED | OUTPATIENT
Start: 2019-10-30 | End: 2020-04-02 | Stop reason: SDUPTHER

## 2019-11-04 RX ORDER — GABAPENTIN 600 MG/1
TABLET ORAL
Qty: 90 TABLET | Refills: 3 | Status: SHIPPED | OUTPATIENT
Start: 2019-11-04 | End: 2020-06-22 | Stop reason: SDUPTHER

## 2019-11-27 ENCOUNTER — PRIOR ORIGINAL RECORDS (OUTPATIENT)
Dept: OTHER | Age: 54
End: 2019-11-27

## 2019-11-27 ASSESSMENT — PAIN SCALES - GENERAL: PAINLEVEL_OUTOF10: 6

## 2019-12-23 RX ORDER — MODAFINIL 200 MG/1
TABLET ORAL
Qty: 30 TABLET | Refills: 2 | Status: SHIPPED | OUTPATIENT
Start: 2019-12-23 | End: 2020-04-09

## 2020-01-01 ENCOUNTER — EXTERNAL RECORD (OUTPATIENT)
Dept: HEALTH INFORMATION MANAGEMENT | Facility: OTHER | Age: 55
End: 2020-01-01

## 2020-01-07 DIAGNOSIS — M51.36 BULGE OF LUMBAR DISC WITHOUT MYELOPATHY: ICD-10-CM

## 2020-01-07 RX ORDER — TRAMADOL HYDROCHLORIDE 50 MG/1
50 TABLET ORAL EVERY 6 HOURS PRN
Qty: 90 TABLET | Refills: 0 | Status: SHIPPED
Start: 2020-01-07 | End: 2020-03-11 | Stop reason: SDUPTHER

## 2020-01-14 NOTE — PROGRESS NOTES
Chintan Galarza is a 47year old male No chief complaint on file. .   Complaints:  1. Chronic low back pain 6-8 started after 1 year of receiving chemotherapy for leukemia pain is in the middle towards the left. 2. Fibormyalgia  5.  Constipation before but now more r

## 2020-01-29 ENCOUNTER — PRIOR ORIGINAL RECORDS (OUTPATIENT)
Dept: OTHER | Age: 55
End: 2020-01-29

## 2020-01-29 ENCOUNTER — HOSPITAL (OUTPATIENT)
Dept: OTHER | Age: 55
End: 2020-01-29

## 2020-01-29 LAB
ALBUMIN SERPL-MCNC: 3.9 G/DL (ref 3.6–5.1)
ALBUMIN/GLOB SERPL: 1.5 {RATIO} (ref 1–2.4)
ALP SERPL-CCNC: 104 UNITS/L (ref 45–117)
ALT SERPL-CCNC: 23 UNITS/L
ANION GAP SERPL CALC-SCNC: 8 MMOL/L (ref 10–20)
AST SERPL-CCNC: 14 UNITS/L
BASOPHILS # BLD: 0.1 K/MCL (ref 0–0.3)
BASOPHILS NFR BLD: 1 % (ref 0–2)
BILIRUB SERPL-MCNC: 1.1 MG/DL (ref 0.2–1)
BUN SERPL-MCNC: 6 MG/DL (ref 6–20)
BUN/CREAT SERPL: 7 (ref 7–25)
CALCIUM SERPL-MCNC: 8.9 MG/DL (ref 8.4–10.2)
CHLORIDE SERPL-SCNC: 106 MMOL/L (ref 98–107)
CO2 SERPL-SCNC: 31 MMOL/L (ref 21–32)
CREAT SERPL-MCNC: 0.83 MG/DL (ref 0.51–0.95)
CREATININE CLEARANCE (EST): 120.45 ML/MIN
DIFFERENTIAL METHOD BLD: ABNORMAL
DIFFERENTIAL METHOD BLD: NORMAL
EOSINOPHIL # BLD: 0.7 K/MCL (ref 0.1–0.5)
EOSINOPHIL NFR BLD: 9 % (ref 0–6)
ERYTHROCYTE [DISTWIDTH] IN BLOOD: 13.2 % (ref 11–15)
ERYTHROCYTE [DISTWIDTH] IN BLOOD: 13.7 % (ref 11–15)
GLOBULIN SER-MCNC: 2.6 G/DL (ref 2–4)
GLUCOSE SERPL-MCNC: 89 MG/DL (ref 65–99)
HCT VFR BLD CALC: 42 % (ref 36–46.5)
HCT VFR BLD CALC: 42.7 % (ref 36–46.5)
HGB BLD-MCNC: 14.6 G/DL (ref 12–15.5)
HGB BLD-MCNC: 14.9 G/DL (ref 12–15.5)
IMM GRANULOCYTES # BLD AUTO: 0 K/MCL (ref 0–0.2)
IMM GRANULOCYTES NFR BLD: 0 %
LYMPHOCYTES # BLD: 1.6 K/MCL (ref 1–4)
LYMPHOCYTES # BLD: 1.7 K/MCL (ref 1–4)
LYMPHOCYTES NFR BLD: 20 %
LYMPHOCYTES NFR BLD: 22 %
MCH RBC QN AUTO: 29.3 PG (ref 26–34)
MCH RBC QN AUTO: 29.9 PG (ref 26–34)
MCHC RBC AUTO-ENTMCNC: 34.2 G/DL (ref 32–36.5)
MCHC RBC AUTO-ENTMCNC: 35.5 G/DL (ref 32–36.5)
MCV RBC AUTO: 84.3 FL (ref 78–100)
MCV RBC AUTO: 85.6 FL (ref 78–100)
MONOCYTES # BLD: 0.6 K/MCL (ref 0.3–0.9)
MONOCYTES NFR BLD: 8 %
NEUTROPHILS # BLD: 4.9 K/MCL (ref 1.8–7.7)
NEUTROPHILS NFR BLD: 60 %
NRBC BLD MANUAL-RTO: 0 /100 WBC (ref 0–0)
PLATELET # BLD: 150 K/MCL (ref 140–450)
PLATELET # BLD: 165 K/MCL (ref 140–450)
POTASSIUM SERPL-SCNC: 3.9 MMOL/L (ref 3.4–5.1)
PROT SERPL-MCNC: 6.5 G/DL (ref 6.4–8.2)
RBC # BLD: 4.98 MIL/MCL (ref 4–5.2)
RBC # BLD: 4.99 MIL/MCL (ref 4–5.2)
SODIUM SERPL-SCNC: 141 MMOL/L (ref 135–145)
WBC # BLD: 8 K/MCL (ref 4.2–11)
WBC # BLD: 8 K/MCL (ref 4.2–11)

## 2020-01-29 PROCEDURE — 99213 OFFICE O/P EST LOW 20 MIN: CPT | Performed by: INTERNAL MEDICINE

## 2020-01-29 ASSESSMENT — PAIN SCALES - GENERAL: PAINLEVEL_OUTOF10: 7

## 2020-01-31 ENCOUNTER — PRIOR ORIGINAL RECORDS (OUTPATIENT)
Dept: OTHER | Age: 55
End: 2020-01-31

## 2020-01-31 ASSESSMENT — PAIN SCALES - GENERAL: PAINLEVEL_OUTOF10: 5

## 2020-02-01 ENCOUNTER — HOSPITAL (OUTPATIENT)
Dept: OTHER | Age: 55
End: 2020-02-01

## 2020-03-01 ENCOUNTER — HOSPITAL (OUTPATIENT)
Dept: OTHER | Age: 55
End: 2020-03-01

## 2020-03-11 DIAGNOSIS — M51.36 BULGE OF LUMBAR DISC WITHOUT MYELOPATHY: ICD-10-CM

## 2020-03-11 RX ORDER — TRAMADOL HYDROCHLORIDE 50 MG/1
50 TABLET ORAL EVERY 6 HOURS PRN
Qty: 90 TABLET | Refills: 0 | Status: SHIPPED
Start: 2020-03-11 | End: 2020-05-21 | Stop reason: SDUPTHER

## 2020-03-23 ENCOUNTER — TELEPHONE (OUTPATIENT)
Dept: SCHEDULING | Age: 55
End: 2020-03-23

## 2020-03-23 DIAGNOSIS — J45.20 MILD INTERMITTENT ASTHMA WITHOUT COMPLICATION: ICD-10-CM

## 2020-03-23 RX ORDER — ALBUTEROL SULFATE 90 UG/1
2 AEROSOL, METERED RESPIRATORY (INHALATION) EVERY 4 HOURS PRN
Qty: 1 INHALER | Refills: 1 | Status: SHIPPED | OUTPATIENT
Start: 2020-03-23 | End: 2021-03-20 | Stop reason: SDUPTHER

## 2020-03-27 ENCOUNTER — TELEPHONE (OUTPATIENT)
Dept: GERIATRIC MEDICINE | Age: 55
End: 2020-03-27

## 2020-03-27 ENCOUNTER — TELEPHONE (OUTPATIENT)
Dept: SCHEDULING | Age: 55
End: 2020-03-27

## 2020-03-27 RX ORDER — ALBUTEROL SULFATE 2.5 MG/3ML
2.5 SOLUTION RESPIRATORY (INHALATION) EVERY 6 HOURS PRN
Qty: 90 ML | Refills: 5 | Status: SHIPPED | OUTPATIENT
Start: 2020-03-27 | End: 2022-04-27 | Stop reason: ALTCHOICE

## 2020-04-01 ENCOUNTER — HOSPITAL (OUTPATIENT)
Dept: OTHER | Age: 55
End: 2020-04-01

## 2020-04-02 ENCOUNTER — TELEPHONE (OUTPATIENT)
Dept: GERIATRIC MEDICINE | Age: 55
End: 2020-04-02

## 2020-04-02 RX ORDER — TAMSULOSIN HYDROCHLORIDE 0.4 MG/1
0.4 CAPSULE ORAL DAILY
Qty: 90 CAPSULE | Refills: 3 | Status: SHIPPED | OUTPATIENT
Start: 2020-04-02 | End: 2021-04-14

## 2020-04-09 RX ORDER — MODAFINIL 200 MG/1
TABLET ORAL
Qty: 30 TABLET | Refills: 2 | Status: SHIPPED | OUTPATIENT
Start: 2020-04-09 | End: 2020-07-21

## 2020-04-29 ENCOUNTER — PRIOR ORIGINAL RECORDS (OUTPATIENT)
Dept: OTHER | Age: 55
End: 2020-04-29

## 2020-04-29 LAB
ALBUMIN SERPL-MCNC: 4 G/DL (ref 3.6–5.1)
ALBUMIN/GLOB SERPL: 1.4 {RATIO} (ref 1–2.4)
ALP SERPL-CCNC: 112 UNITS/L (ref 45–117)
ALT SERPL-CCNC: 28 UNITS/L
ANION GAP SERPL CALC-SCNC: 11 MMOL/L (ref 10–20)
AST SERPL-CCNC: 22 UNITS/L
BASOPHILS # BLD: 0.1 K/MCL (ref 0–0.3)
BASOPHILS NFR BLD: 1 % (ref 0–2)
BILIRUB SERPL-MCNC: 1.3 MG/DL (ref 0.2–1)
BUN SERPL-MCNC: 9 MG/DL (ref 6–20)
BUN/CREAT SERPL: 11 (ref 7–25)
CALCIUM SERPL-MCNC: 9 MG/DL (ref 8.4–10.2)
CHLORIDE SERPL-SCNC: 107 MMOL/L (ref 98–107)
CO2 SERPL-SCNC: 30 MMOL/L (ref 21–32)
CREAT SERPL-MCNC: 0.81 MG/DL (ref 0.51–0.95)
CREATININE CLEARANCE (EST): 121.99 ML/MIN
DIFFERENTIAL METHOD BLD: ABNORMAL
DIFFERENTIAL METHOD BLD: NORMAL
EOSINOPHIL # BLD: 0.7 K/MCL (ref 0.1–0.5)
EOSINOPHIL NFR BLD: 9 % (ref 0–6)
ERYTHROCYTE [DISTWIDTH] IN BLOOD: 13.1 % (ref 11–15)
ERYTHROCYTE [DISTWIDTH] IN BLOOD: 13.8 % (ref 11–15)
GLOBULIN SER-MCNC: 2.9 G/DL (ref 2–4)
GLUCOSE SERPL-MCNC: 99 MG/DL (ref 65–99)
HCT VFR BLD CALC: 43.7 % (ref 36–46.5)
HCT VFR BLD CALC: 45.9 % (ref 36–46.5)
HGB BLD-MCNC: 15.3 G/DL (ref 12–15.5)
HGB BLD-MCNC: 15.4 G/DL (ref 12–15.5)
IMM GRANULOCYTES # BLD AUTO: 0 K/MCL (ref 0–0.2)
IMM GRANULOCYTES NFR BLD: 0 %
LENGTH OF FAST TIME PATIENT: 0 HRS
LYMPHOCYTES # BLD: 2 K/MCL (ref 1–4)
LYMPHOCYTES # BLD: 2.1 K/MCL (ref 1–4)
LYMPHOCYTES NFR BLD: 25 %
LYMPHOCYTES NFR BLD: 25 %
MCH RBC QN AUTO: 29.3 PG (ref 26–34)
MCH RBC QN AUTO: 29.7 PG (ref 26–34)
MCHC RBC AUTO-ENTMCNC: 33.3 G/DL (ref 32–36.5)
MCHC RBC AUTO-ENTMCNC: 35.2 G/DL (ref 32–36.5)
MCV RBC AUTO: 84.4 FL (ref 78–100)
MCV RBC AUTO: 87.9 FL (ref 78–100)
MONOCYTES # BLD: 0.6 K/MCL (ref 0.3–0.9)
MONOCYTES NFR BLD: 8 %
NEUTROPHILS # BLD: 4.8 K/MCL (ref 1.8–7.7)
NEUTROPHILS NFR BLD: 57 %
NRBC BLD MANUAL-RTO: 0 /100 WBC (ref 0–0)
PLATELET # BLD: 167 K/MCL (ref 140–450)
PLATELET # BLD: 175 K/MCL (ref 140–450)
POTASSIUM SERPL-SCNC: 3.7 MMOL/L (ref 3.4–5.1)
PROT SERPL-MCNC: 6.9 G/DL (ref 6.4–8.2)
RBC # BLD: 5.18 MIL/MCL (ref 4–5.2)
RBC # BLD: 5.22 MIL/MCL (ref 4–5.2)
SODIUM SERPL-SCNC: 144 MMOL/L (ref 135–145)
WBC # BLD: 8.1 K/MCL (ref 4.2–11)
WBC # BLD: 8.3 K/MCL (ref 4.2–11)

## 2020-04-29 PROCEDURE — 99213 OFFICE O/P EST LOW 20 MIN: CPT | Performed by: INTERNAL MEDICINE

## 2020-04-29 ASSESSMENT — PAIN SCALES - GENERAL
PAINLEVEL_OUTOF10: 7
PAINLEVEL_OUTOF10: 7

## 2020-05-01 ENCOUNTER — HOSPITAL (OUTPATIENT)
Dept: OTHER | Age: 55
End: 2020-05-01

## 2020-05-21 DIAGNOSIS — M51.36 BULGE OF LUMBAR DISC WITHOUT MYELOPATHY: ICD-10-CM

## 2020-05-21 RX ORDER — TRAMADOL HYDROCHLORIDE 50 MG/1
50 TABLET ORAL EVERY 6 HOURS PRN
Qty: 90 TABLET | Refills: 0 | Status: SHIPPED | OUTPATIENT
Start: 2020-05-21 | End: 2020-07-06 | Stop reason: SDUPTHER

## 2020-06-01 ENCOUNTER — HOSPITAL (OUTPATIENT)
Dept: OTHER | Age: 55
End: 2020-06-01

## 2020-06-08 ENCOUNTER — TELEPHONE (OUTPATIENT)
Dept: SCHEDULING | Age: 55
End: 2020-06-08

## 2020-06-08 DIAGNOSIS — M48.061 SPINAL STENOSIS OF LUMBAR REGION, UNSPECIFIED WHETHER NEUROGENIC CLAUDICATION PRESENT: Primary | ICD-10-CM

## 2020-06-08 DIAGNOSIS — M54.50 BILATERAL LOW BACK PAIN WITHOUT SCIATICA, UNSPECIFIED CHRONICITY: ICD-10-CM

## 2020-06-22 ENCOUNTER — TELEPHONE (OUTPATIENT)
Dept: GERIATRIC MEDICINE | Age: 55
End: 2020-06-22

## 2020-06-22 RX ORDER — GABAPENTIN 600 MG/1
600 TABLET ORAL DAILY
Qty: 90 TABLET | Refills: 0 | Status: SHIPPED | OUTPATIENT
Start: 2020-06-22 | End: 2021-02-18 | Stop reason: SDUPTHER

## 2020-06-23 ENCOUNTER — DOCUMENTATION (OUTPATIENT)
Dept: GERIATRIC MEDICINE | Age: 55
End: 2020-06-23

## 2020-06-23 PROBLEM — C92.00 ACUTE MYELOID LEUKEMIA (CMD): Status: ACTIVE | Noted: 2020-06-23

## 2020-06-23 PROBLEM — K80.20 GALLSTONES: Status: ACTIVE | Noted: 2020-06-23

## 2020-06-23 PROBLEM — M51.26 HERNIATED LUMBAR INTERVERTEBRAL DISC: Status: ACTIVE | Noted: 2020-06-23

## 2020-06-23 PROBLEM — K21.9 ESOPHAGEAL REFLUX: Status: ACTIVE | Noted: 2020-06-23

## 2020-07-01 ENCOUNTER — HOSPITAL (OUTPATIENT)
Dept: OTHER | Age: 55
End: 2020-07-01
Attending: INTERNAL MEDICINE

## 2020-07-06 ENCOUNTER — LAB SERVICES (OUTPATIENT)
Dept: LAB | Age: 55
End: 2020-07-06

## 2020-07-06 ENCOUNTER — E-ADVICE (OUTPATIENT)
Dept: ENDOCRINOLOGY | Age: 55
End: 2020-07-06

## 2020-07-06 ENCOUNTER — DOCUMENTATION (OUTPATIENT)
Dept: GERIATRIC MEDICINE | Age: 55
End: 2020-07-06

## 2020-07-06 ENCOUNTER — OFFICE VISIT (OUTPATIENT)
Dept: RHEUMATOLOGY | Age: 55
End: 2020-07-06

## 2020-07-06 DIAGNOSIS — M79.7 FIBROMYALGIA: Primary | ICD-10-CM

## 2020-07-06 DIAGNOSIS — M51.36 BULGE OF LUMBAR DISC WITHOUT MYELOPATHY: ICD-10-CM

## 2020-07-06 DIAGNOSIS — M54.32 SCIATICA OF LEFT SIDE: ICD-10-CM

## 2020-07-06 PROBLEM — M51.369 BULGE OF LUMBAR DISC WITHOUT MYELOPATHY: Status: ACTIVE | Noted: 2020-06-23

## 2020-07-06 PROCEDURE — 99214 OFFICE O/P EST MOD 30 MIN: CPT | Performed by: INTERNAL MEDICINE

## 2020-07-06 PROCEDURE — 80307 DRUG TEST PRSMV CHEM ANLYZR: CPT | Performed by: INTERNAL MEDICINE

## 2020-07-06 RX ORDER — DULOXETIN HYDROCHLORIDE 30 MG/1
30 CAPSULE, DELAYED RELEASE ORAL DAILY
Qty: 30 CAPSULE | Refills: 0 | Status: SHIPPED | OUTPATIENT
Start: 2020-07-06 | End: 2020-07-30

## 2020-07-06 RX ORDER — TRAMADOL HYDROCHLORIDE 50 MG/1
50 TABLET ORAL AT BEDTIME
Qty: 90 TABLET | Refills: 0 | Status: SHIPPED | OUTPATIENT
Start: 2020-07-06 | End: 2020-09-09 | Stop reason: SDUPTHER

## 2020-07-06 ASSESSMENT — ENCOUNTER SYMPTOMS
EYE PAIN: 0
DIARRHEA: 0
ADENOPATHY: 0
VOMITING: 0
NERVOUS/ANXIOUS: 0
AGITATION: 0
ABDOMINAL PAIN: 0
COLOR CHANGE: 0
NAUSEA: 0
BACK PAIN: 1
EYE REDNESS: 0
UNEXPECTED WEIGHT CHANGE: 0
COUGH: 0
WEAKNESS: 0
POLYDIPSIA: 0
BLOOD IN STOOL: 0
NUMBNESS: 1
CHILLS: 0
SORE THROAT: 0
FEVER: 0
SHORTNESS OF BREATH: 0

## 2020-07-06 ASSESSMENT — PAIN SCALES - GENERAL: PAINLEVEL: 7-8

## 2020-07-17 LAB
DRUG MONITORING REPORT (QDMRPT): NORMAL
SERVICE CMNT-IMP: NORMAL

## 2020-07-21 RX ORDER — MODAFINIL 200 MG/1
TABLET ORAL
Qty: 30 TABLET | Refills: 2 | Status: SHIPPED | OUTPATIENT
Start: 2020-07-21 | End: 2020-12-15 | Stop reason: SDUPTHER

## 2020-07-22 ENCOUNTER — DOCUMENTATION (OUTPATIENT)
Dept: GERIATRIC MEDICINE | Age: 55
End: 2020-07-22

## 2020-07-28 ENCOUNTER — PRIOR ORIGINAL RECORDS (OUTPATIENT)
Dept: OTHER | Age: 55
End: 2020-07-28

## 2020-07-28 LAB
ALBUMIN SERPL-MCNC: 4 G/DL (ref 3.6–5.1)
ALBUMIN/GLOB SERPL: 1.3 {RATIO} (ref 1–2.4)
ALP SERPL-CCNC: 115 UNITS/L (ref 45–117)
ALT SERPL-CCNC: 37 UNITS/L
ANION GAP SERPL CALC-SCNC: 8 MMOL/L (ref 10–20)
AST SERPL-CCNC: 21 UNITS/L
BASO+EOS+MONOS # BLD: 0.7 K/MCL (ref 0.1–1.1)
BASO+EOS+MONOS NFR BLD: 8 %
BILIRUB SERPL-MCNC: 0.8 MG/DL (ref 0.2–1)
BUN SERPL-MCNC: 9 MG/DL (ref 6–20)
BUN/CREAT SERPL: 11 (ref 7–25)
CALCIUM SERPL-MCNC: 8.9 MG/DL (ref 8.4–10.2)
CHLORIDE SERPL-SCNC: 107 MMOL/L (ref 98–107)
CO2 SERPL-SCNC: 31 MMOL/L (ref 21–32)
CREAT SERPL-MCNC: 0.81 MG/DL (ref 0.67–1.17)
CREATININE CLEARANCE (EST): 121.99 ML/MIN
DIFFERENTIAL METHOD BLD: NORMAL
ERYTHROCYTE [DISTWIDTH] IN BLOOD: 13.7 % (ref 11–15)
GLOBULIN SER-MCNC: 3.1 G/DL (ref 2–4)
GLUCOSE SERPL-MCNC: 105 MG/DL (ref 65–99)
HCT VFR BLD CALC: 46.1 % (ref 39–51)
HGB BLD-MCNC: 16.2 G/DL (ref 13–17)
LENGTH OF FAST TIME PATIENT: 0 HRS
LYMPHOCYTES # BLD: 1.6 K/MCL (ref 1–4)
LYMPHOCYTES NFR BLD: 21 %
MCH RBC QN AUTO: 29.7 PG (ref 26–34)
MCHC RBC AUTO-ENTMCNC: 35.1 G/DL (ref 32–36.5)
MCV RBC AUTO: 84.4 FL (ref 78–100)
NEUTROPHILS # BLD: 5.7 K/MCL (ref 1.8–7.7)
NEUTROPHILS NFR BLD: 71 %
PLATELET # BLD: 159 K/MCL (ref 140–450)
POTASSIUM SERPL-SCNC: 3.7 MMOL/L (ref 3.4–5.1)
PROT SERPL-MCNC: 7.1 G/DL (ref 6.4–8.2)
RBC # BLD: 5.46 MIL/MCL (ref 4.5–5.9)
SODIUM SERPL-SCNC: 142 MMOL/L (ref 135–145)
WBC # BLD: 8 K/MCL (ref 4.2–11)

## 2020-07-28 PROCEDURE — 99213 OFFICE O/P EST LOW 20 MIN: CPT | Performed by: INTERNAL MEDICINE

## 2020-07-28 ASSESSMENT — PAIN SCALES - GENERAL: PAINLEVEL_OUTOF10: 9

## 2020-07-30 ENCOUNTER — TELEPHONE (OUTPATIENT)
Dept: RHEUMATOLOGY | Age: 55
End: 2020-07-30

## 2020-07-30 ENCOUNTER — TELEPHONE (OUTPATIENT)
Dept: SCHEDULING | Age: 55
End: 2020-07-30

## 2020-07-30 RX ORDER — DULOXETIN HYDROCHLORIDE 30 MG/1
CAPSULE, DELAYED RELEASE ORAL
Qty: 30 CAPSULE | Refills: 0 | Status: SHIPPED | OUTPATIENT
Start: 2020-07-30 | End: 2020-10-13 | Stop reason: DRUGHIGH

## 2020-08-01 ENCOUNTER — HOSPITAL (OUTPATIENT)
Dept: OTHER | Age: 55
End: 2020-08-01
Attending: INTERNAL MEDICINE

## 2020-08-14 ENCOUNTER — HOSPITAL ENCOUNTER (EMERGENCY)
Age: 55
Discharge: HOME | End: 2020-08-14
Attending: EMERGENCY MEDICINE

## 2020-08-14 ENCOUNTER — APPOINTMENT (OUTPATIENT)
Dept: CT IMAGING | Age: 55
End: 2020-08-14
Attending: EMERGENCY MEDICINE

## 2020-08-14 ENCOUNTER — DOCUMENTATION (OUTPATIENT)
Dept: GERIATRIC MEDICINE | Age: 55
End: 2020-08-14

## 2020-08-14 VITALS
WEIGHT: 183.86 LBS | TEMPERATURE: 98.9 F | RESPIRATION RATE: 18 BRPM | OXYGEN SATURATION: 98 % | DIASTOLIC BLOOD PRESSURE: 91 MMHG | HEART RATE: 97 BPM | SYSTOLIC BLOOD PRESSURE: 144 MMHG | BODY MASS INDEX: 30.6 KG/M2

## 2020-08-14 DIAGNOSIS — N20.0 KIDNEY STONE: Primary | ICD-10-CM

## 2020-08-14 LAB
AMORPH SED URNS QL MICRO: PRESENT
ANION GAP SERPL CALC-SCNC: 8 MMOL/L (ref 10–20)
APPEARANCE UR: CLEAR
BACTERIA #/AREA URNS HPF: ABNORMAL /HPF
BASOPHILS # BLD: 0.1 K/MCL (ref 0–0.3)
BASOPHILS NFR BLD: 1 %
BILIRUB UR QL STRIP: NEGATIVE
BUN SERPL-MCNC: 8 MG/DL (ref 6–20)
BUN/CREAT SERPL: 9 (ref 7–25)
CALCIUM SERPL-MCNC: 8.8 MG/DL (ref 8.4–10.2)
CHLORIDE SERPL-SCNC: 106 MMOL/L (ref 98–107)
CO2 SERPL-SCNC: 28 MMOL/L (ref 21–32)
COLOR UR: YELLOW
CREAT SERPL-MCNC: 0.86 MG/DL (ref 0.67–1.17)
DIFFERENTIAL METHOD BLD: ABNORMAL
EOSINOPHIL # BLD: 0.6 K/MCL (ref 0.1–0.5)
EOSINOPHIL NFR BLD: 6 %
ERYTHROCYTE [DISTWIDTH] IN BLOOD: 12.9 % (ref 11–15)
GLUCOSE SERPL-MCNC: 98 MG/DL (ref 65–99)
GLUCOSE UR STRIP-MCNC: NEGATIVE MG/DL
HCT VFR BLD CALC: 45.7 % (ref 39–51)
HGB BLD-MCNC: 15.5 G/DL (ref 13–17)
HGB UR QL STRIP: ABNORMAL
HYALINE CASTS #/AREA URNS LPF: ABNORMAL /LPF (ref 0–5)
IMM GRANULOCYTES # BLD AUTO: 0 K/MCL (ref 0–0.2)
IMM GRANULOCYTES NFR BLD: 0 %
KETONES UR STRIP-MCNC: ABNORMAL MG/DL
LEUKOCYTE ESTERASE UR QL STRIP: NEGATIVE
LYMPHOCYTES # BLD: 2 K/MCL (ref 1–4)
LYMPHOCYTES NFR BLD: 20 %
MCH RBC QN AUTO: 29 PG (ref 26–34)
MCHC RBC AUTO-ENTMCNC: 33.9 G/DL (ref 32–36.5)
MCV RBC AUTO: 85.6 FL (ref 78–100)
MONOCYTES # BLD: 0.9 K/MCL (ref 0.3–0.9)
MONOCYTES NFR BLD: 9 %
MUCOUS THREADS URNS QL MICRO: PRESENT
NEUTROPHILS # BLD: 6.1 K/MCL (ref 1.8–7.7)
NEUTROPHILS NFR BLD: 64 %
NITRITE UR QL STRIP: NEGATIVE
NRBC BLD MANUAL-RTO: 0 /100 WBC
PH UR STRIP: 7 UNITS (ref 5–7)
PLATELET # BLD: 183 K/MCL (ref 140–450)
POTASSIUM SERPL-SCNC: 3.5 MMOL/L (ref 3.4–5.1)
PROT UR STRIP-MCNC: NEGATIVE MG/DL
RBC # BLD: 5.34 MIL/MCL (ref 4.5–5.9)
RBC #/AREA URNS HPF: ABNORMAL /HPF (ref 0–2)
SODIUM SERPL-SCNC: 138 MMOL/L (ref 135–145)
SP GR UR STRIP: <1.005 (ref 1–1.03)
SPECIMEN SOURCE: ABNORMAL
SQUAMOUS #/AREA URNS HPF: ABNORMAL /HPF (ref 0–5)
UROBILINOGEN UR STRIP-MCNC: 0.2 MG/DL (ref 0–1)
WBC # BLD: 9.6 K/MCL (ref 4.2–11)
WBC #/AREA URNS HPF: ABNORMAL /HPF (ref 0–5)

## 2020-08-14 PROCEDURE — 80048 BASIC METABOLIC PNL TOTAL CA: CPT

## 2020-08-14 PROCEDURE — 74176 CT ABD & PELVIS W/O CONTRAST: CPT

## 2020-08-14 PROCEDURE — 81001 URINALYSIS AUTO W/SCOPE: CPT

## 2020-08-14 PROCEDURE — 10002803 HB RX 637: Performed by: EMERGENCY MEDICINE

## 2020-08-14 PROCEDURE — 96375 TX/PRO/DX INJ NEW DRUG ADDON: CPT

## 2020-08-14 PROCEDURE — 96376 TX/PRO/DX INJ SAME DRUG ADON: CPT

## 2020-08-14 PROCEDURE — 99284 EMERGENCY DEPT VISIT MOD MDM: CPT

## 2020-08-14 PROCEDURE — 85025 COMPLETE CBC W/AUTO DIFF WBC: CPT

## 2020-08-14 PROCEDURE — 10002800 HB RX 250 W HCPCS: Performed by: EMERGENCY MEDICINE

## 2020-08-14 PROCEDURE — 96374 THER/PROPH/DIAG INJ IV PUSH: CPT

## 2020-08-14 RX ORDER — HYDROCODONE BITARTRATE AND ACETAMINOPHEN 5; 325 MG/1; MG/1
1 TABLET ORAL ONCE
Status: COMPLETED | OUTPATIENT
Start: 2020-08-14 | End: 2020-08-14

## 2020-08-14 RX ORDER — ONDANSETRON 2 MG/ML
4 INJECTION INTRAMUSCULAR; INTRAVENOUS ONCE
Status: COMPLETED | OUTPATIENT
Start: 2020-08-14 | End: 2020-08-14

## 2020-08-14 RX ORDER — HYDROCODONE BITARTRATE AND ACETAMINOPHEN 5; 325 MG/1; MG/1
1 TABLET ORAL EVERY 6 HOURS PRN
Qty: 12 TABLET | Refills: 0 | Status: SHIPPED | OUTPATIENT
Start: 2020-08-14 | End: 2020-08-17

## 2020-08-14 RX ORDER — ONDANSETRON 4 MG/1
4 TABLET, ORALLY DISINTEGRATING ORAL EVERY 6 HOURS PRN
Qty: 12 TABLET | Refills: 0 | Status: SHIPPED | OUTPATIENT
Start: 2020-08-14 | End: 2020-08-17

## 2020-08-14 RX ADMIN — HYDROCODONE BITARTRATE AND ACETAMINOPHEN 1 TABLET: 5; 325 TABLET ORAL at 18:44

## 2020-08-14 RX ADMIN — ONDANSETRON 4 MG: 2 INJECTION INTRAMUSCULAR; INTRAVENOUS at 18:45

## 2020-08-14 RX ADMIN — KETOROLAC TROMETHAMINE 15 MG: 15 INJECTION, SOLUTION INTRAMUSCULAR; INTRAVENOUS at 14:22

## 2020-08-14 RX ADMIN — KETOROLAC TROMETHAMINE 15 MG: 15 INJECTION, SOLUTION INTRAMUSCULAR; INTRAVENOUS at 15:30

## 2020-08-14 ASSESSMENT — ENCOUNTER SYMPTOMS
WHEEZING: 0
VOMITING: 1
ABDOMINAL PAIN: 1
HALLUCINATIONS: 0
DIZZINESS: 0
WEAKNESS: 0
CONFUSION: 0
CHILLS: 0
SHORTNESS OF BREATH: 0
HEADACHES: 0
FEVER: 0
NUMBNESS: 0
NAUSEA: 1
SORE THROAT: 0
DIARRHEA: 0
EYE PAIN: 0
COUGH: 0

## 2020-08-14 ASSESSMENT — PAIN SCALES - GENERAL
PAINLEVEL_OUTOF10: 2
PAINLEVEL_OUTOF10: 10
PAINLEVEL_OUTOF10: 5
PAINLEVEL_OUTOF10: 7

## 2020-08-28 ENCOUNTER — APPOINTMENT (OUTPATIENT)
Dept: GENERAL RADIOLOGY | Facility: HOSPITAL | Age: 55
End: 2020-08-28
Attending: STUDENT IN AN ORGANIZED HEALTH CARE EDUCATION/TRAINING PROGRAM
Payer: COMMERCIAL

## 2020-08-28 ENCOUNTER — HOSPITAL ENCOUNTER (EMERGENCY)
Facility: HOSPITAL | Age: 55
Discharge: HOME | End: 2020-08-28
Attending: STUDENT IN AN ORGANIZED HEALTH CARE EDUCATION/TRAINING PROGRAM
Payer: COMMERCIAL

## 2020-08-28 VITALS
TEMPERATURE: 98 F | HEIGHT: 66 IN | WEIGHT: 178 LBS | BODY MASS INDEX: 28.61 KG/M2 | HEART RATE: 79 BPM | OXYGEN SATURATION: 99 % | DIASTOLIC BLOOD PRESSURE: 88 MMHG | RESPIRATION RATE: 15 BRPM | SYSTOLIC BLOOD PRESSURE: 137 MMHG

## 2020-08-28 DIAGNOSIS — J20.9 ACUTE BRONCHITIS, UNSPECIFIED ORGANISM: ICD-10-CM

## 2020-08-28 DIAGNOSIS — J45.901 ASTHMA WITH ACUTE EXACERBATION, UNSPECIFIED ASTHMA SEVERITY, UNSPECIFIED WHETHER PERSISTENT: Primary | ICD-10-CM

## 2020-08-28 LAB
ALBUMIN SERPL-MCNC: 4 G/DL (ref 3.4–5)
ALBUMIN/GLOB SERPL: 1.2 {RATIO} (ref 1–2)
ALP LIVER SERPL-CCNC: 114 U/L (ref 45–117)
ALT SERPL-CCNC: 28 U/L (ref 16–61)
ANION GAP SERPL CALC-SCNC: 4 MMOL/L (ref 0–18)
APTT PPP: 29.4 SECONDS (ref 25.4–36.1)
AST SERPL-CCNC: 19 U/L (ref 15–37)
ATRIAL RATE: 97 BPM
BASOPHILS # BLD AUTO: 0.11 X10(3) UL (ref 0–0.2)
BASOPHILS NFR BLD AUTO: 1 %
BILIRUB SERPL-MCNC: 0.9 MG/DL (ref 0.1–2)
BUN BLD-MCNC: 8 MG/DL (ref 7–18)
BUN/CREAT SERPL: 8.3 (ref 10–20)
CALCIUM BLD-MCNC: 9.2 MG/DL (ref 8.5–10.1)
CHLORIDE SERPL-SCNC: 107 MMOL/L (ref 98–112)
CO2 SERPL-SCNC: 27 MMOL/L (ref 21–32)
CREAT BLD-MCNC: 0.96 MG/DL (ref 0.7–1.3)
DEPRECATED RDW RBC AUTO: 39.2 FL (ref 35.1–46.3)
EOSINOPHIL # BLD AUTO: 1.02 X10(3) UL (ref 0–0.7)
EOSINOPHIL NFR BLD AUTO: 9.1 %
ERYTHROCYTE [DISTWIDTH] IN BLOOD BY AUTOMATED COUNT: 12.8 % (ref 11–15)
GLOBULIN PLAS-MCNC: 3.4 G/DL (ref 2.8–4.4)
GLUCOSE BLD-MCNC: 116 MG/DL (ref 70–99)
HCT VFR BLD AUTO: 46.9 % (ref 39–53)
HGB BLD-MCNC: 16.3 G/DL (ref 13–17.5)
IMM GRANULOCYTES # BLD AUTO: 0.02 X10(3) UL (ref 0–1)
IMM GRANULOCYTES NFR BLD: 0.2 %
INR BLD: 0.92 (ref 0.89–1.11)
LACTATE SERPL-SCNC: 1.9 MMOL/L (ref 0.4–2)
LYMPHOCYTES # BLD AUTO: 2.82 X10(3) UL (ref 1–4)
LYMPHOCYTES NFR BLD AUTO: 25.1 %
M PROTEIN MFR SERPL ELPH: 7.4 G/DL (ref 6.4–8.2)
MCH RBC QN AUTO: 29.4 PG (ref 26–34)
MCHC RBC AUTO-ENTMCNC: 34.8 G/DL (ref 31–37)
MCV RBC AUTO: 84.5 FL (ref 80–100)
MONOCYTES # BLD AUTO: 0.87 X10(3) UL (ref 0.1–1)
MONOCYTES NFR BLD AUTO: 7.7 %
NEUTROPHILS # BLD AUTO: 6.41 X10 (3) UL (ref 1.5–7.7)
NEUTROPHILS # BLD AUTO: 6.41 X10(3) UL (ref 1.5–7.7)
NEUTROPHILS NFR BLD AUTO: 56.9 %
NT-PROBNP SERPL-MCNC: 46 PG/ML (ref ?–125)
OSMOLALITY SERPL CALC.SUM OF ELEC: 285 MOSM/KG (ref 275–295)
P AXIS: 72 DEGREES
P-R INTERVAL: 144 MS
PLATELET # BLD AUTO: 185 10(3)UL (ref 150–450)
POTASSIUM SERPL-SCNC: 3.7 MMOL/L (ref 3.5–5.1)
PROCALCITONIN SERPL-MCNC: <0.05 NG/ML (ref ?–0.16)
PSA SERPL DL<=0.01 NG/ML-MCNC: 12.7 SECONDS (ref 12.4–14.6)
Q-T INTERVAL: 336 MS
QRS DURATION: 86 MS
QTC CALCULATION (BEZET): 426 MS
R AXIS: 7 DEGREES
RBC # BLD AUTO: 5.55 X10(6)UL (ref 4.3–5.7)
SARS-COV-2 RNA RESP QL NAA+PROBE: NOT DETECTED
SODIUM SERPL-SCNC: 138 MMOL/L (ref 136–145)
T AXIS: 55 DEGREES
TROPONIN I SERPL-MCNC: <0.045 NG/ML (ref ?–0.04)
VENTRICULAR RATE: 97 BPM
WBC # BLD AUTO: 11.3 X10(3) UL (ref 4–11)

## 2020-08-28 PROCEDURE — 85025 COMPLETE CBC W/AUTO DIFF WBC: CPT | Performed by: STUDENT IN AN ORGANIZED HEALTH CARE EDUCATION/TRAINING PROGRAM

## 2020-08-28 PROCEDURE — 84145 PROCALCITONIN (PCT): CPT | Performed by: STUDENT IN AN ORGANIZED HEALTH CARE EDUCATION/TRAINING PROGRAM

## 2020-08-28 PROCEDURE — 87040 BLOOD CULTURE FOR BACTERIA: CPT | Performed by: STUDENT IN AN ORGANIZED HEALTH CARE EDUCATION/TRAINING PROGRAM

## 2020-08-28 PROCEDURE — 93005 ELECTROCARDIOGRAM TRACING: CPT

## 2020-08-28 PROCEDURE — 80053 COMPREHEN METABOLIC PANEL: CPT | Performed by: STUDENT IN AN ORGANIZED HEALTH CARE EDUCATION/TRAINING PROGRAM

## 2020-08-28 PROCEDURE — 99285 EMERGENCY DEPT VISIT HI MDM: CPT

## 2020-08-28 PROCEDURE — 93010 ELECTROCARDIOGRAM REPORT: CPT

## 2020-08-28 PROCEDURE — 94640 AIRWAY INHALATION TREATMENT: CPT

## 2020-08-28 PROCEDURE — 85730 THROMBOPLASTIN TIME PARTIAL: CPT | Performed by: STUDENT IN AN ORGANIZED HEALTH CARE EDUCATION/TRAINING PROGRAM

## 2020-08-28 PROCEDURE — 94644 CONT INHLJ TX 1ST HOUR: CPT

## 2020-08-28 PROCEDURE — 99284 EMERGENCY DEPT VISIT MOD MDM: CPT

## 2020-08-28 PROCEDURE — 71045 X-RAY EXAM CHEST 1 VIEW: CPT | Performed by: STUDENT IN AN ORGANIZED HEALTH CARE EDUCATION/TRAINING PROGRAM

## 2020-08-28 PROCEDURE — 84484 ASSAY OF TROPONIN QUANT: CPT | Performed by: STUDENT IN AN ORGANIZED HEALTH CARE EDUCATION/TRAINING PROGRAM

## 2020-08-28 PROCEDURE — 96374 THER/PROPH/DIAG INJ IV PUSH: CPT

## 2020-08-28 PROCEDURE — 83880 ASSAY OF NATRIURETIC PEPTIDE: CPT | Performed by: STUDENT IN AN ORGANIZED HEALTH CARE EDUCATION/TRAINING PROGRAM

## 2020-08-28 PROCEDURE — 85610 PROTHROMBIN TIME: CPT | Performed by: STUDENT IN AN ORGANIZED HEALTH CARE EDUCATION/TRAINING PROGRAM

## 2020-08-28 PROCEDURE — 83605 ASSAY OF LACTIC ACID: CPT | Performed by: STUDENT IN AN ORGANIZED HEALTH CARE EDUCATION/TRAINING PROGRAM

## 2020-08-28 RX ORDER — PREDNISONE 20 MG/1
60 TABLET ORAL DAILY
Qty: 15 TABLET | Refills: 0 | Status: SHIPPED | OUTPATIENT
Start: 2020-08-28 | End: 2020-09-02

## 2020-08-28 RX ORDER — ALBUTEROL SULFATE 90 UG/1
10 AEROSOL, METERED RESPIRATORY (INHALATION) 4 TIMES DAILY
Status: DISCONTINUED | OUTPATIENT
Start: 2020-08-28 | End: 2020-08-28

## 2020-08-28 RX ORDER — ALBUTEROL SULFATE 90 UG/1
AEROSOL, METERED RESPIRATORY (INHALATION)
Status: COMPLETED
Start: 2020-08-28 | End: 2020-08-28

## 2020-08-28 RX ORDER — ALBUTEROL SULFATE 90 UG/1
2 AEROSOL, METERED RESPIRATORY (INHALATION) EVERY 4 HOURS PRN
Qty: 1 INHALER | Refills: 0 | Status: SHIPPED | OUTPATIENT
Start: 2020-08-28 | End: 2020-09-27

## 2020-08-28 RX ORDER — AZITHROMYCIN 250 MG/1
TABLET, FILM COATED ORAL
Qty: 1 PACKAGE | Refills: 0 | Status: SHIPPED | OUTPATIENT
Start: 2020-08-28 | End: 2020-09-02

## 2020-08-28 RX ORDER — METHYLPREDNISOLONE SODIUM SUCCINATE 125 MG/2ML
125 INJECTION, POWDER, LYOPHILIZED, FOR SOLUTION INTRAMUSCULAR; INTRAVENOUS ONCE
Status: COMPLETED | OUTPATIENT
Start: 2020-08-28 | End: 2020-08-28

## 2020-08-28 NOTE — ED INITIAL ASSESSMENT (HPI)
Pt states SOB and SIEAN x 1 week. Pt states today it got worse. Pt states dyspnea on exertion, pt denies cp. Pt has hx of asthma.

## 2020-08-28 NOTE — PROGRESS NOTES
Per MD, gave patient albuterol MDI. BS: diminished. Patient had non-productive, strong cough. SpO2 97% on room air.      08/28/20 0213   Respiratory Therapy / Neb Tx   $ RT Standby Charge (per 15 min) 2   $ Initial Tx & Set up Charge MDI   MD Order 8puffs a

## 2020-08-28 NOTE — ED PROVIDER NOTES
Patient Seen in: [facility] Emergency Department      History   Patient presents with:  Dyspnea SIENA SOB    Stated Complaint: SIENA    HPI    Patient is a 63-year-old male who presents emergency department reporting gradually worsening difficulty breat Physical Exam    Constitutional: oriented to person, place, and time, appears well-developed and well-nourished. HENT:   Head: Normocephalic and atraumatic. Eyes: Pupils are equal, round, and reactive to light. Neck: Normal range of motion.  Verita Pries the individual orders. SCAN SLIDE   RAINBOW DRAW BLUE   RAINBOW DRAW LAVENDER   RAINBOW DRAW LIGHT GREEN   RAINBOW DRAW GOLD   BLOOD CULTURE   BLOOD CULTURE     EKG    Rate, intervals and axes as noted on EKG Report.   Rate: 97  Rhythm: Sinus Rhythm  Read days.  Ace Bowman: 15 tablet Refills: 0    Albuterol Sulfate  (90 Base) MCG/ACT Inhalation Aero Soln  Inhale 2 puffs into the lungs every 4 (four) hours as needed for Wheezing.   Qty: 1 Inhaler Refills: 0    azithromycin (ZITHROMAX Z-MICHEL) 250 MG Oral Tab  5

## 2020-08-28 NOTE — PROGRESS NOTES
Patient BS: diminished. No respiratory distress noted at this time. SpO2 99%. Patient states no shortness of breath at this time. Per MD order, started patient on continuous 1 hr neb. 10mg albuterol, 1 mg atrovent. RT to continue monitoring.       08/28/20

## 2020-09-01 ENCOUNTER — HOSPITAL (OUTPATIENT)
Dept: OTHER | Age: 55
End: 2020-09-01
Attending: INTERNAL MEDICINE

## 2020-09-04 ENCOUNTER — DOCUMENTATION (OUTPATIENT)
Dept: GERIATRIC MEDICINE | Age: 55
End: 2020-09-04

## 2020-09-07 ENCOUNTER — TELEPHONE (OUTPATIENT)
Dept: SCHEDULING | Age: 55
End: 2020-09-07

## 2020-09-07 DIAGNOSIS — M51.36 BULGE OF LUMBAR DISC WITHOUT MYELOPATHY: ICD-10-CM

## 2020-09-07 RX ORDER — TRAMADOL HYDROCHLORIDE 50 MG/1
50 TABLET ORAL AT BEDTIME
Qty: 90 TABLET | Refills: 0 | Status: CANCELLED | OUTPATIENT
Start: 2020-09-07

## 2020-09-08 ENCOUNTER — TELEPHONE (OUTPATIENT)
Dept: SCHEDULING | Age: 55
End: 2020-09-08

## 2020-09-08 DIAGNOSIS — M51.36 BULGE OF LUMBAR DISC WITHOUT MYELOPATHY: ICD-10-CM

## 2020-09-08 RX ORDER — TRAMADOL HYDROCHLORIDE 50 MG/1
50 TABLET ORAL AT BEDTIME
Qty: 90 TABLET | Refills: 0 | Status: CANCELLED | OUTPATIENT
Start: 2020-09-08

## 2020-09-09 ENCOUNTER — TELEPHONE (OUTPATIENT)
Dept: SCHEDULING | Age: 55
End: 2020-09-09

## 2020-09-09 DIAGNOSIS — M51.36 BULGE OF LUMBAR DISC WITHOUT MYELOPATHY: ICD-10-CM

## 2020-09-09 RX ORDER — TRAMADOL HYDROCHLORIDE 50 MG/1
50 TABLET ORAL 2 TIMES DAILY
Qty: 120 TABLET | Refills: 0 | Status: SHIPPED | OUTPATIENT
Start: 2020-09-09 | End: 2020-12-09 | Stop reason: SDUPTHER

## 2020-09-15 ENCOUNTER — APPOINTMENT (OUTPATIENT)
Dept: NUCLEAR MEDICINE | Age: 55
End: 2020-09-15
Attending: ORTHOPAEDIC SURGERY

## 2020-09-16 ENCOUNTER — HOSPITAL ENCOUNTER (OUTPATIENT)
Dept: NUCLEAR MEDICINE | Age: 55
Discharge: HOME | End: 2020-09-16
Attending: ORTHOPAEDIC SURGERY

## 2020-09-16 ENCOUNTER — APPOINTMENT (OUTPATIENT)
Dept: NUCLEAR MEDICINE | Age: 55
End: 2020-09-16
Attending: ORTHOPAEDIC SURGERY

## 2020-09-16 DIAGNOSIS — M51.36 DDD (DEGENERATIVE DISC DISEASE), LUMBAR: ICD-10-CM

## 2020-09-16 PROCEDURE — 78803 RP LOCLZJ TUM SPECT 1 AREA: CPT

## 2020-09-16 PROCEDURE — A9503 TC99M MEDRONATE: HCPCS | Performed by: ORTHOPAEDIC SURGERY

## 2020-09-16 PROCEDURE — 10006150 HB RX 343: Performed by: ORTHOPAEDIC SURGERY

## 2020-09-16 RX ORDER — TC 99M MEDRONATE 20 MG/10ML
25.1 INJECTION, POWDER, LYOPHILIZED, FOR SOLUTION INTRAVENOUS ONCE
Status: COMPLETED | OUTPATIENT
Start: 2020-09-16 | End: 2020-09-16

## 2020-09-16 RX ADMIN — TC 99M MEDRONATE 25.1 MILLICURIE: 20 INJECTION, POWDER, LYOPHILIZED, FOR SOLUTION INTRAVENOUS at 09:05

## 2020-10-06 ENCOUNTER — APPOINTMENT (OUTPATIENT)
Dept: RHEUMATOLOGY | Age: 55
End: 2020-10-06

## 2020-10-07 VITALS
TEMPERATURE: 96.8 F | HEIGHT: 65 IN | SYSTOLIC BLOOD PRESSURE: 157 MMHG | RESPIRATION RATE: 18 BRPM | WEIGHT: 182.98 LBS | DIASTOLIC BLOOD PRESSURE: 89 MMHG | HEART RATE: 69 BPM | BODY MASS INDEX: 30.49 KG/M2

## 2020-10-07 VITALS
BODY MASS INDEX: 30.65 KG/M2 | SYSTOLIC BLOOD PRESSURE: 155 MMHG | TEMPERATURE: 98.7 F | WEIGHT: 183.97 LBS | RESPIRATION RATE: 18 BRPM | HEART RATE: 74 BPM | DIASTOLIC BLOOD PRESSURE: 84 MMHG | HEIGHT: 65 IN

## 2020-10-07 VITALS
SYSTOLIC BLOOD PRESSURE: 147 MMHG | WEIGHT: 181.55 LBS | BODY MASS INDEX: 30.25 KG/M2 | HEIGHT: 65 IN | TEMPERATURE: 98.5 F | HEART RATE: 74 BPM | RESPIRATION RATE: 16 BRPM | DIASTOLIC BLOOD PRESSURE: 92 MMHG

## 2020-10-07 VITALS
SYSTOLIC BLOOD PRESSURE: 144 MMHG | BODY MASS INDEX: 29.64 KG/M2 | RESPIRATION RATE: 14 BRPM | HEIGHT: 65 IN | TEMPERATURE: 98.6 F | DIASTOLIC BLOOD PRESSURE: 92 MMHG | HEART RATE: 70 BPM

## 2020-10-07 VITALS
RESPIRATION RATE: 20 BRPM | DIASTOLIC BLOOD PRESSURE: 111 MMHG | WEIGHT: 179.56 LBS | SYSTOLIC BLOOD PRESSURE: 180 MMHG | TEMPERATURE: 98 F | BODY MASS INDEX: 29.92 KG/M2 | HEART RATE: 76 BPM | HEIGHT: 65 IN

## 2020-10-08 VITALS
DIASTOLIC BLOOD PRESSURE: 91 MMHG | WEIGHT: 182.87 LBS | SYSTOLIC BLOOD PRESSURE: 145 MMHG | HEART RATE: 67 BPM | TEMPERATURE: 97.1 F | BODY MASS INDEX: 30.47 KG/M2 | RESPIRATION RATE: 16 BRPM | HEIGHT: 65 IN

## 2020-10-08 VITALS
DIASTOLIC BLOOD PRESSURE: 92 MMHG | BODY MASS INDEX: 30.08 KG/M2 | RESPIRATION RATE: 14 BRPM | HEIGHT: 65 IN | TEMPERATURE: 97.6 F | WEIGHT: 180.56 LBS | HEART RATE: 78 BPM | SYSTOLIC BLOOD PRESSURE: 137 MMHG

## 2020-10-08 VITALS
SYSTOLIC BLOOD PRESSURE: 155 MMHG | HEIGHT: 65 IN | BODY MASS INDEX: 30.01 KG/M2 | TEMPERATURE: 97.6 F | WEIGHT: 180.12 LBS | RESPIRATION RATE: 14 BRPM | DIASTOLIC BLOOD PRESSURE: 88 MMHG | HEART RATE: 65 BPM

## 2020-10-08 VITALS
DIASTOLIC BLOOD PRESSURE: 94 MMHG | TEMPERATURE: 98 F | WEIGHT: 184.53 LBS | HEIGHT: 65 IN | SYSTOLIC BLOOD PRESSURE: 172 MMHG | HEART RATE: 75 BPM | BODY MASS INDEX: 30.74 KG/M2 | RESPIRATION RATE: 18 BRPM

## 2020-10-08 VITALS
HEART RATE: 76 BPM | DIASTOLIC BLOOD PRESSURE: 92 MMHG | WEIGHT: 181.66 LBS | HEIGHT: 65 IN | BODY MASS INDEX: 30.27 KG/M2 | TEMPERATURE: 98.8 F | SYSTOLIC BLOOD PRESSURE: 159 MMHG | RESPIRATION RATE: 16 BRPM

## 2020-10-08 VITALS
SYSTOLIC BLOOD PRESSURE: 154 MMHG | TEMPERATURE: 96.2 F | HEIGHT: 65 IN | WEIGHT: 180.34 LBS | HEART RATE: 70 BPM | DIASTOLIC BLOOD PRESSURE: 89 MMHG | BODY MASS INDEX: 30.05 KG/M2 | RESPIRATION RATE: 16 BRPM

## 2020-10-08 VITALS
WEIGHT: 178.46 LBS | SYSTOLIC BLOOD PRESSURE: 160 MMHG | RESPIRATION RATE: 16 BRPM | HEART RATE: 69 BPM | TEMPERATURE: 97.4 F | HEIGHT: 65 IN | DIASTOLIC BLOOD PRESSURE: 93 MMHG | BODY MASS INDEX: 29.73 KG/M2

## 2020-10-08 VITALS
RESPIRATION RATE: 18 BRPM | HEART RATE: 68 BPM | TEMPERATURE: 96.8 F | SYSTOLIC BLOOD PRESSURE: 148 MMHG | DIASTOLIC BLOOD PRESSURE: 94 MMHG | HEIGHT: 65 IN | BODY MASS INDEX: 29.86 KG/M2 | WEIGHT: 179.23 LBS

## 2020-10-08 VITALS
HEIGHT: 65 IN | SYSTOLIC BLOOD PRESSURE: 149 MMHG | RESPIRATION RATE: 16 BRPM | DIASTOLIC BLOOD PRESSURE: 92 MMHG | BODY MASS INDEX: 30.63 KG/M2 | HEART RATE: 65 BPM | WEIGHT: 183.86 LBS | TEMPERATURE: 96.8 F

## 2020-10-08 VITALS
HEIGHT: 65 IN | TEMPERATURE: 97.3 F | DIASTOLIC BLOOD PRESSURE: 90 MMHG | WEIGHT: 183.86 LBS | BODY MASS INDEX: 30.63 KG/M2 | RESPIRATION RATE: 16 BRPM | SYSTOLIC BLOOD PRESSURE: 151 MMHG | HEART RATE: 72 BPM

## 2020-10-08 VITALS
TEMPERATURE: 97.6 F | RESPIRATION RATE: 16 BRPM | SYSTOLIC BLOOD PRESSURE: 147 MMHG | HEIGHT: 65 IN | BODY MASS INDEX: 30.31 KG/M2 | HEART RATE: 77 BPM | DIASTOLIC BLOOD PRESSURE: 88 MMHG

## 2020-10-08 VITALS
WEIGHT: 182.1 LBS | HEIGHT: 65 IN | TEMPERATURE: 98.2 F | HEART RATE: 81 BPM | BODY MASS INDEX: 30.34 KG/M2 | DIASTOLIC BLOOD PRESSURE: 97 MMHG | RESPIRATION RATE: 16 BRPM | SYSTOLIC BLOOD PRESSURE: 169 MMHG

## 2020-10-09 ENCOUNTER — DOCUMENTATION (OUTPATIENT)
Dept: GERIATRIC MEDICINE | Age: 55
End: 2020-10-09

## 2020-10-13 ENCOUNTER — OFFICE VISIT (OUTPATIENT)
Dept: RHEUMATOLOGY | Age: 55
End: 2020-10-13

## 2020-10-13 ENCOUNTER — TELEPHONE (OUTPATIENT)
Dept: ENDOCRINOLOGY | Age: 55
End: 2020-10-13

## 2020-10-13 ENCOUNTER — DOCUMENTATION (OUTPATIENT)
Dept: GERIATRIC MEDICINE | Age: 55
End: 2020-10-13

## 2020-10-13 VITALS
DIASTOLIC BLOOD PRESSURE: 89 MMHG | TEMPERATURE: 97.9 F | SYSTOLIC BLOOD PRESSURE: 154 MMHG | HEIGHT: 65 IN | BODY MASS INDEX: 30.71 KG/M2 | WEIGHT: 184.3 LBS | RESPIRATION RATE: 18 BRPM | HEART RATE: 79 BPM

## 2020-10-13 VITALS — HEIGHT: 65 IN | BODY MASS INDEX: 29.32 KG/M2 | WEIGHT: 176 LBS

## 2020-10-13 DIAGNOSIS — M48.061 SPINAL STENOSIS OF LUMBAR REGION, UNSPECIFIED WHETHER NEUROGENIC CLAUDICATION PRESENT: Primary | ICD-10-CM

## 2020-10-13 DIAGNOSIS — M79.7 FIBROMYALGIA: ICD-10-CM

## 2020-10-13 DIAGNOSIS — M51.36 BULGE OF LUMBAR DISC WITHOUT MYELOPATHY: ICD-10-CM

## 2020-10-13 DIAGNOSIS — M54.50 BILATERAL LOW BACK PAIN WITHOUT SCIATICA, UNSPECIFIED CHRONICITY: ICD-10-CM

## 2020-10-13 PROCEDURE — 99214 OFFICE O/P EST MOD 30 MIN: CPT | Performed by: INTERNAL MEDICINE

## 2020-10-13 RX ORDER — TRIAMCINOLONE ACETONIDE 1 MG/G
CREAM TOPICAL
COMMUNITY
Start: 2020-09-30 | End: 2022-07-25 | Stop reason: SDUPTHER

## 2020-10-13 RX ORDER — MELOXICAM 7.5 MG/1
7.5 TABLET ORAL DAILY
COMMUNITY
Start: 2020-09-03 | End: 2021-12-29

## 2020-10-13 RX ORDER — PIMECROLIMUS 10 MG/G
CREAM TOPICAL
COMMUNITY
Start: 2020-09-30 | End: 2022-04-27 | Stop reason: ALTCHOICE

## 2020-10-13 RX ORDER — AZITHROMYCIN 250 MG/1
TABLET, FILM COATED ORAL
COMMUNITY
Start: 2020-08-28 | End: 2021-03-22 | Stop reason: ALTCHOICE

## 2020-10-13 RX ORDER — DESONIDE 0.5 MG/G
CREAM TOPICAL
COMMUNITY
Start: 2020-09-30 | End: 2021-09-29 | Stop reason: SDUPTHER

## 2020-10-13 RX ORDER — ONDANSETRON 4 MG/1
4 TABLET, ORALLY DISINTEGRATING ORAL EVERY 6 HOURS PRN
COMMUNITY
Start: 2020-09-09

## 2020-10-13 RX ORDER — PREDNISONE 20 MG/1
TABLET ORAL
COMMUNITY
Start: 2020-08-28 | End: 2021-03-22 | Stop reason: ALTCHOICE

## 2020-10-13 ASSESSMENT — ENCOUNTER SYMPTOMS
UNEXPECTED WEIGHT CHANGE: 0
BLOOD IN STOOL: 0
FEVER: 0
VOMITING: 0
AGITATION: 0
EYE REDNESS: 0
WEAKNESS: 0
BACK PAIN: 1
NUMBNESS: 1
NAUSEA: 0
SORE THROAT: 0
ABDOMINAL PAIN: 0
EYE PAIN: 0
ADENOPATHY: 0
CHILLS: 0
DIARRHEA: 0
NERVOUS/ANXIOUS: 0
POLYDIPSIA: 0
SHORTNESS OF BREATH: 0
COLOR CHANGE: 0
COUGH: 0

## 2020-10-16 DIAGNOSIS — C82.21: Primary | ICD-10-CM

## 2020-10-27 ENCOUNTER — OFFICE VISIT (OUTPATIENT)
Dept: HEMATOLOGY/ONCOLOGY | Age: 55
End: 2020-10-27
Attending: INTERNAL MEDICINE

## 2020-10-27 ENCOUNTER — CLINICAL ABSTRACT (OUTPATIENT)
Dept: HEMATOLOGY/ONCOLOGY | Age: 55
End: 2020-10-27

## 2020-10-27 DIAGNOSIS — C82.03 FOLLICULAR LYMPHOMA GRADE I OF INTRA-ABDOMINAL LYMPH NODES (CMD): Primary | ICD-10-CM

## 2020-10-27 PROCEDURE — 96413 CHEMO IV INFUSION 1 HR: CPT

## 2020-10-27 PROCEDURE — 99213 OFFICE O/P EST LOW 20 MIN: CPT | Performed by: INTERNAL MEDICINE

## 2020-10-27 PROCEDURE — 10002803 HB RX 637: Performed by: INTERNAL MEDICINE

## 2020-10-27 PROCEDURE — 96415 CHEMO IV INFUSION ADDL HR: CPT

## 2020-10-27 PROCEDURE — 10004651 HB RX, NO CHARGE ITEM: Performed by: INTERNAL MEDICINE

## 2020-10-27 PROCEDURE — 10002807 HB RX 258: Performed by: INTERNAL MEDICINE

## 2020-10-27 PROCEDURE — 10002800 HB RX 250 W HCPCS: Performed by: INTERNAL MEDICINE

## 2020-10-27 RX ORDER — ACETAMINOPHEN 325 MG/1
650 TABLET ORAL ONCE
Status: COMPLETED | OUTPATIENT
Start: 2020-10-27 | End: 2020-10-27

## 2020-10-27 RX ORDER — DIPHENHYDRAMINE HCL 25 MG
25 CAPSULE ORAL ONCE
Status: COMPLETED | OUTPATIENT
Start: 2020-10-27 | End: 2020-10-27

## 2020-10-27 RX ORDER — ACETAMINOPHEN 325 MG/1
650 TABLET ORAL ONCE
Status: CANCELLED
Start: 2020-10-27

## 2020-10-27 RX ORDER — HEPARIN 100 UNIT/ML
5 SYRINGE INTRAVENOUS
Status: CANCELLED | OUTPATIENT
Start: 2020-10-27

## 2020-10-27 RX ORDER — DIPHENHYDRAMINE HCL 25 MG
25 CAPSULE ORAL ONCE
Status: CANCELLED | OUTPATIENT
Start: 2020-10-27

## 2020-10-27 RX ADMIN — ACETAMINOPHEN 650 MG: 325 TABLET ORAL at 10:30

## 2020-10-27 RX ADMIN — DIPHENHYDRAMINE HYDROCHLORIDE 25 MG: 25 CAPSULE ORAL at 10:30

## 2020-10-27 RX ADMIN — RITUXIMAB 700 MG: 10 INJECTION, SOLUTION INTRAVENOUS at 11:45

## 2020-10-27 ASSESSMENT — PATIENT HEALTH QUESTIONNAIRE - PHQ9
CLINICAL INTERPRETATION OF PHQ9 SCORE: NO FURTHER SCREENING NEEDED
1. LITTLE INTEREST OR PLEASURE IN DOING THINGS: SEVERAL DAYS
2. FEELING DOWN, DEPRESSED OR HOPELESS: NOT AT ALL
SUM OF ALL RESPONSES TO PHQ9 QUESTIONS 1 AND 2: 1
CLINICAL INTERPRETATION OF PHQ2 SCORE: NO FURTHER SCREENING NEEDED
SUM OF ALL RESPONSES TO PHQ9 QUESTIONS 1 AND 2: 1

## 2020-10-27 ASSESSMENT — PAIN SCALES - GENERAL: PAINLEVEL: 7-8

## 2020-11-12 ENCOUNTER — TELEPHONE (OUTPATIENT)
Dept: SCHEDULING | Age: 55
End: 2020-11-12

## 2020-11-13 ENCOUNTER — DOCUMENTATION (OUTPATIENT)
Dept: GERIATRIC MEDICINE | Age: 55
End: 2020-11-13

## 2020-11-13 PROBLEM — Z23 NEED FOR SHINGLES VACCINE: Status: ACTIVE | Noted: 2020-11-13

## 2020-11-13 PROBLEM — Z11.59 ENCOUNTER FOR HEPATITIS C SCREENING TEST FOR LOW RISK PATIENT: Status: ACTIVE | Noted: 2020-11-13

## 2020-11-13 PROBLEM — Z23 NEED FOR PNEUMOCOCCAL VACCINATION: Status: ACTIVE | Noted: 2020-11-13

## 2020-11-18 ENCOUNTER — IMMUNIZATION (OUTPATIENT)
Dept: FAMILY MEDICINE | Age: 55
End: 2020-11-18

## 2020-11-18 ENCOUNTER — TELEPHONE (OUTPATIENT)
Dept: SCHEDULING | Age: 55
End: 2020-11-18

## 2020-11-18 VITALS — TEMPERATURE: 96.4 F

## 2020-11-18 DIAGNOSIS — Z23 NEED FOR INFLUENZA VACCINATION: Primary | ICD-10-CM

## 2020-11-18 PROCEDURE — 90686 IIV4 VACC NO PRSV 0.5 ML IM: CPT

## 2020-11-18 PROCEDURE — 90471 IMMUNIZATION ADMIN: CPT

## 2020-12-02 ENCOUNTER — TELEPHONE (OUTPATIENT)
Dept: SCHEDULING | Age: 55
End: 2020-12-02

## 2020-12-08 ENCOUNTER — TELEPHONE (OUTPATIENT)
Dept: SCHEDULING | Age: 55
End: 2020-12-08

## 2020-12-09 DIAGNOSIS — M51.36 BULGE OF LUMBAR DISC WITHOUT MYELOPATHY: ICD-10-CM

## 2020-12-09 RX ORDER — TRAMADOL HYDROCHLORIDE 50 MG/1
50 TABLET ORAL 2 TIMES DAILY
Qty: 120 TABLET | Refills: 0 | Status: SHIPPED | OUTPATIENT
Start: 2020-12-09 | End: 2021-04-14 | Stop reason: SDUPTHER

## 2020-12-10 ENCOUNTER — OFFICE VISIT (OUTPATIENT)
Dept: INTERNAL MEDICINE | Age: 55
End: 2020-12-10

## 2020-12-10 DIAGNOSIS — D12.6 TUBULAR ADENOMA OF COLON: Primary | ICD-10-CM

## 2020-12-10 DIAGNOSIS — M54.50 BILATERAL LOW BACK PAIN WITHOUT SCIATICA, UNSPECIFIED CHRONICITY: ICD-10-CM

## 2020-12-10 DIAGNOSIS — C85.93 LYMPHOMA OF INTRA-ABDOMINAL LYMPH NODES, UNSPECIFIED LYMPHOMA TYPE (CMD): ICD-10-CM

## 2020-12-10 DIAGNOSIS — Z23 NEED FOR PNEUMOCOCCAL VACCINATION: ICD-10-CM

## 2020-12-10 DIAGNOSIS — D64.9 ANEMIA, UNSPECIFIED TYPE: ICD-10-CM

## 2020-12-10 DIAGNOSIS — M54.32 SCIATICA OF LEFT SIDE: ICD-10-CM

## 2020-12-10 DIAGNOSIS — C92.01 ACUTE MYELOBLASTIC LEUKEMIA IN REMISSION (CMD): ICD-10-CM

## 2020-12-10 DIAGNOSIS — M51.36 BULGE OF LUMBAR DISC WITHOUT MYELOPATHY: ICD-10-CM

## 2020-12-10 DIAGNOSIS — Z11.59 ENCOUNTER FOR HEPATITIS C SCREENING TEST FOR LOW RISK PATIENT: ICD-10-CM

## 2020-12-10 DIAGNOSIS — G47.33 OBSTRUCTIVE SLEEP APNEA: ICD-10-CM

## 2020-12-10 DIAGNOSIS — Z23 NEED FOR SHINGLES VACCINE: ICD-10-CM

## 2020-12-10 DIAGNOSIS — N20.0 NEPHROLITHIASIS: ICD-10-CM

## 2020-12-10 DIAGNOSIS — C82.03 FOLLICULAR LYMPHOMA GRADE I OF INTRA-ABDOMINAL LYMPH NODES (CMD): ICD-10-CM

## 2020-12-10 DIAGNOSIS — Z00.00 ENCOUNTER FOR GENERAL HEALTH EXAMINATION: ICD-10-CM

## 2020-12-10 DIAGNOSIS — M48.061 SPINAL STENOSIS OF LUMBAR REGION, UNSPECIFIED WHETHER NEUROGENIC CLAUDICATION PRESENT: ICD-10-CM

## 2020-12-10 PROCEDURE — 90732 PPSV23 VACC 2 YRS+ SUBQ/IM: CPT

## 2020-12-10 PROCEDURE — 99396 PREV VISIT EST AGE 40-64: CPT | Performed by: INTERNAL MEDICINE

## 2020-12-10 PROCEDURE — 90471 IMMUNIZATION ADMIN: CPT

## 2020-12-10 ASSESSMENT — ENCOUNTER SYMPTOMS
DIARRHEA: 0
FATIGUE: 0
PHOTOPHOBIA: 0
NUMBNESS: 0
FACIAL SWELLING: 0
FEVER: 0
EYE DISCHARGE: 0
SEIZURES: 0
HEADACHES: 0
RECTAL PAIN: 0
SHORTNESS OF BREATH: 0
TROUBLE SWALLOWING: 0
EYE PAIN: 0
SORE THROAT: 0
ABDOMINAL PAIN: 0
TREMORS: 0
SINUS PRESSURE: 0
CONSTIPATION: 0
VOMITING: 0
CONFUSION: 0
ADENOPATHY: 0
BRUISES/BLEEDS EASILY: 0
CHEST TIGHTNESS: 0
SLEEP DISTURBANCE: 0

## 2020-12-10 ASSESSMENT — PATIENT HEALTH QUESTIONNAIRE - PHQ9
1. LITTLE INTEREST OR PLEASURE IN DOING THINGS: NOT AT ALL
SUM OF ALL RESPONSES TO PHQ9 QUESTIONS 1 AND 2: 0
CLINICAL INTERPRETATION OF PHQ2 SCORE: NO FURTHER SCREENING NEEDED
SUM OF ALL RESPONSES TO PHQ9 QUESTIONS 1 AND 2: 0
2. FEELING DOWN, DEPRESSED OR HOPELESS: NOT AT ALL
CLINICAL INTERPRETATION OF PHQ9 SCORE: NO FURTHER SCREENING NEEDED

## 2020-12-10 ASSESSMENT — PAIN SCALES - GENERAL: PAINLEVEL: 5-6

## 2020-12-15 ENCOUNTER — V-VISIT (OUTPATIENT)
Dept: SLEEP MEDICINE | Age: 55
End: 2020-12-15
Attending: NURSE PRACTITIONER

## 2020-12-15 VITALS — BODY MASS INDEX: 28.61 KG/M2 | HEIGHT: 66 IN | WEIGHT: 178 LBS

## 2020-12-15 DIAGNOSIS — G47.33 OBSTRUCTIVE SLEEP APNEA: Primary | ICD-10-CM

## 2020-12-15 PROCEDURE — 99214 OFFICE O/P EST MOD 30 MIN: CPT | Performed by: NURSE PRACTITIONER

## 2020-12-15 RX ORDER — MODAFINIL 200 MG/1
200 TABLET ORAL DAILY
Qty: 30 TABLET | Refills: 2 | Status: SHIPPED | OUTPATIENT
Start: 2020-12-15 | End: 2021-02-18 | Stop reason: SDUPTHER

## 2020-12-15 ASSESSMENT — ENCOUNTER SYMPTOMS
CONSTITUTIONAL NEGATIVE: 1
ENDOCRINE NEGATIVE: 1
EYES NEGATIVE: 1
RESPIRATORY NEGATIVE: 1
NEUROLOGICAL NEGATIVE: 1
PSYCHIATRIC NEGATIVE: 1
ALLERGIC/IMMUNOLOGIC NEGATIVE: 1
HEMATOLOGIC/LYMPHATIC NEGATIVE: 1
GASTROINTESTINAL NEGATIVE: 1

## 2020-12-18 ENCOUNTER — TELEPHONE (OUTPATIENT)
Dept: SCHEDULING | Age: 55
End: 2020-12-18

## 2020-12-18 ENCOUNTER — LAB SERVICES (OUTPATIENT)
Dept: LAB | Age: 55
End: 2020-12-18

## 2020-12-18 DIAGNOSIS — Z00.00 ENCOUNTER FOR GENERAL HEALTH EXAMINATION: ICD-10-CM

## 2020-12-18 DIAGNOSIS — Z11.59 ENCOUNTER FOR HEPATITIS C SCREENING TEST FOR LOW RISK PATIENT: ICD-10-CM

## 2020-12-18 LAB
APPEARANCE UR: CLEAR
BACTERIA #/AREA URNS HPF: NORMAL /HPF
BILIRUB UR QL STRIP: NEGATIVE
CHOLEST SERPL-MCNC: 205 MG/DL
CHOLEST/HDLC SERPL: 3 {RATIO}
COLOR UR: YELLOW
FASTING DURATION TIME PATIENT: 12 HOURS
GLUCOSE UR STRIP-MCNC: NEGATIVE MG/DL
HCV AB SER QL: NEGATIVE
HDLC SERPL-MCNC: 68 MG/DL
HGB UR QL STRIP: NEGATIVE
HYALINE CASTS #/AREA URNS LPF: NORMAL /LPF
KETONES UR STRIP-MCNC: NEGATIVE MG/DL
LDLC SERPL CALC-MCNC: 120 MG/DL
LEUKOCYTE ESTERASE UR QL STRIP: NEGATIVE
MUCOUS THREADS URNS QL MICRO: PRESENT
NITRITE UR QL STRIP: NEGATIVE
NONHDLC SERPL-MCNC: 137 MG/DL
PH UR STRIP: 5 [PH] (ref 5–7)
PROT UR STRIP-MCNC: NEGATIVE MG/DL
PSA SERPL-MCNC: 1.56 NG/ML
RBC #/AREA URNS HPF: NORMAL /HPF
SP GR UR STRIP: 1.02 (ref 1–1.03)
SQUAMOUS #/AREA URNS HPF: NORMAL /HPF
TRIGL SERPL-MCNC: 85 MG/DL
TSH SERPL-ACNC: 1.25 MCUNITS/ML (ref 0.35–5)
UROBILINOGEN UR STRIP-MCNC: 0.2 MG/DL
WBC #/AREA URNS HPF: NORMAL /HPF

## 2020-12-18 PROCEDURE — 84443 ASSAY THYROID STIM HORMONE: CPT | Performed by: INTERNAL MEDICINE

## 2020-12-18 PROCEDURE — 86803 HEPATITIS C AB TEST: CPT | Performed by: INTERNAL MEDICINE

## 2020-12-18 PROCEDURE — 81001 URINALYSIS AUTO W/SCOPE: CPT | Performed by: INTERNAL MEDICINE

## 2020-12-18 PROCEDURE — 80061 LIPID PANEL: CPT | Performed by: INTERNAL MEDICINE

## 2020-12-18 PROCEDURE — 36415 COLL VENOUS BLD VENIPUNCTURE: CPT | Performed by: INTERNAL MEDICINE

## 2020-12-18 PROCEDURE — G0103 PSA SCREENING: HCPCS | Performed by: INTERNAL MEDICINE

## 2020-12-19 ENCOUNTER — DOCUMENTATION (OUTPATIENT)
Dept: GERIATRIC MEDICINE | Age: 55
End: 2020-12-19

## 2020-12-22 ENCOUNTER — TELEPHONE (OUTPATIENT)
Dept: SCHEDULING | Age: 55
End: 2020-12-22

## 2020-12-22 ENCOUNTER — TELEPHONE (OUTPATIENT)
Dept: NEUROSURGERY | Age: 55
End: 2020-12-22

## 2020-12-22 ENCOUNTER — TELEPHONE (OUTPATIENT)
Dept: HEMATOLOGY/ONCOLOGY | Age: 55
End: 2020-12-22

## 2020-12-29 ENCOUNTER — TELEPHONE (OUTPATIENT)
Dept: NEUROSURGERY | Age: 55
End: 2020-12-29

## 2020-12-29 ENCOUNTER — OFFICE VISIT (OUTPATIENT)
Dept: NEUROSURGERY | Age: 55
End: 2020-12-29
Attending: INTERNAL MEDICINE

## 2020-12-29 ENCOUNTER — IMAGING SERVICES (OUTPATIENT)
Dept: GENERAL RADIOLOGY | Age: 55
End: 2020-12-29
Attending: NURSE PRACTITIONER

## 2020-12-29 VITALS
SYSTOLIC BLOOD PRESSURE: 147 MMHG | RESPIRATION RATE: 16 BRPM | WEIGHT: 178 LBS | HEART RATE: 73 BPM | DIASTOLIC BLOOD PRESSURE: 86 MMHG | BODY MASS INDEX: 28.61 KG/M2 | HEIGHT: 66 IN

## 2020-12-29 DIAGNOSIS — M54.9 BACK PAIN, UNSPECIFIED BACK LOCATION, UNSPECIFIED BACK PAIN LATERALITY, UNSPECIFIED CHRONICITY: ICD-10-CM

## 2020-12-29 DIAGNOSIS — M54.9 BACK PAIN, UNSPECIFIED BACK LOCATION, UNSPECIFIED BACK PAIN LATERALITY, UNSPECIFIED CHRONICITY: Primary | ICD-10-CM

## 2020-12-29 DIAGNOSIS — M54.16 PAIN, RADICULAR, LUMBAR: ICD-10-CM

## 2020-12-29 PROCEDURE — 99203 OFFICE O/P NEW LOW 30 MIN: CPT | Performed by: NEUROLOGICAL SURGERY

## 2020-12-29 PROCEDURE — 72110 X-RAY EXAM L-2 SPINE 4/>VWS: CPT | Performed by: NURSE PRACTITIONER

## 2021-01-01 ENCOUNTER — EXTERNAL RECORD (OUTPATIENT)
Dept: HEALTH INFORMATION MANAGEMENT | Facility: OTHER | Age: 56
End: 2021-01-01

## 2021-01-11 ENCOUNTER — V-VISIT (OUTPATIENT)
Dept: GASTROENTEROLOGY | Age: 56
End: 2021-01-11
Attending: INTERNAL MEDICINE

## 2021-01-11 DIAGNOSIS — D12.6 TUBULAR ADENOMA OF COLON: Primary | ICD-10-CM

## 2021-01-11 DIAGNOSIS — Z12.11 SPECIAL SCREENING FOR MALIGNANT NEOPLASMS, COLON: ICD-10-CM

## 2021-01-11 PROCEDURE — 99244 OFF/OP CNSLTJ NEW/EST MOD 40: CPT | Performed by: INTERNAL MEDICINE

## 2021-01-12 ENCOUNTER — TELEPHONE (OUTPATIENT)
Dept: GASTROENTEROLOGY | Age: 56
End: 2021-01-12

## 2021-01-20 DIAGNOSIS — C82.03 FOLLICULAR LYMPHOMA GRADE I OF INTRA-ABDOMINAL LYMPH NODES (CMD): Primary | ICD-10-CM

## 2021-01-25 ENCOUNTER — TELEPHONE (OUTPATIENT)
Dept: SCHEDULING | Age: 56
End: 2021-01-25

## 2021-01-25 ENCOUNTER — TELEPHONE (OUTPATIENT)
Dept: HEMATOLOGY/ONCOLOGY | Age: 56
End: 2021-01-25

## 2021-01-26 ENCOUNTER — APPOINTMENT (OUTPATIENT)
Dept: INTERNAL MEDICINE | Age: 56
End: 2021-01-26

## 2021-01-26 ENCOUNTER — NURSE ONLY (OUTPATIENT)
Dept: GERIATRIC MEDICINE | Age: 56
End: 2021-01-26

## 2021-01-26 ENCOUNTER — LAB SERVICES (OUTPATIENT)
Dept: LAB | Age: 56
End: 2021-01-26

## 2021-01-26 ENCOUNTER — TELEPHONE (OUTPATIENT)
Dept: GERIATRIC MEDICINE | Age: 56
End: 2021-01-26

## 2021-01-26 DIAGNOSIS — C82.03 FOLLICULAR LYMPHOMA GRADE I OF INTRA-ABDOMINAL LYMPH NODES (CMD): ICD-10-CM

## 2021-01-26 DIAGNOSIS — Z23 NEED FOR SHINGLES VACCINE: Primary | ICD-10-CM

## 2021-01-26 LAB
ALBUMIN SERPL-MCNC: 4.2 G/DL (ref 3.6–5.1)
ALBUMIN/GLOB SERPL: 1.4 {RATIO} (ref 1–2.4)
ALP SERPL-CCNC: 115 UNITS/L (ref 45–117)
ALT SERPL-CCNC: 34 UNITS/L
ANION GAP SERPL CALC-SCNC: 10 MMOL/L (ref 10–20)
AST SERPL-CCNC: 21 UNITS/L
BASO+EOS+MONOS # BLD: 0.6 K/MCL (ref 0.1–1.1)
BILIRUB SERPL-MCNC: 0.9 MG/DL (ref 0.2–1)
BUN SERPL-MCNC: 10 MG/DL (ref 6–20)
BUN/CREAT SERPL: 12 (ref 7–25)
CALCIUM SERPL-MCNC: 9.1 MG/DL (ref 8.4–10.2)
CHLORIDE SERPL-SCNC: 106 MMOL/L (ref 98–107)
CO2 SERPL-SCNC: 28 MMOL/L (ref 21–32)
CREAT SERPL-MCNC: 0.83 MG/DL (ref 0.67–1.17)
DEPRECATED RDW RBC: 41.4 FL (ref 39–50)
ERYTHROCYTE [DISTWIDTH] IN BLOOD: 13.6 % (ref 11–15)
FASTING DURATION TIME PATIENT: NORMAL H
GFR SERPLBLD BASED ON 1.73 SQ M-ARVRAT: >90 ML/MIN/1.73M2
GLOBULIN SER-MCNC: 3 G/DL (ref 2–4)
GLUCOSE SERPL-MCNC: 91 MG/DL (ref 65–99)
HCT VFR BLD CALC: 44.8 % (ref 39–51)
HGB BLD-MCNC: 15.8 G/DL (ref 13–17)
IMM GRANULOCYTES # BLD: 7 %
LYMPHOCYTES # BLD: 1.5 K/MCL (ref 1–4)
LYMPHOCYTES NFR BLD: 19 %
MCH RBC QN AUTO: 30.2 PG (ref 26–34)
MCHC RBC AUTO-ENTMCNC: 35.3 G/DL (ref 32–36.5)
MCV RBC AUTO: 85.5 FL (ref 78–100)
NEUTROPHILS # BLD: 5.7 K/MCL (ref 1.8–7.7)
NEUTROPHILS NFR BLD: 74 %
PLATELET # BLD AUTO: 164 K/MCL (ref 140–450)
POTASSIUM SERPL-SCNC: 3.7 MMOL/L (ref 3.4–5.1)
PROT SERPL-MCNC: 7.2 G/DL (ref 6.4–8.2)
RBC # BLD: 5.24 MIL/MCL (ref 4.5–5.9)
SODIUM SERPL-SCNC: 140 MMOL/L (ref 135–145)
WBC # BLD: 7.8 K/MCL (ref 4.2–11)

## 2021-01-26 PROCEDURE — 90471 IMMUNIZATION ADMIN: CPT | Performed by: INTERNAL MEDICINE

## 2021-01-26 PROCEDURE — 80053 COMPREHEN METABOLIC PANEL: CPT | Performed by: INTERNAL MEDICINE

## 2021-01-26 PROCEDURE — 90750 HZV VACC RECOMBINANT IM: CPT

## 2021-01-26 PROCEDURE — 85025 COMPLETE CBC W/AUTO DIFF WBC: CPT | Performed by: INTERNAL MEDICINE

## 2021-01-26 PROCEDURE — 82784 ASSAY IGA/IGD/IGG/IGM EACH: CPT | Performed by: INTERNAL MEDICINE

## 2021-01-26 PROCEDURE — 36415 COLL VENOUS BLD VENIPUNCTURE: CPT | Performed by: INTERNAL MEDICINE

## 2021-01-27 ENCOUNTER — OFFICE VISIT (OUTPATIENT)
Dept: HEMATOLOGY/ONCOLOGY | Age: 56
End: 2021-01-27
Attending: INTERNAL MEDICINE

## 2021-01-27 ENCOUNTER — CLINICAL ABSTRACT (OUTPATIENT)
Dept: INFUSION THERAPY | Age: 56
End: 2021-01-27

## 2021-01-27 DIAGNOSIS — C82.03 FOLLICULAR LYMPHOMA GRADE I OF INTRA-ABDOMINAL LYMPH NODES (CMD): ICD-10-CM

## 2021-01-27 DIAGNOSIS — C82.03 FOLLICULAR LYMPHOMA GRADE I OF INTRA-ABDOMINAL LYMPH NODES (CMD): Primary | ICD-10-CM

## 2021-01-27 LAB
IGA SERPL-MCNC: 166 MG/DL (ref 82–453)
IGG SERPL-MCNC: 938 MG/DL (ref 751–1560)
IGM SERPL-MCNC: 40 MG/DL (ref 46–304)

## 2021-01-27 PROCEDURE — 99213 OFFICE O/P EST LOW 20 MIN: CPT | Performed by: INTERNAL MEDICINE

## 2021-01-27 RX ORDER — DIPHENHYDRAMINE HCL 25 MG
25 CAPSULE ORAL ONCE
Status: CANCELLED | OUTPATIENT
Start: 2021-01-27

## 2021-01-27 RX ORDER — ACETAMINOPHEN 325 MG/1
650 TABLET ORAL ONCE
Status: CANCELLED
Start: 2021-01-27

## 2021-01-27 ASSESSMENT — PATIENT HEALTH QUESTIONNAIRE - PHQ9
SUM OF ALL RESPONSES TO PHQ9 QUESTIONS 1 AND 2: 1
CLINICAL INTERPRETATION OF PHQ9 SCORE: NO FURTHER SCREENING NEEDED
SUM OF ALL RESPONSES TO PHQ9 QUESTIONS 1 AND 2: 1
1. LITTLE INTEREST OR PLEASURE IN DOING THINGS: NOT AT ALL
CLINICAL INTERPRETATION OF PHQ2 SCORE: NO FURTHER SCREENING NEEDED
2. FEELING DOWN, DEPRESSED OR HOPELESS: SEVERAL DAYS

## 2021-01-27 ASSESSMENT — PAIN SCALES - GENERAL: PAINLEVEL: 9-10

## 2021-02-16 ENCOUNTER — TELEPHONE (OUTPATIENT)
Dept: SCHEDULING | Age: 56
End: 2021-02-16

## 2021-02-17 ENCOUNTER — OFFICE VISIT (OUTPATIENT)
Dept: INTERNAL MEDICINE | Age: 56
End: 2021-02-17

## 2021-02-17 ENCOUNTER — TELEPHONE (OUTPATIENT)
Dept: SCHEDULING | Age: 56
End: 2021-02-17

## 2021-02-17 ENCOUNTER — LAB SERVICES (OUTPATIENT)
Dept: LAB | Age: 56
End: 2021-02-17

## 2021-02-17 DIAGNOSIS — G47.33 OBSTRUCTIVE SLEEP APNEA: ICD-10-CM

## 2021-02-17 DIAGNOSIS — C85.93 LYMPHOMA OF INTRA-ABDOMINAL LYMPH NODES, UNSPECIFIED LYMPHOMA TYPE (CMD): ICD-10-CM

## 2021-02-17 DIAGNOSIS — C82.03 FOLLICULAR LYMPHOMA GRADE I OF INTRA-ABDOMINAL LYMPH NODES (CMD): ICD-10-CM

## 2021-02-17 DIAGNOSIS — I88.0 MESENTERIC ADENITIS: ICD-10-CM

## 2021-02-17 DIAGNOSIS — Z00.00 ENCOUNTER FOR GENERAL HEALTH EXAMINATION: ICD-10-CM

## 2021-02-17 DIAGNOSIS — D12.6 TUBULAR ADENOMA OF COLON: ICD-10-CM

## 2021-02-17 DIAGNOSIS — R82.90 ABNORMAL URINE ODOR: ICD-10-CM

## 2021-02-17 DIAGNOSIS — M79.7 FIBROMYALGIA: ICD-10-CM

## 2021-02-17 DIAGNOSIS — D12.6 TUBULAR ADENOMA OF COLON: Primary | ICD-10-CM

## 2021-02-17 LAB
BASOPHILS # BLD: 0.1 K/MCL (ref 0–0.3)
BASOPHILS NFR BLD: 1 %
CHOLEST SERPL-MCNC: 217 MG/DL
CHOLEST/HDLC SERPL: 3.7 {RATIO}
CRP SERPL-MCNC: <0.3 MG/DL
DEPRECATED RDW RBC: 39.2 FL (ref 39–50)
EOSINOPHIL # BLD: 0.5 K/MCL (ref 0–0.5)
EOSINOPHIL NFR BLD: 5 %
ERYTHROCYTE [DISTWIDTH] IN BLOOD: 12.7 % (ref 11–15)
ERYTHROCYTE [SEDIMENTATION RATE] IN BLOOD BY WESTERGREN METHOD: 7 MM/HR (ref 0–20)
FASTING DURATION TIME PATIENT: ABNORMAL H
HCT VFR BLD CALC: 44.9 % (ref 39–51)
HDLC SERPL-MCNC: 59 MG/DL
HGB BLD-MCNC: 15.5 G/DL (ref 13–17)
IMM GRANULOCYTES # BLD AUTO: 0 K/MCL (ref 0–0.2)
IMM GRANULOCYTES # BLD: 0 %
LDLC SERPL CALC-MCNC: 129 MG/DL
LYMPHOCYTES # BLD: 1.5 K/MCL (ref 1–4)
LYMPHOCYTES NFR BLD: 16 %
MCH RBC QN AUTO: 29.4 PG (ref 26–34)
MCHC RBC AUTO-ENTMCNC: 34.5 G/DL (ref 32–36.5)
MCV RBC AUTO: 85 FL (ref 78–100)
MONOCYTES # BLD: 0.6 K/MCL (ref 0.3–0.9)
MONOCYTES NFR BLD: 7 %
NEUTROPHILS # BLD: 6.2 K/MCL (ref 1.8–7.7)
NEUTROPHILS NFR BLD: 71 %
NONHDLC SERPL-MCNC: 158 MG/DL
NRBC BLD MANUAL-RTO: 0 /100 WBC
PLATELET # BLD AUTO: 181 K/MCL (ref 140–450)
RBC # BLD: 5.28 MIL/MCL (ref 4.5–5.9)
TRIGL SERPL-MCNC: 147 MG/DL
WBC # BLD: 8.9 K/MCL (ref 4.2–11)

## 2021-02-17 PROCEDURE — 85652 RBC SED RATE AUTOMATED: CPT | Performed by: INTERNAL MEDICINE

## 2021-02-17 PROCEDURE — 36415 COLL VENOUS BLD VENIPUNCTURE: CPT | Performed by: INTERNAL MEDICINE

## 2021-02-17 PROCEDURE — 80061 LIPID PANEL: CPT | Performed by: INTERNAL MEDICINE

## 2021-02-17 PROCEDURE — 86431 RHEUMATOID FACTOR QUANT: CPT | Performed by: INTERNAL MEDICINE

## 2021-02-17 PROCEDURE — 86140 C-REACTIVE PROTEIN: CPT | Performed by: INTERNAL MEDICINE

## 2021-02-17 PROCEDURE — 86200 CCP ANTIBODY: CPT | Performed by: INTERNAL MEDICINE

## 2021-02-17 PROCEDURE — 99214 OFFICE O/P EST MOD 30 MIN: CPT | Performed by: INTERNAL MEDICINE

## 2021-02-17 PROCEDURE — 86038 ANTINUCLEAR ANTIBODIES: CPT | Performed by: INTERNAL MEDICINE

## 2021-02-17 PROCEDURE — 80050 GENERAL HEALTH PANEL: CPT | Performed by: INTERNAL MEDICINE

## 2021-02-17 PROCEDURE — G0103 PSA SCREENING: HCPCS | Performed by: INTERNAL MEDICINE

## 2021-02-17 ASSESSMENT — ENCOUNTER SYMPTOMS
EYE PAIN: 0
FATIGUE: 0
DIARRHEA: 0
SLEEP DISTURBANCE: 0
SORE THROAT: 0
CONFUSION: 0
ADENOPATHY: 0
TROUBLE SWALLOWING: 0
BRUISES/BLEEDS EASILY: 0
SINUS PRESSURE: 0
PHOTOPHOBIA: 0
RECTAL PAIN: 0
NUMBNESS: 0
HEADACHES: 0
CHEST TIGHTNESS: 0
TREMORS: 0
EYE DISCHARGE: 0
CONSTIPATION: 0
VOMITING: 0
FACIAL SWELLING: 0
SEIZURES: 0
SHORTNESS OF BREATH: 0
ABDOMINAL PAIN: 0
FEVER: 0

## 2021-02-17 ASSESSMENT — PATIENT HEALTH QUESTIONNAIRE - PHQ9
2. FEELING DOWN, DEPRESSED OR HOPELESS: NOT AT ALL
CLINICAL INTERPRETATION OF PHQ9 SCORE: NO FURTHER SCREENING NEEDED
CLINICAL INTERPRETATION OF PHQ2 SCORE: NO FURTHER SCREENING NEEDED
1. LITTLE INTEREST OR PLEASURE IN DOING THINGS: NOT AT ALL
SUM OF ALL RESPONSES TO PHQ9 QUESTIONS 1 AND 2: 0
SUM OF ALL RESPONSES TO PHQ9 QUESTIONS 1 AND 2: 0

## 2021-02-17 ASSESSMENT — PAIN SCALES - GENERAL: PAINLEVEL: 7-8

## 2021-02-18 ENCOUNTER — DOCUMENTATION (OUTPATIENT)
Dept: GERIATRIC MEDICINE | Age: 56
End: 2021-02-18

## 2021-02-18 ENCOUNTER — TELEPHONE (OUTPATIENT)
Dept: GERIATRIC MEDICINE | Age: 56
End: 2021-02-18

## 2021-02-18 ENCOUNTER — TELEPHONE (OUTPATIENT)
Dept: GASTROENTEROLOGY | Age: 56
End: 2021-02-18

## 2021-02-18 DIAGNOSIS — D12.6 TUBULAR ADENOMA OF COLON: Primary | ICD-10-CM

## 2021-02-18 DIAGNOSIS — G47.33 OBSTRUCTIVE SLEEP APNEA: Primary | ICD-10-CM

## 2021-02-18 DIAGNOSIS — Z01.812 PRE-PROCEDURAL LABORATORY EXAMINATION: ICD-10-CM

## 2021-02-18 LAB
ALBUMIN SERPL-MCNC: 4 G/DL (ref 3.6–5.1)
ALBUMIN/GLOB SERPL: 1.4 {RATIO} (ref 1–2.4)
ALP SERPL-CCNC: 120 UNITS/L (ref 45–117)
ALT SERPL-CCNC: 32 UNITS/L
ANA SER QL IA: NEGATIVE
ANION GAP SERPL CALC-SCNC: 11 MMOL/L (ref 10–20)
AST SERPL-CCNC: 25 UNITS/L
BILIRUB SERPL-MCNC: 0.9 MG/DL (ref 0.2–1)
BUN SERPL-MCNC: 8 MG/DL (ref 6–20)
BUN/CREAT SERPL: 10 (ref 7–25)
CALCIUM SERPL-MCNC: 9.2 MG/DL (ref 8.4–10.2)
CCP AB SER IA-ACNC: 5 UNITS
CHLORIDE SERPL-SCNC: 104 MMOL/L (ref 98–107)
CO2 SERPL-SCNC: 30 MMOL/L (ref 21–32)
CREAT SERPL-MCNC: 0.84 MG/DL (ref 0.67–1.17)
FASTING DURATION TIME PATIENT: ABNORMAL H
GFR SERPLBLD BASED ON 1.73 SQ M-ARVRAT: >90 ML/MIN/1.73M2
GLOBULIN SER-MCNC: 2.9 G/DL (ref 2–4)
GLUCOSE SERPL-MCNC: 82 MG/DL (ref 65–99)
POTASSIUM SERPL-SCNC: 3.6 MMOL/L (ref 3.4–5.1)
PROT SERPL-MCNC: 6.9 G/DL (ref 6.4–8.2)
PSA SERPL-MCNC: 1.35 NG/ML
RHEUMATOID FACT SER NEPH-ACNC: <10 UNITS/ML
SODIUM SERPL-SCNC: 141 MMOL/L (ref 135–145)
TSH SERPL-ACNC: 0.97 MCUNITS/ML (ref 0.35–5)

## 2021-02-18 RX ORDER — MODAFINIL 200 MG/1
200 TABLET ORAL DAILY
Qty: 30 TABLET | Refills: 5 | Status: CANCELLED | OUTPATIENT
Start: 2021-02-18

## 2021-02-18 RX ORDER — SODIUM, POTASSIUM,MAG SULFATES 17.5-3.13G
SOLUTION, RECONSTITUTED, ORAL ORAL
Qty: 2 EACH | Refills: 0 | Status: SHIPPED | OUTPATIENT
Start: 2021-02-18 | End: 2021-03-18 | Stop reason: ALTCHOICE

## 2021-02-18 RX ORDER — SODIUM CHLORIDE, SODIUM LACTATE, POTASSIUM CHLORIDE, CALCIUM CHLORIDE 600; 310; 30; 20 MG/100ML; MG/100ML; MG/100ML; MG/100ML
INJECTION, SOLUTION INTRAVENOUS CONTINUOUS
Status: CANCELLED | OUTPATIENT
Start: 2021-02-18

## 2021-02-18 RX ORDER — MODAFINIL 200 MG/1
200 TABLET ORAL DAILY
Qty: 90 TABLET | Refills: 1 | Status: SHIPPED | OUTPATIENT
Start: 2021-02-18 | End: 2021-09-29 | Stop reason: SDUPTHER

## 2021-02-18 RX ORDER — 0.9 % SODIUM CHLORIDE 0.9 %
2 VIAL (ML) INJECTION EVERY 12 HOURS SCHEDULED
Status: CANCELLED | OUTPATIENT
Start: 2021-02-18

## 2021-02-18 RX ORDER — GABAPENTIN 600 MG/1
600 TABLET ORAL DAILY
Qty: 90 TABLET | Refills: 1 | Status: SHIPPED | OUTPATIENT
Start: 2021-02-18 | End: 2021-09-08 | Stop reason: SDUPTHER

## 2021-02-18 RX ORDER — MODAFINIL 200 MG/1
200 TABLET ORAL DAILY
Qty: 90 TABLET | Refills: 1 | Status: SHIPPED | OUTPATIENT
Start: 2021-02-18 | End: 2021-02-18 | Stop reason: SDUPTHER

## 2021-03-01 ENCOUNTER — HOSPITAL ENCOUNTER (OUTPATIENT)
Dept: PET IMAGING | Age: 56
Discharge: HOME | End: 2021-03-01
Attending: INTERNAL MEDICINE

## 2021-03-01 DIAGNOSIS — C82.03 FOLLICULAR LYMPHOMA GRADE I OF INTRA-ABDOMINAL LYMPH NODES (CMD): ICD-10-CM

## 2021-03-01 LAB — GLUCOSE BLDC GLUCOMTR-MCNC: 94 MG/DL (ref 70–99)

## 2021-03-01 PROCEDURE — 82962 GLUCOSE BLOOD TEST: CPT

## 2021-03-01 PROCEDURE — A9552 F18 FDG: HCPCS | Performed by: INTERNAL MEDICINE

## 2021-03-01 PROCEDURE — 10006150 HB RX 343: Performed by: INTERNAL MEDICINE

## 2021-03-01 PROCEDURE — 78815 PET IMAGE W/CT SKULL-THIGH: CPT

## 2021-03-01 RX ORDER — FLUDEOXYGLUCOSE F-18 300 MCI/ML
10.84 INJECTION INTRAVENOUS ONCE
Status: COMPLETED | OUTPATIENT
Start: 2021-03-01 | End: 2021-03-01

## 2021-03-01 RX ADMIN — FLUDEOXYGLUCOSE F-18 10.84 MILLICURIE: 300 INJECTION INTRAVENOUS at 11:25

## 2021-03-05 ENCOUNTER — APPOINTMENT (OUTPATIENT)
Dept: PET IMAGING | Age: 56
End: 2021-03-05
Attending: INTERNAL MEDICINE

## 2021-03-16 ENCOUNTER — LAB SERVICES (OUTPATIENT)
Dept: LAB | Age: 56
End: 2021-03-16

## 2021-03-16 DIAGNOSIS — Z01.812 PRE-PROCEDURAL LABORATORY EXAMINATION: ICD-10-CM

## 2021-03-16 LAB
SARS-COV-2 RNA RESP QL NAA+PROBE: NOT DETECTED
SERVICE CMNT-IMP: NORMAL
SERVICE CMNT-IMP: NORMAL

## 2021-03-16 PROCEDURE — U0003 INFECTIOUS AGENT DETECTION BY NUCLEIC ACID (DNA OR RNA); SEVERE ACUTE RESPIRATORY SYNDROME CORONAVIRUS 2 (SARS-COV-2) (CORONAVIRUS DISEASE [COVID-19]), AMPLIFIED PROBE TECHNIQUE, MAKING USE OF HIGH THROUGHPUT TECHNOLOGIES AS DESCRIBED BY CMS-2020-01-R: HCPCS | Performed by: INTERNAL MEDICINE

## 2021-03-16 PROCEDURE — U0005 INFEC AGEN DETEC AMPLI PROBE: HCPCS | Performed by: INTERNAL MEDICINE

## 2021-03-18 ENCOUNTER — HOSPITAL ENCOUNTER (OUTPATIENT)
Dept: GASTROENTEROLOGY | Age: 56
Discharge: HOME | End: 2021-03-18
Attending: INTERNAL MEDICINE

## 2021-03-18 ENCOUNTER — ANESTHESIA EVENT (OUTPATIENT)
Dept: GASTROENTEROLOGY | Age: 56
End: 2021-03-18

## 2021-03-18 ENCOUNTER — ANESTHESIA (OUTPATIENT)
Dept: GASTROENTEROLOGY | Age: 56
End: 2021-03-18

## 2021-03-18 VITALS
HEIGHT: 66 IN | BODY MASS INDEX: 27.97 KG/M2 | SYSTOLIC BLOOD PRESSURE: 136 MMHG | RESPIRATION RATE: 20 BRPM | HEART RATE: 69 BPM | TEMPERATURE: 97.3 F | WEIGHT: 174 LBS | DIASTOLIC BLOOD PRESSURE: 79 MMHG | OXYGEN SATURATION: 98 %

## 2021-03-18 DIAGNOSIS — D12.6 TUBULAR ADENOMA OF COLON: ICD-10-CM

## 2021-03-18 PROCEDURE — 88305 TISSUE EXAM BY PATHOLOGIST: CPT | Performed by: INTERNAL MEDICINE

## 2021-03-18 PROCEDURE — 13000004 HB  ANESTHESIA  GENERAL OUTSIDE OR

## 2021-03-18 PROCEDURE — 13000025 HB GI COMPLEX CASE EACH ADD MINUTE

## 2021-03-18 PROCEDURE — 45385 COLONOSCOPY W/LESION REMOVAL: CPT | Performed by: INTERNAL MEDICINE

## 2021-03-18 PROCEDURE — 13000024 HB GI COMPLEX CASE S/U + 1ST 15 MIN

## 2021-03-18 PROCEDURE — 10002803 HB RX 637: Performed by: INTERNAL MEDICINE

## 2021-03-18 PROCEDURE — 45380 COLONOSCOPY AND BIOPSY: CPT | Performed by: INTERNAL MEDICINE

## 2021-03-18 PROCEDURE — 13000001 HB PHASE II RECOVERY EA 30 MINUTES

## 2021-03-18 PROCEDURE — 10002807 HB RX 258: Performed by: INTERNAL MEDICINE

## 2021-03-18 PROCEDURE — 10002800 HB RX 250 W HCPCS: Performed by: ANESTHESIOLOGY

## 2021-03-18 RX ORDER — SODIUM CHLORIDE, SODIUM LACTATE, POTASSIUM CHLORIDE, CALCIUM CHLORIDE 600; 310; 30; 20 MG/100ML; MG/100ML; MG/100ML; MG/100ML
INJECTION, SOLUTION INTRAVENOUS CONTINUOUS
Status: DISCONTINUED | OUTPATIENT
Start: 2021-03-18 | End: 2021-03-20 | Stop reason: HOSPADM

## 2021-03-18 RX ORDER — 0.9 % SODIUM CHLORIDE 0.9 %
2 VIAL (ML) INJECTION EVERY 12 HOURS SCHEDULED
Status: DISCONTINUED | OUTPATIENT
Start: 2021-03-18 | End: 2021-03-20 | Stop reason: HOSPADM

## 2021-03-18 RX ORDER — PROPOFOL 10 MG/ML
INJECTION, EMULSION INTRAVENOUS PRN
Status: DISCONTINUED | OUTPATIENT
Start: 2021-03-18 | End: 2021-03-18

## 2021-03-18 RX ORDER — ONDANSETRON 2 MG/ML
4 INJECTION INTRAMUSCULAR; INTRAVENOUS 2 TIMES DAILY PRN
Status: DISCONTINUED | OUTPATIENT
Start: 2021-03-18 | End: 2021-03-20 | Stop reason: HOSPADM

## 2021-03-18 RX ORDER — SIMETHICONE 20 MG/.3ML
EMULSION ORAL PRN
Status: COMPLETED | OUTPATIENT
Start: 2021-03-18 | End: 2021-03-18

## 2021-03-18 RX ORDER — SODIUM CHLORIDE, SODIUM LACTATE, POTASSIUM CHLORIDE, CALCIUM CHLORIDE 600; 310; 30; 20 MG/100ML; MG/100ML; MG/100ML; MG/100ML
INJECTION, SOLUTION INTRAVENOUS
Status: COMPLETED
Start: 2021-03-18 | End: 2021-03-18

## 2021-03-18 RX ADMIN — SODIUM CHLORIDE, POTASSIUM CHLORIDE, SODIUM LACTATE AND CALCIUM CHLORIDE: 600; 310; 30; 20 INJECTION, SOLUTION INTRAVENOUS at 11:43

## 2021-03-18 RX ADMIN — SIMETHICONE 40 MG: 63.3; 3.7 SOLUTION/ DROPS ORAL at 12:01

## 2021-03-18 RX ADMIN — PROPOFOL 200 MCG/KG/MIN: 10 INJECTION, EMULSION INTRAVENOUS at 11:50

## 2021-03-18 RX ADMIN — PROPOFOL 80 MG: 10 INJECTION, EMULSION INTRAVENOUS at 11:50

## 2021-03-18 ASSESSMENT — ACTIVITIES OF DAILY LIVING (ADL)
HISTORY OF FALLING IN THE LAST YEAR (PRIOR TO ADMISSION): NO
ADL_BEFORE_ADMISSION: INDEPENDENT
ADL_SCORE: 12

## 2021-03-18 ASSESSMENT — PAIN SCALES - GENERAL: PAINLEVEL_OUTOF10: 0

## 2021-03-19 ENCOUNTER — DOCUMENTATION (OUTPATIENT)
Dept: GERIATRIC MEDICINE | Age: 56
End: 2021-03-19

## 2021-03-19 LAB
ASR DISCLAIMER: NORMAL
CASE RPRT: NORMAL
CLINICAL INFO: NORMAL
PATH REPORT.FINAL DX SPEC: NORMAL
PATH REPORT.GROSS SPEC: NORMAL

## 2021-03-20 DIAGNOSIS — J45.20 MILD INTERMITTENT ASTHMA WITHOUT COMPLICATION: ICD-10-CM

## 2021-03-22 RX ORDER — ROPINIROLE 1 MG/1
1 TABLET, FILM COATED ORAL NIGHTLY
Qty: 30 TABLET | Refills: 5 | Status: SHIPPED | OUTPATIENT
Start: 2021-03-22 | End: 2021-09-23

## 2021-03-22 RX ORDER — ALBUTEROL SULFATE 90 UG/1
2 AEROSOL, METERED RESPIRATORY (INHALATION) EVERY 4 HOURS PRN
Qty: 1 INHALER | Refills: 2 | Status: SHIPPED | OUTPATIENT
Start: 2021-03-22 | End: 2021-09-29 | Stop reason: SDUPTHER

## 2021-03-26 ENCOUNTER — NURSE ONLY (OUTPATIENT)
Dept: GERIATRIC MEDICINE | Age: 56
End: 2021-03-26

## 2021-04-12 ENCOUNTER — TELEPHONE (OUTPATIENT)
Dept: HEMATOLOGY/ONCOLOGY | Age: 56
End: 2021-04-12

## 2021-04-13 ENCOUNTER — TELEPHONE (OUTPATIENT)
Dept: ENDOCRINOLOGY | Age: 56
End: 2021-04-13

## 2021-04-13 ENCOUNTER — V-VISIT (OUTPATIENT)
Dept: RHEUMATOLOGY | Age: 56
End: 2021-04-13

## 2021-04-13 ENCOUNTER — DOCUMENTATION (OUTPATIENT)
Dept: GERIATRIC MEDICINE | Age: 56
End: 2021-04-13

## 2021-04-13 DIAGNOSIS — M79.7 FIBROMYALGIA: Primary | ICD-10-CM

## 2021-04-13 DIAGNOSIS — M48.061 SPINAL STENOSIS OF LUMBAR REGION, UNSPECIFIED WHETHER NEUROGENIC CLAUDICATION PRESENT: ICD-10-CM

## 2021-04-13 PROCEDURE — 99213 OFFICE O/P EST LOW 20 MIN: CPT | Performed by: INTERNAL MEDICINE

## 2021-04-13 ASSESSMENT — ENCOUNTER SYMPTOMS
POLYDIPSIA: 0
COLOR CHANGE: 0
UNEXPECTED WEIGHT CHANGE: 0
COUGH: 0
AGITATION: 0
FEVER: 0
ABDOMINAL PAIN: 0
DIARRHEA: 0
BACK PAIN: 1
EYE PAIN: 0
SORE THROAT: 0
VOMITING: 0
NUMBNESS: 1
ADENOPATHY: 0
WEAKNESS: 0
BLOOD IN STOOL: 0
SHORTNESS OF BREATH: 0
NERVOUS/ANXIOUS: 0
NAUSEA: 0
EYE REDNESS: 0
CHILLS: 0

## 2021-04-14 ENCOUNTER — OFFICE VISIT (OUTPATIENT)
Dept: HEMATOLOGY/ONCOLOGY | Age: 56
End: 2021-04-14
Attending: INTERNAL MEDICINE

## 2021-04-14 ENCOUNTER — LAB SERVICES (OUTPATIENT)
Dept: LAB | Age: 56
End: 2021-04-14

## 2021-04-14 DIAGNOSIS — C82.03 FOLLICULAR LYMPHOMA GRADE I OF INTRA-ABDOMINAL LYMPH NODES (CMD): Primary | ICD-10-CM

## 2021-04-14 DIAGNOSIS — C82.03 FOLLICULAR LYMPHOMA GRADE I OF INTRA-ABDOMINAL LYMPH NODES (CMD): ICD-10-CM

## 2021-04-14 DIAGNOSIS — M51.36 BULGE OF LUMBAR DISC WITHOUT MYELOPATHY: ICD-10-CM

## 2021-04-14 LAB
ALBUMIN SERPL-MCNC: 4.1 G/DL (ref 3.6–5.1)
ALBUMIN/GLOB SERPL: 1.4 {RATIO} (ref 1–2.4)
ALP SERPL-CCNC: 147 UNITS/L (ref 45–117)
ALT SERPL-CCNC: 34 UNITS/L
ANION GAP SERPL CALC-SCNC: 10 MMOL/L (ref 10–20)
AST SERPL-CCNC: 24 UNITS/L
BASOPHILS # BLD: 0.1 K/MCL (ref 0–0.3)
BASOPHILS NFR BLD: 1 %
BILIRUB SERPL-MCNC: 0.8 MG/DL (ref 0.2–1)
BUN SERPL-MCNC: 8 MG/DL (ref 6–20)
BUN/CREAT SERPL: 10 (ref 7–25)
CALCIUM SERPL-MCNC: 9.1 MG/DL (ref 8.4–10.2)
CHLORIDE SERPL-SCNC: 109 MMOL/L (ref 98–107)
CO2 SERPL-SCNC: 25 MMOL/L (ref 21–32)
CREAT SERPL-MCNC: 0.8 MG/DL (ref 0.67–1.17)
DEPRECATED RDW RBC: 47 FL (ref 39–50)
EOSINOPHIL # BLD: 0.7 K/MCL (ref 0–0.5)
EOSINOPHIL NFR BLD: 7 %
ERYTHROCYTE [DISTWIDTH] IN BLOOD: 14.4 % (ref 11–15)
FASTING DURATION TIME PATIENT: ABNORMAL H
GFR SERPLBLD BASED ON 1.73 SQ M-ARVRAT: >90 ML/MIN/1.73M2
GLOBULIN SER-MCNC: 2.9 G/DL (ref 2–4)
GLUCOSE SERPL-MCNC: 103 MG/DL (ref 65–99)
HCT VFR BLD CALC: 49.7 % (ref 39–51)
HGB BLD-MCNC: 15.7 G/DL (ref 13–17)
IMM GRANULOCYTES # BLD AUTO: 0 K/MCL (ref 0–0.2)
IMM GRANULOCYTES # BLD: 0 %
LYMPHOCYTES # BLD: 1.6 K/MCL (ref 1–4)
LYMPHOCYTES NFR BLD: 16 %
MCH RBC QN AUTO: 28.8 PG (ref 26–34)
MCHC RBC AUTO-ENTMCNC: 31.6 G/DL (ref 32–36.5)
MCV RBC AUTO: 91 FL (ref 78–100)
MONOCYTES # BLD: 0.6 K/MCL (ref 0.3–0.9)
MONOCYTES NFR BLD: 6 %
NEUTROPHILS # BLD: 7.4 K/MCL (ref 1.8–7.7)
NEUTROPHILS NFR BLD: 70 %
PLATELET # BLD AUTO: 176 K/MCL (ref 140–450)
POTASSIUM SERPL-SCNC: 3.8 MMOL/L (ref 3.4–5.1)
PROT SERPL-MCNC: 7 G/DL (ref 6.4–8.2)
RBC # BLD: 5.46 MIL/MCL (ref 4.5–5.9)
SODIUM SERPL-SCNC: 140 MMOL/L (ref 135–145)
WBC # BLD: 10.4 K/MCL (ref 4.2–11)

## 2021-04-14 PROCEDURE — 80053 COMPREHEN METABOLIC PANEL: CPT | Performed by: INTERNAL MEDICINE

## 2021-04-14 PROCEDURE — 99211 OFF/OP EST MAY X REQ PHY/QHP: CPT

## 2021-04-14 PROCEDURE — 99213 OFFICE O/P EST LOW 20 MIN: CPT | Performed by: INTERNAL MEDICINE

## 2021-04-14 PROCEDURE — 36415 COLL VENOUS BLD VENIPUNCTURE: CPT | Performed by: INTERNAL MEDICINE

## 2021-04-14 PROCEDURE — 85025 COMPLETE CBC W/AUTO DIFF WBC: CPT | Performed by: INTERNAL MEDICINE

## 2021-04-14 RX ORDER — METHYLPREDNISOLONE 4 MG
TABLET, DOSE PACK ORAL
Qty: 24 TABLET | Refills: 3 | Status: SHIPPED | OUTPATIENT
Start: 2021-04-14 | End: 2021-04-21

## 2021-04-14 RX ORDER — TAMSULOSIN HYDROCHLORIDE 0.4 MG/1
CAPSULE ORAL
Qty: 90 CAPSULE | Refills: 3 | Status: SHIPPED | OUTPATIENT
Start: 2021-04-14 | End: 2022-03-23

## 2021-04-14 ASSESSMENT — PATIENT HEALTH QUESTIONNAIRE - PHQ9
SUM OF ALL RESPONSES TO PHQ9 QUESTIONS 1 AND 2: 0
1. LITTLE INTEREST OR PLEASURE IN DOING THINGS: NOT AT ALL
CLINICAL INTERPRETATION OF PHQ2 SCORE: NO FURTHER SCREENING NEEDED
2. FEELING DOWN, DEPRESSED OR HOPELESS: NOT AT ALL
SUM OF ALL RESPONSES TO PHQ9 QUESTIONS 1 AND 2: 0
SUM OF ALL RESPONSES TO PHQ9 QUESTIONS 1 AND 2: 0
2. FEELING DOWN, DEPRESSED OR HOPELESS: NOT AT ALL
1. LITTLE INTEREST OR PLEASURE IN DOING THINGS: NOT AT ALL
CLINICAL INTERPRETATION OF PHQ2 SCORE: NO FURTHER SCREENING NEEDED
CLINICAL INTERPRETATION OF PHQ9 SCORE: NO FURTHER SCREENING NEEDED

## 2021-04-14 ASSESSMENT — PAIN SCALES - GENERAL: PAINLEVEL: 9-10

## 2021-04-15 RX ORDER — TRAMADOL HYDROCHLORIDE 50 MG/1
50 TABLET ORAL 2 TIMES DAILY
Qty: 120 TABLET | Refills: 0 | Status: SHIPPED | OUTPATIENT
Start: 2021-04-15 | End: 2021-09-08 | Stop reason: SDUPTHER

## 2021-04-15 RX ORDER — CYCLOBENZAPRINE HCL 5 MG
5 TABLET ORAL 2 TIMES DAILY PRN
Qty: 60 TABLET | Refills: 2 | Status: SHIPPED | OUTPATIENT
Start: 2021-04-15 | End: 2022-01-26 | Stop reason: SDUPTHER

## 2021-04-16 ENCOUNTER — TELEPHONE (OUTPATIENT)
Dept: HEMATOLOGY/ONCOLOGY | Age: 56
End: 2021-04-16

## 2021-05-06 ENCOUNTER — NURSE ONLY (OUTPATIENT)
Dept: GERIATRIC MEDICINE | Age: 56
End: 2021-05-06

## 2021-05-06 DIAGNOSIS — Z23 NEED FOR SHINGLES VACCINE: ICD-10-CM

## 2021-05-06 PROCEDURE — 90750 HZV VACC RECOMBINANT IM: CPT

## 2021-05-06 PROCEDURE — 90471 IMMUNIZATION ADMIN: CPT

## 2021-05-10 ENCOUNTER — TELEPHONE (OUTPATIENT)
Dept: GERIATRIC MEDICINE | Age: 56
End: 2021-05-10

## 2021-05-10 ENCOUNTER — TELEPHONE (OUTPATIENT)
Dept: SCHEDULING | Age: 56
End: 2021-05-10

## 2021-05-10 DIAGNOSIS — J45.21 INTERMITTENT ASTHMA WITH ACUTE EXACERBATION, UNSPECIFIED ASTHMA SEVERITY: Primary | ICD-10-CM

## 2021-05-10 DIAGNOSIS — M51.36 BULGE OF LUMBAR DISC WITHOUT MYELOPATHY: ICD-10-CM

## 2021-05-10 PROBLEM — J45.909 ASTHMA: Status: ACTIVE | Noted: 2021-05-10

## 2021-05-10 RX ORDER — PREDNISONE 20 MG/1
60 TABLET ORAL DAILY
Qty: 15 TABLET | Refills: 0 | Status: SHIPPED | OUTPATIENT
Start: 2021-05-10 | End: 2022-04-27 | Stop reason: ALTCHOICE

## 2021-05-10 RX ORDER — BLOOD-GLUCOSE METER
1 KIT MISCELLANEOUS EVERY 6 HOURS PRN
Qty: 1 EACH | Refills: 0 | Status: SHIPPED | OUTPATIENT
Start: 2021-05-10 | End: 2022-07-08 | Stop reason: SDUPTHER

## 2021-05-25 VITALS
WEIGHT: 182.8 LBS | WEIGHT: 189 LBS | BODY MASS INDEX: 29.45 KG/M2 | TEMPERATURE: 97.8 F | WEIGHT: 181.88 LBS | SYSTOLIC BLOOD PRESSURE: 157 MMHG | TEMPERATURE: 97.6 F | HEART RATE: 68 BPM | WEIGHT: 182.43 LBS | BODY MASS INDEX: 31.49 KG/M2 | BODY MASS INDEX: 29.44 KG/M2 | DIASTOLIC BLOOD PRESSURE: 70 MMHG | WEIGHT: 183.2 LBS | HEART RATE: 76 BPM | TEMPERATURE: 97.7 F | DIASTOLIC BLOOD PRESSURE: 94 MMHG | HEART RATE: 68 BPM | DIASTOLIC BLOOD PRESSURE: 103 MMHG | BODY MASS INDEX: 30.34 KG/M2 | DIASTOLIC BLOOD PRESSURE: 76 MMHG | BODY MASS INDEX: 29.36 KG/M2 | TEMPERATURE: 97 F | SYSTOLIC BLOOD PRESSURE: 166 MMHG | TEMPERATURE: 97.5 F | BODY MASS INDEX: 30.46 KG/M2 | HEIGHT: 67 IN | SYSTOLIC BLOOD PRESSURE: 128 MMHG | WEIGHT: 183 LBS | HEIGHT: 65 IN | WEIGHT: 182.32 LBS | SYSTOLIC BLOOD PRESSURE: 176 MMHG | DIASTOLIC BLOOD PRESSURE: 92 MMHG | SYSTOLIC BLOOD PRESSURE: 122 MMHG | SYSTOLIC BLOOD PRESSURE: 161 MMHG | RESPIRATION RATE: 18 BRPM | TEMPERATURE: 96.2 F | DIASTOLIC BLOOD PRESSURE: 101 MMHG | TEMPERATURE: 98 F | SYSTOLIC BLOOD PRESSURE: 168 MMHG | RESPIRATION RATE: 16 BRPM | HEIGHT: 65 IN | HEART RATE: 87 BPM | HEART RATE: 94 BPM | HEIGHT: 66 IN | HEART RATE: 66 BPM | BODY MASS INDEX: 28.72 KG/M2 | RESPIRATION RATE: 18 BRPM | HEART RATE: 77 BPM | DIASTOLIC BLOOD PRESSURE: 98 MMHG

## 2021-06-03 ENCOUNTER — DOCUMENTATION (OUTPATIENT)
Dept: GERIATRIC MEDICINE | Age: 56
End: 2021-06-03

## 2021-06-03 PROBLEM — M25.511 PAIN IN JOINT OF RIGHT SHOULDER: Status: ACTIVE | Noted: 2021-06-03

## 2021-06-04 ENCOUNTER — DOCUMENTATION (OUTPATIENT)
Dept: GERIATRIC MEDICINE | Age: 56
End: 2021-06-04

## 2021-07-09 ENCOUNTER — DOCUMENTATION (OUTPATIENT)
Dept: GERIATRIC MEDICINE | Age: 56
End: 2021-07-09

## 2021-07-09 PROBLEM — N52.9 ERECTILE DYSFUNCTION: Status: RESOLVED | Noted: 2017-12-05 | Resolved: 2021-07-09

## 2021-07-14 ENCOUNTER — LAB SERVICES (OUTPATIENT)
Dept: LAB | Age: 56
End: 2021-07-14

## 2021-07-14 ENCOUNTER — TELEPHONE (OUTPATIENT)
Dept: HEMATOLOGY/ONCOLOGY | Age: 56
End: 2021-07-14

## 2021-07-14 ENCOUNTER — OFFICE VISIT (OUTPATIENT)
Dept: HEMATOLOGY/ONCOLOGY | Age: 56
End: 2021-07-14
Attending: INTERNAL MEDICINE

## 2021-07-14 VITALS
BODY MASS INDEX: 30.73 KG/M2 | WEIGHT: 190.37 LBS | TEMPERATURE: 97.2 F | DIASTOLIC BLOOD PRESSURE: 93 MMHG | SYSTOLIC BLOOD PRESSURE: 157 MMHG | HEART RATE: 79 BPM | RESPIRATION RATE: 18 BRPM

## 2021-07-14 DIAGNOSIS — C82.03 FOLLICULAR LYMPHOMA GRADE I OF INTRA-ABDOMINAL LYMPH NODES (CMD): Primary | ICD-10-CM

## 2021-07-14 DIAGNOSIS — C82.03 FOLLICULAR LYMPHOMA GRADE I OF INTRA-ABDOMINAL LYMPH NODES (CMD): ICD-10-CM

## 2021-07-14 LAB
ALBUMIN SERPL-MCNC: 4.1 G/DL (ref 3.6–5.1)
ALBUMIN/GLOB SERPL: 1.4 {RATIO} (ref 1–2.4)
ALP SERPL-CCNC: 117 UNITS/L (ref 45–117)
ALT SERPL-CCNC: 38 UNITS/L
ANION GAP SERPL CALC-SCNC: 11 MMOL/L (ref 10–20)
AST SERPL-CCNC: 23 UNITS/L
BASOPHILS # BLD: 0.1 K/MCL (ref 0–0.3)
BASOPHILS NFR BLD: 1 %
BILIRUB SERPL-MCNC: 1 MG/DL (ref 0.2–1)
BUN SERPL-MCNC: 9 MG/DL (ref 6–20)
BUN/CREAT SERPL: 12 (ref 7–25)
CALCIUM SERPL-MCNC: 9 MG/DL (ref 8.4–10.2)
CHLORIDE SERPL-SCNC: 106 MMOL/L (ref 98–107)
CO2 SERPL-SCNC: 25 MMOL/L (ref 21–32)
CREAT SERPL-MCNC: 0.74 MG/DL (ref 0.67–1.17)
DEPRECATED RDW RBC: 43.7 FL (ref 39–50)
EOSINOPHIL # BLD: 0.8 K/MCL (ref 0–0.5)
EOSINOPHIL NFR BLD: 10 %
ERYTHROCYTE [DISTWIDTH] IN BLOOD: 13.6 % (ref 11–15)
FASTING DURATION TIME PATIENT: 0 HOURS
GFR SERPLBLD BASED ON 1.73 SQ M-ARVRAT: >90 ML/MIN/1.73M2
GLOBULIN SER-MCNC: 3 G/DL (ref 2–4)
GLUCOSE SERPL-MCNC: 112 MG/DL (ref 65–99)
HCT VFR BLD CALC: 45.4 % (ref 39–51)
HGB BLD-MCNC: 15.1 G/DL (ref 13–17)
IMM GRANULOCYTES # BLD AUTO: 0 K/MCL (ref 0–0.2)
IMM GRANULOCYTES # BLD: 0 %
LDH SERPL L TO P-CCNC: 262 UNITS/L (ref 86–234)
LYMPHOCYTES # BLD: 1.3 K/MCL (ref 1–4)
LYMPHOCYTES NFR BLD: 16 %
MCH RBC QN AUTO: 29.2 PG (ref 26–34)
MCHC RBC AUTO-ENTMCNC: 33.3 G/DL (ref 32–36.5)
MCV RBC AUTO: 87.8 FL (ref 78–100)
MONOCYTES # BLD: 0.5 K/MCL (ref 0.3–0.9)
MONOCYTES NFR BLD: 7 %
NEUTROPHILS # BLD: 5.2 K/MCL (ref 1.8–7.7)
NEUTROPHILS NFR BLD: 66 %
NRBC BLD MANUAL-RTO: 0 /100 WBC
PLATELET # BLD AUTO: 192 K/MCL (ref 140–450)
POTASSIUM SERPL-SCNC: 3.8 MMOL/L (ref 3.4–5.1)
PROT SERPL-MCNC: 7.1 G/DL (ref 6.4–8.2)
RBC # BLD: 5.17 MIL/MCL (ref 4.5–5.9)
SODIUM SERPL-SCNC: 138 MMOL/L (ref 135–145)
WBC # BLD: 7.9 K/MCL (ref 4.2–11)

## 2021-07-14 PROCEDURE — 80053 COMPREHEN METABOLIC PANEL: CPT | Performed by: INTERNAL MEDICINE

## 2021-07-14 PROCEDURE — 85025 COMPLETE CBC W/AUTO DIFF WBC: CPT | Performed by: INTERNAL MEDICINE

## 2021-07-14 PROCEDURE — 83615 LACTATE (LD) (LDH) ENZYME: CPT | Performed by: INTERNAL MEDICINE

## 2021-07-14 PROCEDURE — 82784 ASSAY IGA/IGD/IGG/IGM EACH: CPT | Performed by: INTERNAL MEDICINE

## 2021-07-14 PROCEDURE — 99213 OFFICE O/P EST LOW 20 MIN: CPT | Performed by: INTERNAL MEDICINE

## 2021-07-14 PROCEDURE — 36415 COLL VENOUS BLD VENIPUNCTURE: CPT | Performed by: INTERNAL MEDICINE

## 2021-07-14 PROCEDURE — 99211 OFF/OP EST MAY X REQ PHY/QHP: CPT

## 2021-07-14 RX ORDER — METHYLPREDNISOLONE 4 MG/1
4 TABLET ORAL SEE ADMIN INSTRUCTIONS
Qty: 21 TABLET | Refills: 0 | Status: SHIPPED | OUTPATIENT
Start: 2021-07-14 | End: 2021-09-30 | Stop reason: SDUPTHER

## 2021-07-14 ASSESSMENT — PATIENT HEALTH QUESTIONNAIRE - PHQ9
2. FEELING DOWN, DEPRESSED OR HOPELESS: NOT AT ALL
SUM OF ALL RESPONSES TO PHQ9 QUESTIONS 1 AND 2: 0
CLINICAL INTERPRETATION OF PHQ9 SCORE: NO FURTHER SCREENING NEEDED
SUM OF ALL RESPONSES TO PHQ9 QUESTIONS 1 AND 2: 0
CLINICAL INTERPRETATION OF PHQ2 SCORE: NO FURTHER SCREENING NEEDED
1. LITTLE INTEREST OR PLEASURE IN DOING THINGS: NOT AT ALL

## 2021-07-14 ASSESSMENT — PAIN SCALES - GENERAL: PAINLEVEL: 6

## 2021-07-15 LAB
IGA SERPL-MCNC: 150 MG/DL (ref 82–453)
IGG SERPL-MCNC: 868 MG/DL (ref 751–1560)
IGM SERPL-MCNC: 32 MG/DL (ref 46–304)

## 2021-07-26 RX ORDER — METHYLPREDNISOLONE 4 MG/1
4 TABLET ORAL SEE ADMIN INSTRUCTIONS
Qty: 21 TABLET | Refills: 0 | OUTPATIENT
Start: 2021-07-26

## 2021-07-26 RX ORDER — TRIAMCINOLONE ACETONIDE 1 MG/G
CREAM TOPICAL
Qty: 30 G | OUTPATIENT
Start: 2021-07-26

## 2021-07-26 RX ORDER — PIMECROLIMUS 10 MG/G
CREAM TOPICAL
Qty: 30 G | OUTPATIENT
Start: 2021-07-26

## 2021-07-26 RX ORDER — DESONIDE 0.5 MG/G
CREAM TOPICAL
Qty: 30 G | OUTPATIENT
Start: 2021-07-26

## 2021-08-05 ENCOUNTER — TELEPHONE (OUTPATIENT)
Dept: SCHEDULING | Age: 56
End: 2021-08-05

## 2021-09-08 DIAGNOSIS — M51.36 BULGE OF LUMBAR DISC WITHOUT MYELOPATHY: ICD-10-CM

## 2021-09-08 RX ORDER — GABAPENTIN 600 MG/1
600 TABLET ORAL DAILY
Qty: 90 TABLET | Refills: 1 | Status: SHIPPED | OUTPATIENT
Start: 2021-09-08 | End: 2022-06-13

## 2021-09-10 RX ORDER — TRAMADOL HYDROCHLORIDE 50 MG/1
50 TABLET ORAL 2 TIMES DAILY
Qty: 120 TABLET | Refills: 0 | Status: SHIPPED | OUTPATIENT
Start: 2021-09-10 | End: 2021-12-02 | Stop reason: SDUPTHER

## 2021-09-23 RX ORDER — ROPINIROLE 1 MG/1
TABLET, FILM COATED ORAL
Qty: 90 TABLET | Refills: 1 | Status: SHIPPED | OUTPATIENT
Start: 2021-09-23 | End: 2022-01-26 | Stop reason: SDUPTHER

## 2021-09-29 DIAGNOSIS — G47.33 OBSTRUCTIVE SLEEP APNEA: ICD-10-CM

## 2021-09-29 DIAGNOSIS — J45.20 MILD INTERMITTENT ASTHMA WITHOUT COMPLICATION: ICD-10-CM

## 2021-09-29 RX ORDER — METHYLPREDNISOLONE 4 MG/1
4 TABLET ORAL SEE ADMIN INSTRUCTIONS
Qty: 21 TABLET | Refills: 0 | Status: CANCELLED | OUTPATIENT
Start: 2021-09-29

## 2021-09-29 RX ORDER — MODAFINIL 200 MG/1
200 TABLET ORAL DAILY
Qty: 90 TABLET | Refills: 1 | Status: SHIPPED | OUTPATIENT
Start: 2021-09-29 | End: 2022-07-05 | Stop reason: SDUPTHER

## 2021-09-29 RX ORDER — ALBUTEROL SULFATE 90 UG/1
2 AEROSOL, METERED RESPIRATORY (INHALATION) EVERY 4 HOURS PRN
Qty: 18 G | Refills: 1 | Status: SHIPPED | OUTPATIENT
Start: 2021-09-29 | End: 2021-12-06

## 2021-09-29 RX ORDER — DESONIDE 0.5 MG/G
CREAM TOPICAL
Qty: 30 G | Refills: 0 | Status: SHIPPED | OUTPATIENT
Start: 2021-09-29 | End: 2021-10-22

## 2021-09-30 DIAGNOSIS — C82.03 FOLLICULAR LYMPHOMA GRADE I OF INTRA-ABDOMINAL LYMPH NODES (CMD): Primary | ICD-10-CM

## 2021-09-30 RX ORDER — METHYLPREDNISOLONE 4 MG/1
4 TABLET ORAL SEE ADMIN INSTRUCTIONS
Qty: 21 TABLET | Refills: 3 | Status: SHIPPED | OUTPATIENT
Start: 2021-09-30 | End: 2022-03-22 | Stop reason: ALTCHOICE

## 2021-10-05 ENCOUNTER — HOSPITAL ENCOUNTER (OUTPATIENT)
Dept: PET IMAGING | Age: 56
Discharge: HOME | End: 2021-10-05
Attending: INTERNAL MEDICINE

## 2021-10-05 DIAGNOSIS — C82.03 FOLLICULAR LYMPHOMA GRADE I OF INTRA-ABDOMINAL LYMPH NODES (CMD): ICD-10-CM

## 2021-10-05 LAB — GLUCOSE BLDC GLUCOMTR-MCNC: 89 MG/DL (ref 70–99)

## 2021-10-05 PROCEDURE — 82962 GLUCOSE BLOOD TEST: CPT

## 2021-10-05 PROCEDURE — A9552 F18 FDG: HCPCS | Performed by: INTERNAL MEDICINE

## 2021-10-05 PROCEDURE — 10006150 HB RX 343: Performed by: INTERNAL MEDICINE

## 2021-10-05 PROCEDURE — 78815 PET IMAGE W/CT SKULL-THIGH: CPT

## 2021-10-05 RX ORDER — FLUDEOXYGLUCOSE F-18 300 MCI/ML
10.84 INJECTION INTRAVENOUS ONCE
Status: COMPLETED | OUTPATIENT
Start: 2021-10-05 | End: 2021-10-05

## 2021-10-05 RX ADMIN — FLUDEOXYGLUCOSE F-18 10.84 MILLICURIE: 300 INJECTION INTRAVENOUS at 10:02

## 2021-10-06 ENCOUNTER — TELEPHONE (OUTPATIENT)
Dept: SCHEDULING | Age: 56
End: 2021-10-06

## 2021-10-13 ENCOUNTER — OFFICE VISIT (OUTPATIENT)
Dept: HEMATOLOGY/ONCOLOGY | Age: 56
End: 2021-10-13
Attending: INTERNAL MEDICINE

## 2021-10-13 ENCOUNTER — LAB SERVICES (OUTPATIENT)
Dept: LAB | Age: 56
End: 2021-10-13

## 2021-10-13 VITALS
DIASTOLIC BLOOD PRESSURE: 101 MMHG | RESPIRATION RATE: 18 BRPM | HEART RATE: 70 BPM | BODY MASS INDEX: 29.34 KG/M2 | SYSTOLIC BLOOD PRESSURE: 178 MMHG | TEMPERATURE: 98 F | WEIGHT: 181.77 LBS

## 2021-10-13 DIAGNOSIS — C82.03 FOLLICULAR LYMPHOMA GRADE I OF INTRA-ABDOMINAL LYMPH NODES (CMD): ICD-10-CM

## 2021-10-13 DIAGNOSIS — C82.03 FOLLICULAR LYMPHOMA GRADE I OF INTRA-ABDOMINAL LYMPH NODES (CMD): Primary | ICD-10-CM

## 2021-10-13 LAB
ALBUMIN SERPL-MCNC: 4.2 G/DL (ref 3.6–5.1)
ALBUMIN/GLOB SERPL: 1.3 {RATIO} (ref 1–2.4)
ALP SERPL-CCNC: 124 UNITS/L (ref 45–117)
ALT SERPL-CCNC: 28 UNITS/L
ANION GAP SERPL CALC-SCNC: 6 MMOL/L (ref 10–20)
AST SERPL-CCNC: 17 UNITS/L
BASOPHILS # BLD: 0 K/MCL (ref 0–0.3)
BASOPHILS NFR BLD: 0 %
BILIRUB SERPL-MCNC: 1.2 MG/DL (ref 0.2–1)
BUN SERPL-MCNC: 11 MG/DL (ref 6–20)
BUN/CREAT SERPL: 14 (ref 7–25)
CALCIUM SERPL-MCNC: 9.2 MG/DL (ref 8.4–10.2)
CHLORIDE SERPL-SCNC: 104 MMOL/L (ref 98–107)
CO2 SERPL-SCNC: 31 MMOL/L (ref 21–32)
CREAT SERPL-MCNC: 0.79 MG/DL (ref 0.67–1.17)
DEPRECATED RDW RBC: 42.1 FL (ref 39–50)
EOSINOPHIL # BLD: 0.1 K/MCL (ref 0–0.5)
EOSINOPHIL NFR BLD: 1 %
ERYTHROCYTE [DISTWIDTH] IN BLOOD: 13.5 % (ref 11–15)
FASTING DURATION TIME PATIENT: 0 HOURS (ref 0–999)
GFR SERPLBLD BASED ON 1.73 SQ M-ARVRAT: >90 ML/MIN
GLOBULIN SER-MCNC: 3.3 G/DL (ref 2–4)
GLUCOSE SERPL-MCNC: 91 MG/DL (ref 70–99)
HCT VFR BLD CALC: 50 % (ref 39–51)
HGB BLD-MCNC: 16.8 G/DL (ref 13–17)
IGA SERPL-MCNC: 161 MG/DL (ref 82–453)
IGG SERPL-MCNC: 862 MG/DL (ref 751–1560)
IGM SERPL-MCNC: 38 MG/DL (ref 46–304)
IMM GRANULOCYTES # BLD AUTO: 0.1 K/MCL (ref 0–0.2)
IMM GRANULOCYTES # BLD: 0 %
LDH SERPL L TO P-CCNC: 197 UNITS/L (ref 86–234)
LYMPHOCYTES # BLD: 1.4 K/MCL (ref 1–4)
LYMPHOCYTES NFR BLD: 11 %
MCH RBC QN AUTO: 29.2 PG (ref 26–34)
MCHC RBC AUTO-ENTMCNC: 33.6 G/DL (ref 32–36.5)
MCV RBC AUTO: 87 FL (ref 78–100)
MONOCYTES # BLD: 0.8 K/MCL (ref 0.3–0.9)
MONOCYTES NFR BLD: 6 %
NEUTROPHILS # BLD: 10.6 K/MCL (ref 1.8–7.7)
NEUTROPHILS NFR BLD: 82 %
NRBC BLD MANUAL-RTO: 0 /100 WBC
PLATELET # BLD AUTO: 220 K/MCL (ref 140–450)
POTASSIUM SERPL-SCNC: 4 MMOL/L (ref 3.4–5.1)
PROT SERPL-MCNC: 7.5 G/DL (ref 6.4–8.2)
RBC # BLD: 5.75 MIL/MCL (ref 4.5–5.9)
SODIUM SERPL-SCNC: 137 MMOL/L (ref 135–145)
WBC # BLD: 12.8 K/MCL (ref 4.2–11)

## 2021-10-13 PROCEDURE — 82232 ASSAY OF BETA-2 PROTEIN: CPT | Performed by: INTERNAL MEDICINE

## 2021-10-13 PROCEDURE — 99213 OFFICE O/P EST LOW 20 MIN: CPT | Performed by: INTERNAL MEDICINE

## 2021-10-13 PROCEDURE — 82784 ASSAY IGA/IGD/IGG/IGM EACH: CPT | Performed by: INTERNAL MEDICINE

## 2021-10-13 PROCEDURE — 80053 COMPREHEN METABOLIC PANEL: CPT | Performed by: INTERNAL MEDICINE

## 2021-10-13 PROCEDURE — 36415 COLL VENOUS BLD VENIPUNCTURE: CPT | Performed by: INTERNAL MEDICINE

## 2021-10-13 PROCEDURE — 83615 LACTATE (LD) (LDH) ENZYME: CPT | Performed by: INTERNAL MEDICINE

## 2021-10-13 PROCEDURE — 85025 COMPLETE CBC W/AUTO DIFF WBC: CPT | Performed by: INTERNAL MEDICINE

## 2021-10-13 PROCEDURE — 99211 OFF/OP EST MAY X REQ PHY/QHP: CPT

## 2021-10-13 ASSESSMENT — PAIN SCALES - GENERAL: PAINLEVEL: 4

## 2021-10-14 LAB — B2 MICROGLOB SERPL-MCNC: 1.5 MG/L (ref 1.2–3.5)

## 2021-10-18 ENCOUNTER — TELEPHONE (OUTPATIENT)
Dept: SCHEDULING | Age: 56
End: 2021-10-18

## 2021-10-20 ENCOUNTER — TELEPHONE (OUTPATIENT)
Dept: SCHEDULING | Age: 56
End: 2021-10-20

## 2021-10-20 ENCOUNTER — TELEPHONE (OUTPATIENT)
Dept: HEMATOLOGY/ONCOLOGY | Age: 56
End: 2021-10-20

## 2021-10-22 ENCOUNTER — TELEPHONE (OUTPATIENT)
Dept: SCHEDULING | Age: 56
End: 2021-10-22

## 2021-10-22 RX ORDER — DESONIDE 0.5 MG/G
CREAM TOPICAL
Qty: 30 G | Refills: 0 | Status: SHIPPED | OUTPATIENT
Start: 2021-10-22 | End: 2022-01-25

## 2021-12-02 DIAGNOSIS — M51.36 BULGE OF LUMBAR DISC WITHOUT MYELOPATHY: ICD-10-CM

## 2021-12-02 RX ORDER — TRAMADOL HYDROCHLORIDE 50 MG/1
50 TABLET ORAL 2 TIMES DAILY
Qty: 120 TABLET | Refills: 0 | Status: SHIPPED | OUTPATIENT
Start: 2021-12-02 | End: 2022-01-28 | Stop reason: DRUGHIGH

## 2021-12-05 DIAGNOSIS — J45.20 MILD INTERMITTENT ASTHMA WITHOUT COMPLICATION: ICD-10-CM

## 2021-12-06 RX ORDER — ALBUTEROL SULFATE 90 UG/1
AEROSOL, METERED RESPIRATORY (INHALATION)
Qty: 8.5 EACH | Refills: 1 | Status: SHIPPED | OUTPATIENT
Start: 2021-12-06 | End: 2022-05-09

## 2021-12-17 ENCOUNTER — OFFICE VISIT (OUTPATIENT)
Dept: SLEEP MEDICINE | Age: 56
End: 2021-12-17
Attending: NURSE PRACTITIONER

## 2021-12-17 VITALS
TEMPERATURE: 96 F | HEIGHT: 66 IN | SYSTOLIC BLOOD PRESSURE: 156 MMHG | OXYGEN SATURATION: 99 % | RESPIRATION RATE: 20 BRPM | DIASTOLIC BLOOD PRESSURE: 95 MMHG | BODY MASS INDEX: 29.09 KG/M2 | HEART RATE: 80 BPM | WEIGHT: 181 LBS

## 2021-12-17 DIAGNOSIS — G47.33 OBSTRUCTIVE SLEEP APNEA: Primary | ICD-10-CM

## 2021-12-17 PROCEDURE — 99214 OFFICE O/P EST MOD 30 MIN: CPT

## 2021-12-17 PROCEDURE — 99214 OFFICE O/P EST MOD 30 MIN: CPT | Performed by: NURSE PRACTITIONER

## 2021-12-17 ASSESSMENT — ENCOUNTER SYMPTOMS
ALLERGIC/IMMUNOLOGIC NEGATIVE: 1
PSYCHIATRIC NEGATIVE: 1
HEMATOLOGIC/LYMPHATIC NEGATIVE: 1
GASTROINTESTINAL NEGATIVE: 1
NEUROLOGICAL NEGATIVE: 1
ENDOCRINE NEGATIVE: 1
CONSTITUTIONAL NEGATIVE: 1
RESPIRATORY NEGATIVE: 1
EYES NEGATIVE: 1

## 2021-12-23 ENCOUNTER — HOSPITAL ENCOUNTER (OUTPATIENT)
Dept: GENERAL RADIOLOGY | Age: 56
Discharge: HOME | End: 2021-12-23
Attending: NURSE PRACTITIONER

## 2021-12-23 ENCOUNTER — WALK IN (OUTPATIENT)
Dept: URGENT CARE | Age: 56
End: 2021-12-23
Attending: EMERGENCY MEDICINE

## 2021-12-23 VITALS
HEIGHT: 66 IN | SYSTOLIC BLOOD PRESSURE: 174 MMHG | RESPIRATION RATE: 16 BRPM | OXYGEN SATURATION: 99 % | DIASTOLIC BLOOD PRESSURE: 112 MMHG | TEMPERATURE: 98.6 F | WEIGHT: 178 LBS | BODY MASS INDEX: 28.61 KG/M2 | HEART RATE: 71 BPM

## 2021-12-23 DIAGNOSIS — M25.522 LEFT ELBOW PAIN: ICD-10-CM

## 2021-12-23 DIAGNOSIS — S50.02XA CONTUSION OF LEFT ELBOW, INITIAL ENCOUNTER: Primary | ICD-10-CM

## 2021-12-23 DIAGNOSIS — M70.22 OLECRANON BURSITIS OF LEFT ELBOW: ICD-10-CM

## 2021-12-23 PROCEDURE — 73080 X-RAY EXAM OF ELBOW: CPT

## 2021-12-23 PROCEDURE — 99203 OFFICE O/P NEW LOW 30 MIN: CPT

## 2021-12-29 ENCOUNTER — TELEPHONE (OUTPATIENT)
Dept: SCHEDULING | Age: 56
End: 2021-12-29

## 2021-12-29 ENCOUNTER — WALK IN (OUTPATIENT)
Dept: URGENT CARE | Age: 56
End: 2021-12-29
Attending: EMERGENCY MEDICINE

## 2021-12-29 VITALS
RESPIRATION RATE: 14 BRPM | OXYGEN SATURATION: 99 % | SYSTOLIC BLOOD PRESSURE: 164 MMHG | DIASTOLIC BLOOD PRESSURE: 98 MMHG | HEIGHT: 66 IN | BODY MASS INDEX: 28.77 KG/M2 | WEIGHT: 179 LBS | TEMPERATURE: 97.7 F | HEART RATE: 76 BPM

## 2021-12-29 DIAGNOSIS — M70.22 OLECRANON BURSITIS OF LEFT ELBOW: ICD-10-CM

## 2021-12-29 DIAGNOSIS — M25.522 LEFT ELBOW PAIN: Primary | ICD-10-CM

## 2021-12-29 PROCEDURE — 10002803 HB RX 637: Performed by: NURSE PRACTITIONER

## 2021-12-29 PROCEDURE — 99213 OFFICE O/P EST LOW 20 MIN: CPT

## 2021-12-29 RX ORDER — IBUPROFEN 200 MG
400 TABLET ORAL ONCE
Status: COMPLETED | OUTPATIENT
Start: 2021-12-29 | End: 2021-12-29

## 2021-12-29 RX ADMIN — IBUPROFEN 400 MG: 200 TABLET, FILM COATED ORAL at 18:20

## 2022-01-01 ENCOUNTER — EXTERNAL RECORD (OUTPATIENT)
Dept: HEMATOLOGY/ONCOLOGY | Facility: HOSPITAL | Age: 57
End: 2022-01-01

## 2022-01-03 ENCOUNTER — OFFICE VISIT (OUTPATIENT)
Dept: SLEEP MEDICINE | Age: 57
End: 2022-01-03
Attending: SPECIALIST

## 2022-01-03 DIAGNOSIS — G47.33 OBSTRUCTIVE SLEEP APNEA: ICD-10-CM

## 2022-01-03 PROCEDURE — G0399 HOME SLEEP TEST/TYPE 3 PORTA: HCPCS | Performed by: SPECIALIST

## 2022-01-03 PROCEDURE — 95800 SLP STDY UNATTENDED: CPT | Performed by: SPECIALIST

## 2022-01-06 ENCOUNTER — TELEPHONE (OUTPATIENT)
Dept: SLEEP MEDICINE | Age: 57
End: 2022-01-06

## 2022-01-06 DIAGNOSIS — G47.33 OBSTRUCTIVE SLEEP APNEA (ADULT) (PEDIATRIC): Primary | ICD-10-CM

## 2022-01-10 ENCOUNTER — TELEPHONE (OUTPATIENT)
Dept: SLEEP MEDICINE | Age: 57
End: 2022-01-10

## 2022-01-18 ENCOUNTER — LAB SERVICES (OUTPATIENT)
Dept: LAB | Age: 57
End: 2022-01-18

## 2022-01-18 ENCOUNTER — OFFICE VISIT (OUTPATIENT)
Dept: HEMATOLOGY/ONCOLOGY | Age: 57
End: 2022-01-18
Attending: INTERNAL MEDICINE

## 2022-01-18 VITALS
SYSTOLIC BLOOD PRESSURE: 168 MMHG | WEIGHT: 182.43 LBS | BODY MASS INDEX: 29.45 KG/M2 | DIASTOLIC BLOOD PRESSURE: 101 MMHG | RESPIRATION RATE: 18 BRPM | OXYGEN SATURATION: 100 % | TEMPERATURE: 98.8 F | HEART RATE: 79 BPM

## 2022-01-18 DIAGNOSIS — C82.03 FOLLICULAR LYMPHOMA GRADE I OF INTRA-ABDOMINAL LYMPH NODES (CMD): ICD-10-CM

## 2022-01-18 DIAGNOSIS — C82.03 FOLLICULAR LYMPHOMA GRADE I OF INTRA-ABDOMINAL LYMPH NODES (CMD): Primary | ICD-10-CM

## 2022-01-18 LAB
ALBUMIN SERPL-MCNC: 3.8 G/DL (ref 3.6–5.1)
ALBUMIN/GLOB SERPL: 1.1 {RATIO} (ref 1–2.4)
ALP SERPL-CCNC: 121 UNITS/L (ref 45–117)
ALT SERPL-CCNC: 29 UNITS/L
ANION GAP SERPL CALC-SCNC: 8 MMOL/L (ref 10–20)
AST SERPL-CCNC: 16 UNITS/L
BASOPHILS # BLD: 0.1 K/MCL (ref 0–0.3)
BASOPHILS NFR BLD: 1 %
BILIRUB SERPL-MCNC: 1.2 MG/DL (ref 0.2–1)
BUN SERPL-MCNC: 11 MG/DL (ref 6–20)
BUN/CREAT SERPL: 14 (ref 7–25)
CALCIUM SERPL-MCNC: 9.3 MG/DL (ref 8.4–10.2)
CHLORIDE SERPL-SCNC: 106 MMOL/L (ref 98–107)
CO2 SERPL-SCNC: 28 MMOL/L (ref 21–32)
CREAT SERPL-MCNC: 0.78 MG/DL (ref 0.67–1.17)
DEPRECATED RDW RBC: 42.3 FL (ref 39–50)
EOSINOPHIL # BLD: 1.2 K/MCL (ref 0–0.5)
EOSINOPHIL NFR BLD: 10 %
ERYTHROCYTE [DISTWIDTH] IN BLOOD: 13.2 % (ref 11–15)
FASTING DURATION TIME PATIENT: ABNORMAL H
GFR SERPLBLD BASED ON 1.73 SQ M-ARVRAT: >90 ML/MIN
GLOBULIN SER-MCNC: 3.5 G/DL (ref 2–4)
GLUCOSE SERPL-MCNC: 103 MG/DL (ref 70–99)
HCT VFR BLD CALC: 47.6 % (ref 39–51)
HGB BLD-MCNC: 16 G/DL (ref 13–17)
IMM GRANULOCYTES # BLD AUTO: 0.1 K/MCL (ref 0–0.2)
IMM GRANULOCYTES # BLD: 1 %
LYMPHOCYTES # BLD: 1.8 K/MCL (ref 1–4)
LYMPHOCYTES NFR BLD: 15 %
MCH RBC QN AUTO: 29.6 PG (ref 26–34)
MCHC RBC AUTO-ENTMCNC: 33.6 G/DL (ref 32–36.5)
MCV RBC AUTO: 88.1 FL (ref 78–100)
MONOCYTES # BLD: 0.8 K/MCL (ref 0.3–0.9)
MONOCYTES NFR BLD: 7 %
NEUTROPHILS # BLD: 8.1 K/MCL (ref 1.8–7.7)
NEUTROPHILS NFR BLD: 66 %
NRBC BLD MANUAL-RTO: 0 /100 WBC
PLATELET # BLD AUTO: 191 K/MCL (ref 140–450)
POTASSIUM SERPL-SCNC: 3.9 MMOL/L (ref 3.4–5.1)
PROT SERPL-MCNC: 7.3 G/DL (ref 6.4–8.2)
RBC # BLD: 5.4 MIL/MCL (ref 4.5–5.9)
SARS-COV-2 S IGG SERPL QL IA: 11.13
SARS-COV-2 S RBD NAB SERPL QL SVNT: POSITIVE
SODIUM SERPL-SCNC: 138 MMOL/L (ref 135–145)
WBC # BLD: 12.1 K/MCL (ref 4.2–11)

## 2022-01-18 PROCEDURE — 85025 COMPLETE CBC W/AUTO DIFF WBC: CPT | Performed by: PSYCHIATRY & NEUROLOGY

## 2022-01-18 PROCEDURE — 99211 OFF/OP EST MAY X REQ PHY/QHP: CPT

## 2022-01-18 PROCEDURE — 82784 ASSAY IGA/IGD/IGG/IGM EACH: CPT | Performed by: PSYCHIATRY & NEUROLOGY

## 2022-01-18 PROCEDURE — 99213 OFFICE O/P EST LOW 20 MIN: CPT | Performed by: INTERNAL MEDICINE

## 2022-01-18 PROCEDURE — 80053 COMPREHEN METABOLIC PANEL: CPT | Performed by: PSYCHIATRY & NEUROLOGY

## 2022-01-18 PROCEDURE — 36415 COLL VENOUS BLD VENIPUNCTURE: CPT | Performed by: PSYCHIATRY & NEUROLOGY

## 2022-01-18 PROCEDURE — 86769 SARS-COV-2 COVID-19 ANTIBODY: CPT | Performed by: PSYCHIATRY & NEUROLOGY

## 2022-01-18 ASSESSMENT — PAIN SCALES - GENERAL: PAINLEVEL: 7

## 2022-01-19 LAB
IGA SERPL-MCNC: 208 MG/DL (ref 82–453)
IGG SERPL-MCNC: 853 MG/DL (ref 751–1560)
IGM SERPL-MCNC: 42 MG/DL (ref 46–304)

## 2022-01-20 ENCOUNTER — APPOINTMENT (OUTPATIENT)
Dept: RHEUMATOLOGY | Age: 57
End: 2022-01-20

## 2022-01-20 ENCOUNTER — TELEPHONE (OUTPATIENT)
Dept: INTERNAL MEDICINE | Age: 57
End: 2022-01-20

## 2022-01-20 ENCOUNTER — DOCUMENTATION (OUTPATIENT)
Dept: GERIATRIC MEDICINE | Age: 57
End: 2022-01-20

## 2022-01-20 DIAGNOSIS — M25.569 KNEE PAIN, UNSPECIFIED CHRONICITY, UNSPECIFIED LATERALITY: Primary | ICD-10-CM

## 2022-01-20 PROBLEM — Z23 NEED FOR COVID-19 VACCINE: Status: ACTIVE | Noted: 2022-01-20

## 2022-01-20 PROBLEM — U07.1 COVID-19 VIRUS INFECTION: Status: ACTIVE | Noted: 2022-01-20

## 2022-01-25 RX ORDER — DESONIDE 0.5 MG/G
CREAM TOPICAL
Qty: 30 G | Refills: 0 | Status: SHIPPED | OUTPATIENT
Start: 2022-01-25 | End: 2022-04-27 | Stop reason: ALTCHOICE

## 2022-01-27 RX ORDER — CYCLOBENZAPRINE HCL 5 MG
5 TABLET ORAL 2 TIMES DAILY PRN
Qty: 60 TABLET | Refills: 2 | Status: SHIPPED | OUTPATIENT
Start: 2022-01-27 | End: 2022-07-26 | Stop reason: DRUGHIGH

## 2022-01-27 RX ORDER — ROPINIROLE 1 MG/1
1 TABLET, FILM COATED ORAL NIGHTLY
Qty: 90 TABLET | Refills: 1 | Status: SHIPPED | OUTPATIENT
Start: 2022-01-27 | End: 2022-07-07

## 2022-01-28 ENCOUNTER — OFFICE VISIT (OUTPATIENT)
Dept: RHEUMATOLOGY | Age: 57
End: 2022-01-28

## 2022-01-28 ENCOUNTER — TELEPHONE (OUTPATIENT)
Dept: SCHEDULING | Age: 57
End: 2022-01-28

## 2022-01-28 ENCOUNTER — DOCUMENTATION (OUTPATIENT)
Dept: GERIATRIC MEDICINE | Age: 57
End: 2022-01-28

## 2022-01-28 ENCOUNTER — LAB SERVICES (OUTPATIENT)
Dept: LAB | Age: 57
End: 2022-01-28

## 2022-01-28 VITALS
HEART RATE: 87 BPM | WEIGHT: 183 LBS | BODY MASS INDEX: 29.54 KG/M2 | DIASTOLIC BLOOD PRESSURE: 110 MMHG | TEMPERATURE: 97.7 F | SYSTOLIC BLOOD PRESSURE: 176 MMHG

## 2022-01-28 DIAGNOSIS — M25.561 CHRONIC PAIN OF BOTH KNEES: ICD-10-CM

## 2022-01-28 DIAGNOSIS — C82.03 FOLLICULAR LYMPHOMA GRADE I OF INTRA-ABDOMINAL LYMPH NODES (CMD): ICD-10-CM

## 2022-01-28 DIAGNOSIS — M25.562 CHRONIC PAIN OF BOTH KNEES: ICD-10-CM

## 2022-01-28 DIAGNOSIS — M51.36 BULGE OF LUMBAR DISC WITHOUT MYELOPATHY: ICD-10-CM

## 2022-01-28 DIAGNOSIS — M48.061 SPINAL STENOSIS OF LUMBAR REGION, UNSPECIFIED WHETHER NEUROGENIC CLAUDICATION PRESENT: ICD-10-CM

## 2022-01-28 DIAGNOSIS — G89.29 CHRONIC PAIN OF BOTH KNEES: ICD-10-CM

## 2022-01-28 DIAGNOSIS — M79.7 FIBROMYALGIA: Primary | ICD-10-CM

## 2022-01-28 LAB
ALBUMIN SERPL-MCNC: 4.2 G/DL (ref 3.6–5.1)
ALBUMIN/GLOB SERPL: 1.4 {RATIO} (ref 1–2.4)
ALP SERPL-CCNC: 130 UNITS/L (ref 45–117)
ALT SERPL-CCNC: 26 UNITS/L
ANION GAP SERPL CALC-SCNC: 8 MMOL/L (ref 10–20)
AST SERPL-CCNC: 19 UNITS/L
BASOPHILS # BLD: 0.1 K/MCL (ref 0–0.3)
BASOPHILS NFR BLD: 1 %
BILIRUB SERPL-MCNC: 0.7 MG/DL (ref 0.2–1)
BUN SERPL-MCNC: 11 MG/DL (ref 6–20)
BUN/CREAT SERPL: 13 (ref 7–25)
CALCIUM SERPL-MCNC: 10 MG/DL (ref 8.4–10.2)
CHLORIDE SERPL-SCNC: 104 MMOL/L (ref 98–107)
CO2 SERPL-SCNC: 32 MMOL/L (ref 21–32)
CREAT SERPL-MCNC: 0.88 MG/DL (ref 0.67–1.17)
DEPRECATED RDW RBC: 40.4 FL (ref 39–50)
EOSINOPHIL # BLD: 0.8 K/MCL (ref 0–0.5)
EOSINOPHIL NFR BLD: 10 %
ERYTHROCYTE [DISTWIDTH] IN BLOOD: 12.8 % (ref 11–15)
FASTING DURATION TIME PATIENT: 0 HOURS (ref 0–999)
GFR SERPLBLD BASED ON 1.73 SQ M-ARVRAT: >90 ML/MIN
GLOBULIN SER-MCNC: 3 G/DL (ref 2–4)
GLUCOSE SERPL-MCNC: 91 MG/DL (ref 70–99)
HCT VFR BLD CALC: 44.4 % (ref 39–51)
HGB BLD-MCNC: 14.9 G/DL (ref 13–17)
IMM GRANULOCYTES # BLD AUTO: 0 K/MCL (ref 0–0.2)
IMM GRANULOCYTES # BLD: 0 %
LYMPHOCYTES # BLD: 1.3 K/MCL (ref 1–4)
LYMPHOCYTES NFR BLD: 15 %
MCH RBC QN AUTO: 29.2 PG (ref 26–34)
MCHC RBC AUTO-ENTMCNC: 33.6 G/DL (ref 32–36.5)
MCV RBC AUTO: 87.1 FL (ref 78–100)
MONOCYTES # BLD: 0.6 K/MCL (ref 0.3–0.9)
MONOCYTES NFR BLD: 7 %
NEUTROPHILS # BLD: 5.7 K/MCL (ref 1.8–7.7)
NEUTROPHILS NFR BLD: 67 %
NRBC BLD MANUAL-RTO: 0 /100 WBC
PLATELET # BLD AUTO: 233 K/MCL (ref 140–450)
POTASSIUM SERPL-SCNC: 3.7 MMOL/L (ref 3.4–5.1)
PROT SERPL-MCNC: 7.2 G/DL (ref 6.4–8.2)
RBC # BLD: 5.1 MIL/MCL (ref 4.5–5.9)
SODIUM SERPL-SCNC: 140 MMOL/L (ref 135–145)
URATE SERPL-MCNC: 5.1 MG/DL (ref 3.5–7.2)
WBC # BLD: 8.5 K/MCL (ref 4.2–11)

## 2022-01-28 PROCEDURE — 80053 COMPREHEN METABOLIC PANEL: CPT | Performed by: PSYCHIATRY & NEUROLOGY

## 2022-01-28 PROCEDURE — 84550 ASSAY OF BLOOD/URIC ACID: CPT | Performed by: PSYCHIATRY & NEUROLOGY

## 2022-01-28 PROCEDURE — 82784 ASSAY IGA/IGD/IGG/IGM EACH: CPT | Performed by: PSYCHIATRY & NEUROLOGY

## 2022-01-28 PROCEDURE — 85025 COMPLETE CBC W/AUTO DIFF WBC: CPT | Performed by: PSYCHIATRY & NEUROLOGY

## 2022-01-28 PROCEDURE — 36415 COLL VENOUS BLD VENIPUNCTURE: CPT | Performed by: PSYCHIATRY & NEUROLOGY

## 2022-01-28 PROCEDURE — 99214 OFFICE O/P EST MOD 30 MIN: CPT | Performed by: INTERNAL MEDICINE

## 2022-01-28 RX ORDER — DULOXETIN HYDROCHLORIDE 30 MG/1
CAPSULE, DELAYED RELEASE ORAL
Qty: 165 CAPSULE | Refills: 0 | Status: SHIPPED | OUTPATIENT
Start: 2022-01-28 | End: 2022-03-01 | Stop reason: DRUGHIGH

## 2022-01-29 LAB
IGA SERPL-MCNC: 216 MG/DL (ref 82–453)
IGG SERPL-MCNC: 842 MG/DL (ref 751–1560)
IGM SERPL-MCNC: 37 MG/DL (ref 46–304)

## 2022-02-01 ENCOUNTER — DOCUMENTATION (OUTPATIENT)
Dept: GERIATRIC MEDICINE | Age: 57
End: 2022-02-01

## 2022-02-01 PROBLEM — Z23 FLU VACCINE NEED: Status: RESOLVED | Noted: 2018-10-24 | Resolved: 2022-02-01

## 2022-02-01 PROBLEM — M25.529 ELBOW PAIN: Status: ACTIVE | Noted: 2022-02-01

## 2022-02-15 ENCOUNTER — DOCUMENTATION (OUTPATIENT)
Dept: GERIATRIC MEDICINE | Age: 57
End: 2022-02-15

## 2022-02-15 ENCOUNTER — TELEPHONE (OUTPATIENT)
Dept: GERIATRIC MEDICINE | Age: 57
End: 2022-02-15

## 2022-02-15 DIAGNOSIS — D64.9 ANEMIA, UNSPECIFIED TYPE: Primary | ICD-10-CM

## 2022-02-16 ENCOUNTER — LAB SERVICES (OUTPATIENT)
Dept: LAB | Age: 57
End: 2022-02-16

## 2022-02-16 DIAGNOSIS — D64.9 ANEMIA, UNSPECIFIED TYPE: ICD-10-CM

## 2022-02-16 LAB
BASOPHILS # BLD: 0 K/MCL (ref 0–0.3)
BASOPHILS NFR BLD: 0 %
DEPRECATED RDW RBC: 42.5 FL (ref 39–50)
EOSINOPHIL # BLD: 0.1 K/MCL (ref 0–0.5)
EOSINOPHIL NFR BLD: 1 %
ERYTHROCYTE [DISTWIDTH] IN BLOOD: 13.3 % (ref 11–15)
HCT VFR BLD CALC: 49.2 % (ref 39–51)
HGB BLD-MCNC: 16.1 G/DL (ref 13–17)
IMM GRANULOCYTES # BLD AUTO: 0.1 K/MCL (ref 0–0.2)
IMM GRANULOCYTES # BLD: 1 %
LYMPHOCYTES # BLD: 2 K/MCL (ref 1–4)
LYMPHOCYTES NFR BLD: 13 %
MCH RBC QN AUTO: 28.8 PG (ref 26–34)
MCHC RBC AUTO-ENTMCNC: 32.7 G/DL (ref 32–36.5)
MCV RBC AUTO: 87.9 FL (ref 78–100)
MONOCYTES # BLD: 0.9 K/MCL (ref 0.3–0.9)
MONOCYTES NFR BLD: 6 %
NEUTROPHILS # BLD: 12 K/MCL (ref 1.8–7.7)
NEUTROPHILS NFR BLD: 79 %
NRBC BLD MANUAL-RTO: 0 /100 WBC
PLATELET # BLD AUTO: 233 K/MCL (ref 140–450)
RBC # BLD: 5.6 MIL/MCL (ref 4.5–5.9)
WBC # BLD: 15.1 K/MCL (ref 4.2–11)

## 2022-02-16 PROCEDURE — 36415 COLL VENOUS BLD VENIPUNCTURE: CPT | Performed by: PSYCHIATRY & NEUROLOGY

## 2022-02-16 PROCEDURE — 85025 COMPLETE CBC W/AUTO DIFF WBC: CPT | Performed by: PSYCHIATRY & NEUROLOGY

## 2022-02-17 ENCOUNTER — DOCUMENTATION (OUTPATIENT)
Dept: GERIATRIC MEDICINE | Age: 57
End: 2022-02-17

## 2022-02-17 PROBLEM — L30.9 DERMATITIS: Status: ACTIVE | Noted: 2022-02-17

## 2022-02-17 PROBLEM — D72.829 LEUKOCYTOSIS: Status: ACTIVE | Noted: 2022-02-17

## 2022-03-01 ENCOUNTER — TELEPHONE (OUTPATIENT)
Dept: SCHEDULING | Age: 57
End: 2022-03-01

## 2022-03-01 ENCOUNTER — NURSE TRIAGE (OUTPATIENT)
Dept: SCHEDULING | Age: 57
End: 2022-03-01

## 2022-03-01 DIAGNOSIS — M79.7 FIBROMYALGIA: Primary | ICD-10-CM

## 2022-03-01 RX ORDER — DULOXETIN HYDROCHLORIDE 30 MG/1
90 CAPSULE, DELAYED RELEASE ORAL DAILY
Qty: 90 CAPSULE | Refills: 0 | Status: SHIPPED | OUTPATIENT
Start: 2022-03-01 | End: 2022-03-22 | Stop reason: SDUPTHER

## 2022-03-02 ENCOUNTER — TELEPHONE (OUTPATIENT)
Dept: GERIATRIC MEDICINE | Age: 57
End: 2022-03-02

## 2022-03-02 ENCOUNTER — APPOINTMENT (OUTPATIENT)
Dept: INTERNAL MEDICINE | Age: 57
End: 2022-03-02

## 2022-03-02 ENCOUNTER — OFFICE VISIT (OUTPATIENT)
Dept: FAMILY MEDICINE | Age: 57
End: 2022-03-02

## 2022-03-02 DIAGNOSIS — M79.7 FIBROMYALGIA: Primary | ICD-10-CM

## 2022-03-02 PROCEDURE — 99213 OFFICE O/P EST LOW 20 MIN: CPT | Performed by: FAMILY MEDICINE

## 2022-03-02 RX ORDER — CLOBETASOL PROPIONATE 0.46 MG/ML
SOLUTION TOPICAL
COMMUNITY
Start: 2022-02-10 | End: 2022-07-26 | Stop reason: ALTCHOICE

## 2022-03-02 RX ORDER — KETOCONAZOLE 20 MG/ML
SHAMPOO TOPICAL
COMMUNITY
Start: 2022-02-10 | End: 2022-07-26 | Stop reason: ALTCHOICE

## 2022-03-07 ENCOUNTER — NURSE TRIAGE (OUTPATIENT)
Dept: SCHEDULING | Age: 57
End: 2022-03-07

## 2022-03-07 ENCOUNTER — TELEPHONE (OUTPATIENT)
Dept: SCHEDULING | Age: 57
End: 2022-03-07

## 2022-03-07 DIAGNOSIS — G89.4 CHRONIC PAIN SYNDROME: Primary | ICD-10-CM

## 2022-03-08 PROBLEM — G89.4 CHRONIC PAIN SYNDROME: Status: ACTIVE | Noted: 2022-03-08

## 2022-03-09 ENCOUNTER — TELEPHONE (OUTPATIENT)
Dept: SCHEDULING | Age: 57
End: 2022-03-09

## 2022-03-10 ENCOUNTER — TELEPHONE (OUTPATIENT)
Dept: SCHEDULING | Age: 57
End: 2022-03-10

## 2022-03-14 ENCOUNTER — DOCUMENTATION (OUTPATIENT)
Dept: GERIATRIC MEDICINE | Age: 57
End: 2022-03-14

## 2022-03-22 ENCOUNTER — TELEPHONE (OUTPATIENT)
Dept: RHEUMATOLOGY | Age: 57
End: 2022-03-22

## 2022-03-22 DIAGNOSIS — M79.7 FIBROMYALGIA: Primary | ICD-10-CM

## 2022-03-22 RX ORDER — DULOXETIN HYDROCHLORIDE 30 MG/1
90 CAPSULE, DELAYED RELEASE ORAL DAILY
Qty: 90 CAPSULE | Refills: 0 | Status: SHIPPED | OUTPATIENT
Start: 2022-03-22 | End: 2022-04-25

## 2022-03-23 ENCOUNTER — DOCUMENTATION (OUTPATIENT)
Dept: GERIATRIC MEDICINE | Age: 57
End: 2022-03-23

## 2022-03-23 ENCOUNTER — IMAGING SERVICES (OUTPATIENT)
Dept: GENERAL RADIOLOGY | Age: 57
End: 2022-03-23
Attending: INTERNAL MEDICINE

## 2022-03-23 ENCOUNTER — OFFICE VISIT (OUTPATIENT)
Dept: INTERNAL MEDICINE | Age: 57
End: 2022-03-23

## 2022-03-23 ENCOUNTER — LAB SERVICES (OUTPATIENT)
Dept: LAB | Age: 57
End: 2022-03-23

## 2022-03-23 VITALS
SYSTOLIC BLOOD PRESSURE: 132 MMHG | HEIGHT: 66 IN | WEIGHT: 176 LBS | TEMPERATURE: 97.4 F | DIASTOLIC BLOOD PRESSURE: 78 MMHG | HEART RATE: 90 BPM | BODY MASS INDEX: 28.28 KG/M2

## 2022-03-23 DIAGNOSIS — R41.9 COGNITIVE COMPLAINTS: ICD-10-CM

## 2022-03-23 DIAGNOSIS — M79.7 FIBROMYALGIA: ICD-10-CM

## 2022-03-23 DIAGNOSIS — D64.9 ANEMIA, UNSPECIFIED TYPE: ICD-10-CM

## 2022-03-23 DIAGNOSIS — N39.0 URINARY TRACT INFECTION WITHOUT HEMATURIA, SITE UNSPECIFIED: ICD-10-CM

## 2022-03-23 DIAGNOSIS — R50.9 FEVER, UNSPECIFIED FEVER CAUSE: ICD-10-CM

## 2022-03-23 DIAGNOSIS — D12.6 TUBULAR ADENOMA OF COLON: ICD-10-CM

## 2022-03-23 DIAGNOSIS — Z00.00 ENCOUNTER FOR GENERAL HEALTH EXAMINATION: ICD-10-CM

## 2022-03-23 DIAGNOSIS — R53.83 FATIGUE, UNSPECIFIED TYPE: ICD-10-CM

## 2022-03-23 DIAGNOSIS — G89.4 CHRONIC PAIN SYNDROME: ICD-10-CM

## 2022-03-23 DIAGNOSIS — C92.01 ACUTE MYELOBLASTIC LEUKEMIA IN REMISSION (CMD): ICD-10-CM

## 2022-03-23 DIAGNOSIS — C92.01 ACUTE MYELOID LEUKEMIA IN REMISSION (CMD): ICD-10-CM

## 2022-03-23 DIAGNOSIS — J45.20 MILD INTERMITTENT ASTHMA WITHOUT COMPLICATION: ICD-10-CM

## 2022-03-23 DIAGNOSIS — C82.03 FOLLICULAR LYMPHOMA GRADE I OF INTRA-ABDOMINAL LYMPH NODES (CMD): ICD-10-CM

## 2022-03-23 DIAGNOSIS — E66.3 OVERWEIGHT WITH BODY MASS INDEX (BMI) OF 28 TO 28.9 IN ADULT: ICD-10-CM

## 2022-03-23 DIAGNOSIS — L30.9 DERMATITIS: ICD-10-CM

## 2022-03-23 DIAGNOSIS — R41.89 COGNITIVE IMPAIRMENT: ICD-10-CM

## 2022-03-23 DIAGNOSIS — Z23 NEED FOR IMMUNIZATION AGAINST INFLUENZA: Primary | ICD-10-CM

## 2022-03-23 PROBLEM — H90.5 SENSORINEURAL HEARING LOSS: Status: ACTIVE | Noted: 2020-12-10

## 2022-03-23 LAB
ALBUMIN SERPL-MCNC: 4.2 G/DL (ref 3.6–5.1)
ALBUMIN/GLOB SERPL: 1.4 {RATIO} (ref 1–2.4)
ALP SERPL-CCNC: 124 UNITS/L (ref 45–117)
ALT SERPL-CCNC: 35 UNITS/L
ANION GAP SERPL CALC-SCNC: 9 MMOL/L (ref 10–20)
APPEARANCE UR: CLEAR
AST SERPL-CCNC: 17 UNITS/L
BACTERIA #/AREA URNS HPF: ABNORMAL /HPF
BASOPHILS # BLD: 0 K/MCL (ref 0–0.3)
BASOPHILS NFR BLD: 1 %
BILIRUB SERPL-MCNC: 1 MG/DL (ref 0.2–1)
BILIRUB UR QL STRIP: NEGATIVE
BUN SERPL-MCNC: 11 MG/DL (ref 6–20)
BUN/CREAT SERPL: 13 (ref 7–25)
CALCIUM SERPL-MCNC: 9.4 MG/DL (ref 8.4–10.2)
CHLORIDE SERPL-SCNC: 107 MMOL/L (ref 98–107)
CHOLEST SERPL-MCNC: 185 MG/DL
CHOLEST/HDLC SERPL: 4.7 {RATIO}
CO2 SERPL-SCNC: 29 MMOL/L (ref 21–32)
COLOR UR: ABNORMAL
CREAT SERPL-MCNC: 0.88 MG/DL (ref 0.67–1.17)
CRP SERPL-MCNC: 0.4 MG/DL
DEPRECATED RDW RBC: 43.4 FL (ref 39–50)
EOSINOPHIL # BLD: 0.3 K/MCL (ref 0–0.5)
EOSINOPHIL NFR BLD: 5 %
ERYTHROCYTE [DISTWIDTH] IN BLOOD: 13.4 % (ref 11–15)
ERYTHROCYTE [SEDIMENTATION RATE] IN BLOOD BY WESTERGREN METHOD: 23 MM/HR (ref 0–20)
FASTING DURATION TIME PATIENT: ABNORMAL H
FASTING DURATION TIME PATIENT: ABNORMAL H
GFR SERPLBLD BASED ON 1.73 SQ M-ARVRAT: >90 ML/MIN
GLOBULIN SER-MCNC: 3.1 G/DL (ref 2–4)
GLUCOSE SERPL-MCNC: 84 MG/DL (ref 70–99)
GLUCOSE UR STRIP-MCNC: NEGATIVE MG/DL
HBA1C MFR BLD: 5.5 % (ref 4.5–5.6)
HCT VFR BLD CALC: 50.3 % (ref 39–51)
HDLC SERPL-MCNC: 39 MG/DL
HGB BLD-MCNC: 16.5 G/DL (ref 13–17)
HGB UR QL STRIP: ABNORMAL
HYALINE CASTS #/AREA URNS LPF: ABNORMAL /LPF
IMM GRANULOCYTES # BLD AUTO: 0 K/MCL (ref 0–0.2)
IMM GRANULOCYTES # BLD: 0 %
KETONES UR STRIP-MCNC: ABNORMAL MG/DL
LDLC SERPL CALC-MCNC: 128 MG/DL
LEUKOCYTE ESTERASE UR QL STRIP: NEGATIVE
LYMPHOCYTES # BLD: 1.4 K/MCL (ref 1–4)
LYMPHOCYTES NFR BLD: 19 %
MCH RBC QN AUTO: 29.2 PG (ref 26–34)
MCHC RBC AUTO-ENTMCNC: 32.8 G/DL (ref 32–36.5)
MCV RBC AUTO: 88.9 FL (ref 78–100)
MONOCYTES # BLD: 0.4 K/MCL (ref 0.3–0.9)
MONOCYTES NFR BLD: 6 %
MUCOUS THREADS URNS QL MICRO: PRESENT
NEUTROPHILS # BLD: 4.9 K/MCL (ref 1.8–7.7)
NEUTROPHILS NFR BLD: 69 %
NITRITE UR QL STRIP: NEGATIVE
NONHDLC SERPL-MCNC: 146 MG/DL
NRBC BLD MANUAL-RTO: 0 /100 WBC
PH UR STRIP: 5 [PH] (ref 5–7)
PLATELET # BLD AUTO: 209 K/MCL (ref 140–450)
POTASSIUM SERPL-SCNC: 3.8 MMOL/L (ref 3.4–5.1)
PROT SERPL-MCNC: 7.3 G/DL (ref 6.4–8.2)
PROT UR STRIP-MCNC: 30 MG/DL
PSA SERPL-MCNC: 1.72 NG/ML
RBC # BLD: 5.66 MIL/MCL (ref 4.5–5.9)
RBC #/AREA URNS HPF: ABNORMAL /HPF
RHEUMATOID FACT SER NEPH-ACNC: <10 UNITS/ML
SODIUM SERPL-SCNC: 141 MMOL/L (ref 135–145)
SP GR UR STRIP: 1.03 (ref 1–1.03)
SQUAMOUS #/AREA URNS HPF: ABNORMAL /HPF
TRIGL SERPL-MCNC: 92 MG/DL
TSH SERPL-ACNC: 0.45 MCUNITS/ML (ref 0.35–5)
UROBILINOGEN UR STRIP-MCNC: 0.2 MG/DL
WBC # BLD: 7.1 K/MCL (ref 4.2–11)
WBC #/AREA URNS HPF: ABNORMAL /HPF

## 2022-03-23 PROCEDURE — 80050 GENERAL HEALTH PANEL: CPT | Performed by: INTERNAL MEDICINE

## 2022-03-23 PROCEDURE — 85652 RBC SED RATE AUTOMATED: CPT | Performed by: INTERNAL MEDICINE

## 2022-03-23 PROCEDURE — 80061 LIPID PANEL: CPT | Performed by: INTERNAL MEDICINE

## 2022-03-23 PROCEDURE — 86140 C-REACTIVE PROTEIN: CPT | Performed by: INTERNAL MEDICINE

## 2022-03-23 PROCEDURE — 84153 ASSAY OF PSA TOTAL: CPT | Performed by: INTERNAL MEDICINE

## 2022-03-23 PROCEDURE — 36415 COLL VENOUS BLD VENIPUNCTURE: CPT | Performed by: PSYCHIATRY & NEUROLOGY

## 2022-03-23 PROCEDURE — 86038 ANTINUCLEAR ANTIBODIES: CPT | Performed by: INTERNAL MEDICINE

## 2022-03-23 PROCEDURE — 86431 RHEUMATOID FACTOR QUANT: CPT | Performed by: INTERNAL MEDICINE

## 2022-03-23 PROCEDURE — 71046 X-RAY EXAM CHEST 2 VIEWS: CPT | Performed by: INTERNAL MEDICINE

## 2022-03-23 PROCEDURE — 99396 PREV VISIT EST AGE 40-64: CPT | Performed by: INTERNAL MEDICINE

## 2022-03-23 PROCEDURE — 81001 URINALYSIS AUTO W/SCOPE: CPT | Performed by: INTERNAL MEDICINE

## 2022-03-23 PROCEDURE — 99215 OFFICE O/P EST HI 40 MIN: CPT | Performed by: INTERNAL MEDICINE

## 2022-03-23 PROCEDURE — 83036 HEMOGLOBIN GLYCOSYLATED A1C: CPT | Performed by: INTERNAL MEDICINE

## 2022-03-23 PROCEDURE — 86200 CCP ANTIBODY: CPT | Performed by: INTERNAL MEDICINE

## 2022-03-23 RX ORDER — TAMSULOSIN HYDROCHLORIDE 0.4 MG/1
CAPSULE ORAL
Qty: 90 CAPSULE | Refills: 2 | Status: SHIPPED | OUTPATIENT
Start: 2022-03-23 | End: 2022-07-26 | Stop reason: DRUGHIGH

## 2022-03-23 RX ORDER — CEPHALEXIN 500 MG/1
CAPSULE ORAL
COMMUNITY
Start: 2022-03-14 | End: 2022-04-27 | Stop reason: ALTCHOICE

## 2022-03-23 ASSESSMENT — PATIENT HEALTH QUESTIONNAIRE - PHQ9
2. FEELING DOWN, DEPRESSED OR HOPELESS: SEVERAL DAYS
CLINICAL INTERPRETATION OF PHQ9 SCORE: MODERATE DEPRESSION
SUM OF ALL RESPONSES TO PHQ QUESTIONS 1-9: 10
1. LITTLE INTEREST OR PLEASURE IN DOING THINGS: NOT AT ALL
SUM OF ALL RESPONSES TO PHQ9 QUESTIONS 1 AND 2: 3
1. LITTLE INTEREST OR PLEASURE IN DOING THINGS: NOT AT ALL
6. FEELING BAD ABOUT YOURSELF - OR THAT YOU ARE A FAILURE OR HAVE LET YOURSELF OR YOUR FAMILY DOWN: SEVERAL DAYS
CLINICAL INTERPRETATION OF PHQ2 SCORE: NO FURTHER SCREENING NEEDED
2. FEELING DOWN, DEPRESSED OR HOPELESS: NOT AT ALL
5. POOR APPETITE, WEIGHT LOSS, OR OVEREATING: SEVERAL DAYS
SUM OF ALL RESPONSES TO PHQ9 QUESTIONS 1 AND 2: 0
8. MOVING OR SPEAKING SO SLOWLY THAT OTHER PEOPLE COULD HAVE NOTICED. OR THE OPPOSITE, BEING SO FIGETY OR RESTLESS THAT YOU HAVE BEEN MOVING AROUND A LOT MORE THAN USUAL: SEVERAL DAYS
SUM OF ALL RESPONSES TO PHQ9 QUESTIONS 1 AND 2: 3
SUM OF ALL RESPONSES TO PHQ9 QUESTIONS 1 AND 2: 0
2. FEELING DOWN, DEPRESSED OR HOPELESS: NOT AT ALL
CLINICAL INTERPRETATION OF PHQ2 SCORE: FURTHER SCREENING NEEDED
7. TROUBLE CONCENTRATING ON THINGS, SUCH AS READING THE NEWSPAPER OR WATCHING TELEVISION: SEVERAL DAYS
SUM OF ALL RESPONSES TO PHQ9 QUESTIONS 1 AND 2: 0
SUM OF ALL RESPONSES TO PHQ9 QUESTIONS 1 AND 2: 0
4. FEELING TIRED OR HAVING LITTLE ENERGY: NEARLY EVERY DAY
3. TROUBLE FALLING OR STAYING ASLEEP OR SLEEPING TOO MUCH: NOT AT ALL
10. IF YOU CHECKED OFF ANY PROBLEMS, HOW DIFFICULT HAVE THESE PROBLEMS MADE IT FOR YOU TO DO YOUR WORK, TAKE CARE OF THINGS AT HOME, OR GET ALONG WITH OTHER PEOPLE: EXTREMELY DIFFICULT
9. THOUGHTS THAT YOU WOULD BE BETTER OFF DEAD, OR OF HURTING YOURSELF: NOT AT ALL
CLINICAL INTERPRETATION OF PHQ2 SCORE: NO FURTHER SCREENING NEEDED
1. LITTLE INTEREST OR PLEASURE IN DOING THINGS: MORE THAN HALF THE DAYS

## 2022-03-23 ASSESSMENT — PAIN SCALES - GENERAL: PAINLEVEL: 8

## 2022-03-24 ENCOUNTER — TELEPHONE (OUTPATIENT)
Dept: HEMATOLOGY/ONCOLOGY | Age: 57
End: 2022-03-24

## 2022-03-24 ENCOUNTER — E-ADVICE (OUTPATIENT)
Dept: INTERNAL MEDICINE | Age: 57
End: 2022-03-24

## 2022-03-24 ENCOUNTER — DOCUMENTATION (OUTPATIENT)
Dept: GERIATRIC MEDICINE | Age: 57
End: 2022-03-24

## 2022-03-24 DIAGNOSIS — M54.59 INTRACTABLE LOW BACK PAIN: ICD-10-CM

## 2022-03-24 DIAGNOSIS — M48.061 SPINAL STENOSIS OF LUMBAR REGION, UNSPECIFIED WHETHER NEUROGENIC CLAUDICATION PRESENT: Primary | ICD-10-CM

## 2022-03-24 DIAGNOSIS — M54.50 BILATERAL LOW BACK PAIN WITHOUT SCIATICA, UNSPECIFIED CHRONICITY: ICD-10-CM

## 2022-03-24 PROBLEM — R31.21 ASYMPTOMATIC MICROSCOPIC HEMATURIA: Status: ACTIVE | Noted: 2022-03-24

## 2022-03-24 LAB
ANA SER QL IA: NEGATIVE
CCP AB SER IA-ACNC: 5 UNITS

## 2022-03-30 ENCOUNTER — DOCUMENTATION (OUTPATIENT)
Dept: GERIATRIC MEDICINE | Age: 57
End: 2022-03-30

## 2022-03-30 ENCOUNTER — OFFICE VISIT (OUTPATIENT)
Dept: PAIN MANAGEMENT | Age: 57
End: 2022-03-30
Attending: ANESTHESIOLOGY

## 2022-03-30 VITALS
TEMPERATURE: 97.3 F | DIASTOLIC BLOOD PRESSURE: 96 MMHG | HEIGHT: 65 IN | RESPIRATION RATE: 16 BRPM | SYSTOLIC BLOOD PRESSURE: 165 MMHG | HEART RATE: 78 BPM | BODY MASS INDEX: 28.82 KG/M2 | WEIGHT: 173 LBS

## 2022-03-30 DIAGNOSIS — M51.9 LUMBAR DISC DISEASE: ICD-10-CM

## 2022-03-30 DIAGNOSIS — M54.16 LUMBAR RADICULOPATHY: Primary | ICD-10-CM

## 2022-03-30 PROCEDURE — 99214 OFFICE O/P EST MOD 30 MIN: CPT | Performed by: ANESTHESIOLOGY

## 2022-03-30 ASSESSMENT — PAIN SCALES - GENERAL: PAINLEVEL: 7

## 2022-04-05 ENCOUNTER — TELEPHONE (OUTPATIENT)
Dept: INTERNAL MEDICINE | Age: 57
End: 2022-04-05

## 2022-04-05 ENCOUNTER — OFFICE VISIT (OUTPATIENT)
Dept: HEMATOLOGY/ONCOLOGY | Age: 57
End: 2022-04-05
Attending: INTERNAL MEDICINE

## 2022-04-05 VITALS
RESPIRATION RATE: 16 BRPM | TEMPERATURE: 97.9 F | HEART RATE: 83 BPM | BODY MASS INDEX: 30.34 KG/M2 | OXYGEN SATURATION: 98 % | DIASTOLIC BLOOD PRESSURE: 107 MMHG | SYSTOLIC BLOOD PRESSURE: 180 MMHG | WEIGHT: 182.32 LBS

## 2022-04-05 DIAGNOSIS — C82.03 FOLLICULAR LYMPHOMA GRADE I OF INTRA-ABDOMINAL LYMPH NODES (CMD): Primary | ICD-10-CM

## 2022-04-05 PROCEDURE — 99211 OFF/OP EST MAY X REQ PHY/QHP: CPT

## 2022-04-05 PROCEDURE — 99214 OFFICE O/P EST MOD 30 MIN: CPT | Performed by: INTERNAL MEDICINE

## 2022-04-05 ASSESSMENT — PAIN SCALES - GENERAL: PAINLEVEL: 9

## 2022-04-06 ENCOUNTER — APPOINTMENT (OUTPATIENT)
Dept: PAIN MANAGEMENT | Age: 57
End: 2022-04-06
Attending: ANESTHESIOLOGY

## 2022-04-20 ENCOUNTER — IMAGING SERVICES (OUTPATIENT)
Dept: PAIN MANAGEMENT | Age: 57
End: 2022-04-20
Attending: ANESTHESIOLOGY

## 2022-04-20 ENCOUNTER — OFFICE VISIT (OUTPATIENT)
Dept: PAIN MANAGEMENT | Age: 57
End: 2022-04-20
Attending: ANESTHESIOLOGY

## 2022-04-20 VITALS
BODY MASS INDEX: 29.66 KG/M2 | RESPIRATION RATE: 18 BRPM | DIASTOLIC BLOOD PRESSURE: 101 MMHG | OXYGEN SATURATION: 98 % | WEIGHT: 178 LBS | HEART RATE: 81 BPM | SYSTOLIC BLOOD PRESSURE: 155 MMHG | HEIGHT: 65 IN

## 2022-04-20 DIAGNOSIS — M54.16 LUMBAR RADICULOPATHY: Primary | ICD-10-CM

## 2022-04-20 DIAGNOSIS — M54.50 LOW BACK PAIN, UNSPECIFIED BACK PAIN LATERALITY, UNSPECIFIED CHRONICITY, UNSPECIFIED WHETHER SCIATICA PRESENT: ICD-10-CM

## 2022-04-20 PROCEDURE — 10002805 HB CONTRAST AGENT: Performed by: ANESTHESIOLOGY

## 2022-04-20 PROCEDURE — 10002801 HB RX 250 W/O HCPCS: Performed by: ANESTHESIOLOGY

## 2022-04-20 PROCEDURE — 64483 NJX AA&/STRD TFRM EPI L/S 1: CPT | Performed by: ANESTHESIOLOGY

## 2022-04-20 RX ORDER — METHYLPREDNISOLONE ACETATE 80 MG/ML
80 INJECTION, SUSPENSION INTRA-ARTICULAR; INTRALESIONAL; INTRAMUSCULAR; SOFT TISSUE ONCE
Status: COMPLETED | OUTPATIENT
Start: 2022-04-20 | End: 2022-04-20

## 2022-04-20 RX ORDER — LIDOCAINE HYDROCHLORIDE 10 MG/ML
3 INJECTION, SOLUTION EPIDURAL; INFILTRATION; INTRACAUDAL; PERINEURAL ONCE
Status: COMPLETED | OUTPATIENT
Start: 2022-04-20 | End: 2022-04-20

## 2022-04-20 RX ADMIN — LIDOCAINE HYDROCHLORIDE 30 MG: 10 INJECTION, SOLUTION EPIDURAL; INFILTRATION; INTRACAUDAL; PERINEURAL at 11:17

## 2022-04-20 RX ADMIN — IOHEXOL 2 ML: 180 INJECTION INTRAVENOUS at 11:23

## 2022-04-20 RX ADMIN — METHYLPREDNISOLONE ACETATE 80 MG: 80 INJECTION, SUSPENSION INTRA-ARTICULAR; INTRALESIONAL; INTRAMUSCULAR; SOFT TISSUE at 11:32

## 2022-04-20 ASSESSMENT — PAIN SCALES - GENERAL: PAINLEVEL: 7

## 2022-04-21 ENCOUNTER — TELEPHONE (OUTPATIENT)
Dept: PAIN MANAGEMENT | Age: 57
End: 2022-04-21

## 2022-04-22 ENCOUNTER — TELEPHONE (OUTPATIENT)
Dept: NEUROLOGY | Age: 57
End: 2022-04-22

## 2022-04-25 ENCOUNTER — HOSPITAL ENCOUNTER (OUTPATIENT)
Dept: MRI IMAGING | Age: 57
Discharge: HOME | End: 2022-04-25
Attending: INTERNAL MEDICINE

## 2022-04-25 DIAGNOSIS — C82.03 FOLLICULAR LYMPHOMA GRADE I OF INTRA-ABDOMINAL LYMPH NODES (CMD): ICD-10-CM

## 2022-04-25 DIAGNOSIS — M79.7 FIBROMYALGIA: ICD-10-CM

## 2022-04-25 PROCEDURE — 73721 MRI JNT OF LWR EXTRE W/O DYE: CPT

## 2022-04-25 PROCEDURE — G1004 CDSM NDSC: HCPCS

## 2022-04-25 RX ORDER — DULOXETIN HYDROCHLORIDE 30 MG/1
CAPSULE, DELAYED RELEASE ORAL
Qty: 270 CAPSULE | Refills: 0 | Status: SHIPPED | OUTPATIENT
Start: 2022-04-25 | End: 2022-06-13

## 2022-04-26 ENCOUNTER — TELEPHONE (OUTPATIENT)
Dept: SCHEDULING | Age: 57
End: 2022-04-26

## 2022-04-27 ENCOUNTER — HOSPITAL ENCOUNTER (OUTPATIENT)
Dept: MRI IMAGING | Age: 57
Discharge: HOME | End: 2022-04-27
Attending: INTERNAL MEDICINE

## 2022-04-27 DIAGNOSIS — C82.03 FOLLICULAR LYMPHOMA GRADE I OF INTRA-ABDOMINAL LYMPH NODES (CMD): ICD-10-CM

## 2022-04-27 PROCEDURE — 10002805 HB CONTRAST AGENT: Performed by: INTERNAL MEDICINE

## 2022-04-27 PROCEDURE — G1004 CDSM NDSC: HCPCS

## 2022-04-27 PROCEDURE — 72197 MRI PELVIS W/O & W/DYE: CPT

## 2022-04-27 PROCEDURE — A9577 INJ MULTIHANCE: HCPCS | Performed by: INTERNAL MEDICINE

## 2022-04-27 PROCEDURE — 70553 MRI BRAIN STEM W/O & W/DYE: CPT

## 2022-04-27 RX ADMIN — GADOBENATE DIMEGLUMINE 10 ML: 529 INJECTION, SOLUTION INTRAVENOUS at 09:44

## 2022-04-28 ENCOUNTER — OFFICE VISIT (OUTPATIENT)
Dept: NEUROLOGY | Age: 57
End: 2022-04-28
Attending: INTERNAL MEDICINE

## 2022-04-28 ENCOUNTER — LAB SERVICES (OUTPATIENT)
Dept: LAB | Age: 57
End: 2022-04-28

## 2022-04-28 ENCOUNTER — DOCUMENTATION (OUTPATIENT)
Dept: GERIATRIC MEDICINE | Age: 57
End: 2022-04-28

## 2022-04-28 VITALS
HEIGHT: 65 IN | DIASTOLIC BLOOD PRESSURE: 103 MMHG | HEART RATE: 79 BPM | RESPIRATION RATE: 16 BRPM | SYSTOLIC BLOOD PRESSURE: 174 MMHG | BODY MASS INDEX: 30.89 KG/M2 | WEIGHT: 185.41 LBS

## 2022-04-28 DIAGNOSIS — R06.83 SNORING: ICD-10-CM

## 2022-04-28 DIAGNOSIS — R41.3 MEMORY LOSS: ICD-10-CM

## 2022-04-28 DIAGNOSIS — R41.3 MEMORY LOSS: Primary | ICD-10-CM

## 2022-04-28 DIAGNOSIS — H91.90 HEARING LOSS, UNSPECIFIED HEARING LOSS TYPE, UNSPECIFIED LATERALITY: ICD-10-CM

## 2022-04-28 DIAGNOSIS — M79.7 FIBROMYALGIA: ICD-10-CM

## 2022-04-28 DIAGNOSIS — F32.A DEPRESSION, UNSPECIFIED DEPRESSION TYPE: ICD-10-CM

## 2022-04-28 DIAGNOSIS — G47.10 HYPERSOMNIA: ICD-10-CM

## 2022-04-28 LAB
FOLATE SERPL-MCNC: 21.7 NG/ML
VIT B12 SERPL-MCNC: 422 PG/ML (ref 211–911)

## 2022-04-28 PROCEDURE — 36415 COLL VENOUS BLD VENIPUNCTURE: CPT | Performed by: INTERNAL MEDICINE

## 2022-04-28 PROCEDURE — 82746 ASSAY OF FOLIC ACID SERUM: CPT | Performed by: INTERNAL MEDICINE

## 2022-04-28 PROCEDURE — 99205 OFFICE O/P NEW HI 60 MIN: CPT | Performed by: PSYCHIATRY & NEUROLOGY

## 2022-04-28 PROCEDURE — 82607 VITAMIN B-12: CPT | Performed by: INTERNAL MEDICINE

## 2022-04-28 ASSESSMENT — ENCOUNTER SYMPTOMS
SLEEP DISTURBANCE: 1
HEADACHES: 1
CONFUSION: 1
FATIGUE: 1
NERVOUS/ANXIOUS: 1
CHILLS: 0
NAUSEA: 1
FEVER: 0
HALLUCINATIONS: 0
SHORTNESS OF BREATH: 0

## 2022-04-28 ASSESSMENT — VISUAL ACUITY: VA_NORMAL: 1

## 2022-04-29 ENCOUNTER — E-ADVICE (OUTPATIENT)
Dept: INTERNAL MEDICINE | Age: 57
End: 2022-04-29

## 2022-05-07 PROBLEM — R19.00 INTRA-ABDOMINAL AND PELVIC SWELLING, MASS AND LUMP, UNSPECIFIED SITE: Status: ACTIVE | Noted: 2022-05-07

## 2022-05-08 DIAGNOSIS — J45.20 MILD INTERMITTENT ASTHMA WITHOUT COMPLICATION: ICD-10-CM

## 2022-05-09 RX ORDER — ALBUTEROL SULFATE 90 UG/1
AEROSOL, METERED RESPIRATORY (INHALATION)
Qty: 8.5 EACH | Refills: 1 | Status: SHIPPED | OUTPATIENT
Start: 2022-05-09 | End: 2022-07-07

## 2022-05-10 ENCOUNTER — OFFICE VISIT (OUTPATIENT)
Dept: HEMATOLOGY/ONCOLOGY | Age: 57
End: 2022-05-10
Attending: INTERNAL MEDICINE

## 2022-05-10 ENCOUNTER — MED INFO FORMS (OUTPATIENT)
Dept: HEMATOLOGY/ONCOLOGY | Age: 57
End: 2022-05-10

## 2022-05-10 VITALS
WEIGHT: 188.49 LBS | OXYGEN SATURATION: 99 % | DIASTOLIC BLOOD PRESSURE: 90 MMHG | TEMPERATURE: 97.7 F | HEART RATE: 83 BPM | BODY MASS INDEX: 31.37 KG/M2 | RESPIRATION RATE: 17 BRPM | SYSTOLIC BLOOD PRESSURE: 152 MMHG

## 2022-05-10 DIAGNOSIS — C82.03 FOLLICULAR LYMPHOMA GRADE I OF INTRA-ABDOMINAL LYMPH NODES (CMD): Primary | ICD-10-CM

## 2022-05-10 PROCEDURE — 99213 OFFICE O/P EST LOW 20 MIN: CPT | Performed by: INTERNAL MEDICINE

## 2022-05-10 PROCEDURE — 99211 OFF/OP EST MAY X REQ PHY/QHP: CPT

## 2022-05-10 ASSESSMENT — PAIN SCALES - GENERAL: PAINLEVEL: 5

## 2022-05-16 ENCOUNTER — APPOINTMENT (OUTPATIENT)
Dept: RHEUMATOLOGY | Age: 57
End: 2022-05-16

## 2022-05-17 ENCOUNTER — TELEPHONE (OUTPATIENT)
Dept: SCHEDULING | Age: 57
End: 2022-05-17

## 2022-05-25 ENCOUNTER — TELEPHONE (OUTPATIENT)
Dept: NEUROLOGY | Age: 57
End: 2022-05-25

## 2022-06-03 ENCOUNTER — OFFICE VISIT (OUTPATIENT)
Dept: NEUROLOGY | Age: 57
End: 2022-06-03

## 2022-06-03 ENCOUNTER — DOCUMENTATION (OUTPATIENT)
Dept: GERIATRIC MEDICINE | Age: 57
End: 2022-06-03

## 2022-06-03 VITALS
HEART RATE: 78 BPM | SYSTOLIC BLOOD PRESSURE: 153 MMHG | WEIGHT: 192.46 LBS | RESPIRATION RATE: 16 BRPM | DIASTOLIC BLOOD PRESSURE: 85 MMHG | BODY MASS INDEX: 32.07 KG/M2 | HEIGHT: 65 IN

## 2022-06-03 DIAGNOSIS — H91.90 HEARING LOSS, UNSPECIFIED HEARING LOSS TYPE, UNSPECIFIED LATERALITY: ICD-10-CM

## 2022-06-03 DIAGNOSIS — G47.30 SLEEP APNEA, UNSPECIFIED TYPE: ICD-10-CM

## 2022-06-03 DIAGNOSIS — M79.7 FIBROMYALGIA: ICD-10-CM

## 2022-06-03 DIAGNOSIS — R41.3 MEMORY LOSS: Primary | ICD-10-CM

## 2022-06-03 DIAGNOSIS — F32.A DEPRESSION, UNSPECIFIED DEPRESSION TYPE: ICD-10-CM

## 2022-06-03 PROCEDURE — 99215 OFFICE O/P EST HI 40 MIN: CPT | Performed by: PSYCHIATRY & NEUROLOGY

## 2022-06-09 ENCOUNTER — APPOINTMENT (OUTPATIENT)
Dept: INTERNAL MEDICINE | Age: 57
End: 2022-06-09

## 2022-06-11 ENCOUNTER — EXTERNAL RECORD (OUTPATIENT)
Dept: HEALTH INFORMATION MANAGEMENT | Facility: OTHER | Age: 57
End: 2022-06-11

## 2022-06-12 DIAGNOSIS — M79.7 FIBROMYALGIA: ICD-10-CM

## 2022-06-13 RX ORDER — DULOXETIN HYDROCHLORIDE 30 MG/1
CAPSULE, DELAYED RELEASE ORAL
Qty: 270 CAPSULE | Refills: 0 | Status: SHIPPED | OUTPATIENT
Start: 2022-06-13 | End: 2022-07-26 | Stop reason: DRUGHIGH

## 2022-06-13 RX ORDER — GABAPENTIN 600 MG/1
TABLET ORAL
Qty: 90 TABLET | Refills: 2 | Status: SHIPPED | OUTPATIENT
Start: 2022-06-13 | End: 2022-07-26 | Stop reason: ALTCHOICE

## 2022-06-20 ENCOUNTER — TELEPHONE (OUTPATIENT)
Dept: NEUROLOGY | Age: 57
End: 2022-06-20

## 2022-07-05 ENCOUNTER — TELEPHONE (OUTPATIENT)
Dept: GERIATRIC MEDICINE | Age: 57
End: 2022-07-05

## 2022-07-05 ENCOUNTER — DOCUMENTATION (OUTPATIENT)
Dept: GERIATRIC MEDICINE | Age: 57
End: 2022-07-05

## 2022-07-05 ENCOUNTER — OFFICE VISIT (OUTPATIENT)
Dept: SLEEP MEDICINE | Age: 57
End: 2022-07-05
Attending: PSYCHIATRY & NEUROLOGY

## 2022-07-05 VITALS
OXYGEN SATURATION: 98 % | SYSTOLIC BLOOD PRESSURE: 169 MMHG | TEMPERATURE: 99 F | HEART RATE: 74 BPM | RESPIRATION RATE: 16 BRPM | WEIGHT: 190 LBS | BODY MASS INDEX: 31.65 KG/M2 | DIASTOLIC BLOOD PRESSURE: 106 MMHG | HEIGHT: 65 IN

## 2022-07-05 DIAGNOSIS — G47.33 OSA (OBSTRUCTIVE SLEEP APNEA): Primary | ICD-10-CM

## 2022-07-05 DIAGNOSIS — E66.3 OVERWEIGHT WITH BODY MASS INDEX (BMI) OF 28 TO 28.9 IN ADULT: Primary | ICD-10-CM

## 2022-07-05 DIAGNOSIS — G89.4 CHRONIC PAIN SYNDROME: ICD-10-CM

## 2022-07-05 DIAGNOSIS — G25.81 RESTLESS LEGS SYNDROME: ICD-10-CM

## 2022-07-05 DIAGNOSIS — R53.82 CHRONIC FATIGUE: ICD-10-CM

## 2022-07-05 DIAGNOSIS — G47.33 OBSTRUCTIVE SLEEP APNEA: ICD-10-CM

## 2022-07-05 PROCEDURE — 99214 OFFICE O/P EST MOD 30 MIN: CPT | Performed by: SPECIALIST

## 2022-07-05 PROCEDURE — 99213 OFFICE O/P EST LOW 20 MIN: CPT | Performed by: SPECIALIST

## 2022-07-05 RX ORDER — MODAFINIL 200 MG/1
200 TABLET ORAL DAILY
Qty: 90 TABLET | Refills: 1 | Status: SHIPPED | OUTPATIENT
Start: 2022-07-05 | End: 2023-01-05 | Stop reason: DRUGHIGH

## 2022-07-05 ASSESSMENT — ENCOUNTER SYMPTOMS
HEMATOLOGIC/LYMPHATIC NEGATIVE: 1
RESPIRATORY NEGATIVE: 1
EYES NEGATIVE: 1
GASTROINTESTINAL NEGATIVE: 1
EXCESSIVE DAYTIME SLEEPINESS: 1
CONSTITUTIONAL NEGATIVE: 1
ENDOCRINE NEGATIVE: 1

## 2022-07-07 ENCOUNTER — APPOINTMENT (OUTPATIENT)
Dept: RHEUMATOLOGY | Age: 57
End: 2022-07-07

## 2022-07-07 DIAGNOSIS — J45.20 MILD INTERMITTENT ASTHMA WITHOUT COMPLICATION: ICD-10-CM

## 2022-07-07 RX ORDER — ROPINIROLE 1 MG/1
1 TABLET, FILM COATED ORAL NIGHTLY
Qty: 90 TABLET | Refills: 1 | Status: SHIPPED | OUTPATIENT
Start: 2022-07-07 | End: 2023-01-05 | Stop reason: DRUGHIGH

## 2022-07-07 RX ORDER — ALBUTEROL SULFATE 90 UG/1
AEROSOL, METERED RESPIRATORY (INHALATION)
Qty: 8.5 EACH | Refills: 1 | Status: SHIPPED | OUTPATIENT
Start: 2022-07-07 | End: 2022-07-26 | Stop reason: DRUGHIGH

## 2022-07-08 DIAGNOSIS — J45.21 INTERMITTENT ASTHMA WITH ACUTE EXACERBATION, UNSPECIFIED ASTHMA SEVERITY: ICD-10-CM

## 2022-07-08 RX ORDER — TRIAMCINOLONE ACETONIDE 1 MG/G
CREAM TOPICAL
Qty: 30 G | OUTPATIENT
Start: 2022-07-08

## 2022-07-08 RX ORDER — BLOOD-GLUCOSE METER
1 KIT MISCELLANEOUS EVERY 6 HOURS PRN
Qty: 1 EACH | Refills: 0 | Status: SHIPPED | OUTPATIENT
Start: 2022-07-08

## 2022-07-11 ENCOUNTER — TELEPHONE (OUTPATIENT)
Dept: GERIATRIC MEDICINE | Age: 57
End: 2022-07-11

## 2022-07-11 DIAGNOSIS — J45.20 MILD INTERMITTENT ASTHMA WITHOUT COMPLICATION: Primary | ICD-10-CM

## 2022-07-11 RX ORDER — TRIAMCINOLONE ACETONIDE 1 MG/G
CREAM TOPICAL
Qty: 30 G | OUTPATIENT
Start: 2022-07-11

## 2022-07-12 ENCOUNTER — DOCUMENTATION (OUTPATIENT)
Dept: GERIATRIC MEDICINE | Age: 57
End: 2022-07-12

## 2022-07-12 PROBLEM — M25.519 SHOULDER JOINT PAIN: Status: ACTIVE | Noted: 2021-06-03

## 2022-07-20 ENCOUNTER — EXTERNAL RECORD (OUTPATIENT)
Dept: HEALTH INFORMATION MANAGEMENT | Facility: OTHER | Age: 57
End: 2022-07-20

## 2022-07-25 RX ORDER — TRIAMCINOLONE ACETONIDE 1 MG/G
CREAM TOPICAL 2 TIMES DAILY
Qty: 30 G | Refills: 0 | Status: SHIPPED | OUTPATIENT
Start: 2022-07-25

## 2022-07-26 ENCOUNTER — APPOINTMENT (OUTPATIENT)
Dept: GENERAL RADIOLOGY | Age: 57
End: 2022-07-26
Attending: STUDENT IN AN ORGANIZED HEALTH CARE EDUCATION/TRAINING PROGRAM

## 2022-07-26 ENCOUNTER — DOCUMENTATION (OUTPATIENT)
Dept: GERIATRIC MEDICINE | Age: 57
End: 2022-07-26

## 2022-07-26 ENCOUNTER — HOSPITAL ENCOUNTER (OUTPATIENT)
Age: 57
Setting detail: OBSERVATION
LOS: 1 days | Discharge: HOME | End: 2022-07-27
Attending: STUDENT IN AN ORGANIZED HEALTH CARE EDUCATION/TRAINING PROGRAM | Admitting: INTERNAL MEDICINE

## 2022-07-26 DIAGNOSIS — R07.9 CHEST PAIN, UNSPECIFIED TYPE: Primary | ICD-10-CM

## 2022-07-26 DIAGNOSIS — R07.9 CHEST PAIN, UNSPECIFIED TYPE: ICD-10-CM

## 2022-07-26 LAB
ALBUMIN SERPL-MCNC: 3.4 G/DL (ref 3.6–5.1)
ALBUMIN/GLOB SERPL: 1 {RATIO} (ref 1–2.4)
ALP SERPL-CCNC: 90 UNITS/L (ref 45–117)
ALT SERPL-CCNC: 33 UNITS/L
ANION GAP SERPL CALC-SCNC: 8 MMOL/L (ref 7–19)
AST SERPL-CCNC: 16 UNITS/L
BASOPHILS # BLD: 0 K/MCL (ref 0–0.3)
BASOPHILS NFR BLD: 0 %
BILIRUB SERPL-MCNC: 1.3 MG/DL (ref 0.2–1)
BUN SERPL-MCNC: 12 MG/DL (ref 6–20)
BUN/CREAT SERPL: 14 (ref 7–25)
CALCIUM SERPL-MCNC: 9.3 MG/DL (ref 8.4–10.2)
CHLORIDE SERPL-SCNC: 105 MMOL/L (ref 97–110)
CO2 SERPL-SCNC: 30 MMOL/L (ref 21–32)
CREAT SERPL-MCNC: 0.88 MG/DL (ref 0.67–1.17)
D DIMER PPP FEU-MCNC: <0.19 MG/L (FEU)
DEPRECATED RDW RBC: 45.2 FL (ref 39–50)
EOSINOPHIL # BLD: 0.2 K/MCL (ref 0–0.5)
EOSINOPHIL NFR BLD: 1 %
ERYTHROCYTE [DISTWIDTH] IN BLOOD: 14 % (ref 11–15)
FASTING DURATION TIME PATIENT: ABNORMAL H
FLUAV RNA RESP QL NAA+PROBE: NOT DETECTED
FLUBV RNA RESP QL NAA+PROBE: NOT DETECTED
GFR SERPLBLD BASED ON 1.73 SQ M-ARVRAT: >90 ML/MIN
GLOBULIN SER-MCNC: 3.5 G/DL (ref 2–4)
GLUCOSE SERPL-MCNC: 102 MG/DL (ref 70–99)
HCT VFR BLD CALC: 46.9 % (ref 39–51)
HGB BLD-MCNC: 15.4 G/DL (ref 13–17)
IMM GRANULOCYTES # BLD AUTO: 0.2 K/MCL (ref 0–0.2)
IMM GRANULOCYTES # BLD: 1 %
LYMPHOCYTES # BLD: 1.2 K/MCL (ref 1–4)
LYMPHOCYTES NFR BLD: 8 %
MCH RBC QN AUTO: 29.3 PG (ref 26–34)
MCHC RBC AUTO-ENTMCNC: 32.8 G/DL (ref 32–36.5)
MCV RBC AUTO: 89.3 FL (ref 78–100)
MONOCYTES # BLD: 1.2 K/MCL (ref 0.3–0.9)
MONOCYTES NFR BLD: 7 %
NEUTROPHILS # BLD: 13.2 K/MCL (ref 1.8–7.7)
NEUTROPHILS NFR BLD: 83 %
NRBC BLD MANUAL-RTO: 0 /100 WBC
PLATELET # BLD AUTO: 182 K/MCL (ref 140–450)
POTASSIUM SERPL-SCNC: 3.7 MMOL/L (ref 3.4–5.1)
PROT SERPL-MCNC: 6.9 G/DL (ref 6.4–8.2)
RBC # BLD: 5.25 MIL/MCL (ref 4.5–5.9)
RSV AG NPH QL IA.RAPID: NOT DETECTED
SARS-COV-2 RNA RESP QL NAA+PROBE: NOT DETECTED
SERVICE CMNT-IMP: NORMAL
SERVICE CMNT-IMP: NORMAL
SODIUM SERPL-SCNC: 139 MMOL/L (ref 135–145)
TROPONIN I SERPL DL<=0.01 NG/ML-MCNC: 11 NG/L
TROPONIN I SERPL DL<=0.01 NG/ML-MCNC: 11 NG/L
WBC # BLD: 15.9 K/MCL (ref 4.2–11)

## 2022-07-26 PROCEDURE — 10006031 HB ROOM CHARGE TELEMETRY

## 2022-07-26 PROCEDURE — 94660 CPAP INITIATION&MGMT: CPT

## 2022-07-26 PROCEDURE — 84484 ASSAY OF TROPONIN QUANT: CPT | Performed by: EMERGENCY MEDICINE

## 2022-07-26 PROCEDURE — 80053 COMPREHEN METABOLIC PANEL: CPT | Performed by: EMERGENCY MEDICINE

## 2022-07-26 PROCEDURE — C9803 HOPD COVID-19 SPEC COLLECT: HCPCS

## 2022-07-26 PROCEDURE — 71045 X-RAY EXAM CHEST 1 VIEW: CPT

## 2022-07-26 PROCEDURE — 93005 ELECTROCARDIOGRAM TRACING: CPT | Performed by: EMERGENCY MEDICINE

## 2022-07-26 PROCEDURE — 10004180 HB COUNTER-TRANSPORT

## 2022-07-26 PROCEDURE — 99285 EMERGENCY DEPT VISIT HI MDM: CPT

## 2022-07-26 PROCEDURE — 85025 COMPLETE CBC W/AUTO DIFF WBC: CPT | Performed by: EMERGENCY MEDICINE

## 2022-07-26 PROCEDURE — 36415 COLL VENOUS BLD VENIPUNCTURE: CPT

## 2022-07-26 PROCEDURE — 10004281 HB COUNTER-STAFF TIME PER 15 MIN

## 2022-07-26 PROCEDURE — 0241U COVID/FLU/RSV PANEL: CPT | Performed by: STUDENT IN AN ORGANIZED HEALTH CARE EDUCATION/TRAINING PROGRAM

## 2022-07-26 PROCEDURE — 85379 FIBRIN DEGRADATION QUANT: CPT | Performed by: STUDENT IN AN ORGANIZED HEALTH CARE EDUCATION/TRAINING PROGRAM

## 2022-07-26 PROCEDURE — 84484 ASSAY OF TROPONIN QUANT: CPT | Performed by: STUDENT IN AN ORGANIZED HEALTH CARE EDUCATION/TRAINING PROGRAM

## 2022-07-26 PROCEDURE — 93005 ELECTROCARDIOGRAM TRACING: CPT | Performed by: STUDENT IN AN ORGANIZED HEALTH CARE EDUCATION/TRAINING PROGRAM

## 2022-07-26 RX ORDER — CYCLOBENZAPRINE HCL 5 MG
10 TABLET ORAL NIGHTLY
Status: DISCONTINUED | OUTPATIENT
Start: 2022-07-27 | End: 2022-07-27 | Stop reason: HOSPADM

## 2022-07-26 RX ORDER — LORATADINE 10 MG/1
10 TABLET ORAL
Status: DISCONTINUED | OUTPATIENT
Start: 2022-07-27 | End: 2022-07-27 | Stop reason: HOSPADM

## 2022-07-26 RX ORDER — PREDNISONE 10 MG/1
30 TABLET ORAL DAILY
Status: DISCONTINUED | OUTPATIENT
Start: 2022-07-27 | End: 2022-07-27 | Stop reason: HOSPADM

## 2022-07-26 RX ORDER — CYCLOBENZAPRINE HCL 10 MG
15 TABLET ORAL NIGHTLY
COMMUNITY
End: 2023-05-18 | Stop reason: SDUPTHER

## 2022-07-26 RX ORDER — ALBUTEROL SULFATE 90 UG/1
2 AEROSOL, METERED RESPIRATORY (INHALATION) EVERY 4 HOURS PRN
COMMUNITY
End: 2022-12-10 | Stop reason: SDUPTHER

## 2022-07-26 RX ORDER — MODAFINIL 100 MG/1
200 TABLET ORAL DAILY
Status: DISCONTINUED | OUTPATIENT
Start: 2022-07-27 | End: 2022-07-27 | Stop reason: HOSPADM

## 2022-07-26 RX ORDER — DULOXETIN HYDROCHLORIDE 30 MG/1
30 CAPSULE, DELAYED RELEASE ORAL 2 TIMES DAILY
COMMUNITY

## 2022-07-26 RX ORDER — DULOXETIN HYDROCHLORIDE 30 MG/1
30 CAPSULE, DELAYED RELEASE ORAL 2 TIMES DAILY
Status: DISCONTINUED | OUTPATIENT
Start: 2022-07-27 | End: 2022-07-27 | Stop reason: HOSPADM

## 2022-07-26 RX ORDER — ALBUTEROL SULFATE 90 UG/1
2 AEROSOL, METERED RESPIRATORY (INHALATION) EVERY 4 HOURS PRN
Status: DISCONTINUED | OUTPATIENT
Start: 2022-07-26 | End: 2022-07-27 | Stop reason: HOSPADM

## 2022-07-26 RX ORDER — 0.9 % SODIUM CHLORIDE 0.9 %
2 VIAL (ML) INJECTION EVERY 12 HOURS SCHEDULED
Status: DISCONTINUED | OUTPATIENT
Start: 2022-07-27 | End: 2022-07-27 | Stop reason: HOSPADM

## 2022-07-26 RX ORDER — GABAPENTIN 300 MG/1
600 CAPSULE ORAL NIGHTLY
Status: DISCONTINUED | OUTPATIENT
Start: 2022-07-27 | End: 2022-07-27 | Stop reason: HOSPADM

## 2022-07-26 RX ORDER — ROPINIROLE 1 MG/1
1 TABLET, FILM COATED ORAL NIGHTLY
Status: DISCONTINUED | OUTPATIENT
Start: 2022-07-27 | End: 2022-07-27 | Stop reason: HOSPADM

## 2022-07-26 RX ORDER — GABAPENTIN 600 MG/1
600 TABLET ORAL NIGHTLY
COMMUNITY
End: 2022-12-14 | Stop reason: SDUPTHER

## 2022-07-26 RX ORDER — PREDNISONE 10 MG/1
TABLET ORAL SEE ADMIN INSTRUCTIONS
COMMUNITY
Start: 2022-07-13 | End: 2022-10-14

## 2022-07-26 RX ORDER — ONDANSETRON 2 MG/ML
4 INJECTION INTRAMUSCULAR; INTRAVENOUS EVERY 6 HOURS PRN
Status: DISCONTINUED | OUTPATIENT
Start: 2022-07-26 | End: 2022-07-27 | Stop reason: HOSPADM

## 2022-07-26 RX ORDER — ENOXAPARIN SODIUM 100 MG/ML
40 INJECTION SUBCUTANEOUS DAILY
Status: DISCONTINUED | OUTPATIENT
Start: 2022-07-27 | End: 2022-07-27 | Stop reason: HOSPADM

## 2022-07-26 RX ORDER — ACETAMINOPHEN 325 MG/1
650 TABLET ORAL EVERY 6 HOURS PRN
Status: DISCONTINUED | OUTPATIENT
Start: 2022-07-26 | End: 2022-07-27 | Stop reason: HOSPADM

## 2022-07-26 ASSESSMENT — PAIN SCALES - GENERAL
PAINLEVEL_OUTOF10: 7
PAINLEVEL_OUTOF10: 7
PAINLEVEL_OUTOF10: 4

## 2022-07-26 ASSESSMENT — ACTIVITIES OF DAILY LIVING (ADL)
ADL_SCORE: 12
CHRONIC_PAIN_PRESENT: YES, CANCER;YES, CHRONIC
ADL_BEFORE_ADMISSION: INDEPENDENT
ADL_SHORT_OF_BREATH: NO
RECENT_DECLINE_ADL: NO

## 2022-07-26 ASSESSMENT — LIFESTYLE VARIABLES
AUDIT-C TOTAL SCORE: 0
HOW OFTEN DO YOU HAVE A DRINK CONTAINING ALCOHOL: NEVER
ALCOHOL_USE_STATUS: NO OR LOW RISK WITH VALIDATED TOOL
HOW OFTEN DO YOU HAVE 6 OR MORE DRINKS ON ONE OCCASION: NEVER
HOW MANY STANDARD DRINKS CONTAINING ALCOHOL DO YOU HAVE ON A TYPICAL DAY: 0,1 OR 2

## 2022-07-26 ASSESSMENT — COGNITIVE AND FUNCTIONAL STATUS - GENERAL
DO YOU HAVE SERIOUS DIFFICULTY WALKING OR CLIMBING STAIRS: YES
ARE YOU DEAF OR DO YOU HAVE SERIOUS DIFFICULTY  HEARING: NO
BECAUSE OF A PHYSICAL, MENTAL, OR EMOTIONAL CONDITION, DO YOU HAVE DIFFICULTY DOING ERRANDS ALONE: YES
DO YOU HAVE DIFFICULTY DRESSING OR BATHING: YES
ARE YOU BLIND OR DO YOU HAVE SERIOUS DIFFICULTY SEEING, EVEN WHEN WEARING GLASSES: NO
BECAUSE OF A PHYSICAL, MENTAL, OR EMOTIONAL CONDITION, DO YOU HAVE SERIOUS DIFFICULTY CONCENTRATING, REMEMBERING OR MAKING DECISIONS: NO

## 2022-07-26 ASSESSMENT — HEART SCORE
AGE: GREATER THAN 45 TO LESS THAN 65
RISK FACTORS: EQUAL OR GREATER  THAN 3 RISK FACTORS OR HISTORY OF ATHEROSCLEROTIC DISEASE
HEART SCORE: 5
EKG: NON SPECIFIC REPOLARIZATION DISTURBANCE
HISTORY: MODERATELY SUSPICIOUS
TROPONIN: EQUAL OR LESS THAN NORMAL LIMIT

## 2022-07-27 ENCOUNTER — APPOINTMENT (OUTPATIENT)
Dept: CARDIOLOGY | Age: 57
End: 2022-07-27

## 2022-07-27 VITALS
SYSTOLIC BLOOD PRESSURE: 160 MMHG | OXYGEN SATURATION: 97 % | WEIGHT: 190.48 LBS | DIASTOLIC BLOOD PRESSURE: 85 MMHG | RESPIRATION RATE: 18 BRPM | HEIGHT: 65 IN | BODY MASS INDEX: 31.74 KG/M2 | TEMPERATURE: 98.8 F | HEART RATE: 88 BPM

## 2022-07-27 LAB
ANION GAP SERPL CALC-SCNC: 9 MMOL/L (ref 7–19)
AORTIC VALVE AREA: NORMAL
ATRIAL RATE (BPM): 65
ATRIAL RATE (BPM): 72
AV MEAN GRADIENT (AVMG): NORMAL
AV MEAN VELOCITY (AVMV): NORMAL
AV PEAK GRADIENT (AVPG): NORMAL
AV PEAK VELOCITY (AVPV): NORMAL
AV STENOSIS SEVERITY TEXT: NORMAL
BASOPHILS # BLD: 0 K/MCL (ref 0–0.3)
BASOPHILS NFR BLD: 0 %
BUN SERPL-MCNC: 16 MG/DL (ref 6–20)
BUN/CREAT SERPL: 21 (ref 7–25)
CALCIUM SERPL-MCNC: 8.7 MG/DL (ref 8.4–10.2)
CHLORIDE SERPL-SCNC: 107 MMOL/L (ref 97–110)
CO2 SERPL-SCNC: 27 MMOL/L (ref 21–32)
CREAT SERPL-MCNC: 0.77 MG/DL (ref 0.67–1.17)
DEPRECATED RDW RBC: 45.5 FL (ref 39–50)
E WAVE DECELARATION TIME (MDT): 196
EOSINOPHIL # BLD: 0 K/MCL (ref 0–0.5)
EOSINOPHIL NFR BLD: 0 %
ERYTHROCYTE [DISTWIDTH] IN BLOOD: 14 % (ref 11–15)
FASTING DURATION TIME PATIENT: ABNORMAL H
GFR SERPLBLD BASED ON 1.73 SQ M-ARVRAT: >90 ML/MIN
GLUCOSE SERPL-MCNC: 124 MG/DL (ref 70–99)
HCT VFR BLD CALC: 45 % (ref 39–51)
HGB BLD-MCNC: 15 G/DL (ref 13–17)
IMM GRANULOCYTES # BLD AUTO: 0.1 K/MCL (ref 0–0.2)
IMM GRANULOCYTES # BLD: 1 %
LEFT INTERNAL DIMENSION IN SYSTOLE (LVSD): 2.29
LEFT VENTRICULAR INTERNAL DIMENSION IN DIASTOLE (LVDD): 3.4
LV EF: NORMAL %
LYMPHOCYTES # BLD: 0.5 K/MCL (ref 1–4)
LYMPHOCYTES NFR BLD: 4 %
MCH RBC QN AUTO: 29.8 PG (ref 26–34)
MCHC RBC AUTO-ENTMCNC: 33.3 G/DL (ref 32–36.5)
MCV RBC AUTO: 89.5 FL (ref 78–100)
MONOCYTES # BLD: 0.3 K/MCL (ref 0.3–0.9)
MONOCYTES NFR BLD: 2 %
MV E TISSUE VEL LAT (MELV): 9.79
MV E TISSUE VEL MED (MESV): 8.7
MV E WAVE VEL/E TISSUE VEL MED(MSR): 7.05
NEUTROPHILS # BLD: 13.8 K/MCL (ref 1.8–7.7)
NEUTROPHILS NFR BLD: 93 %
NRBC BLD MANUAL-RTO: 0 /100 WBC
P AXIS (DEGREES): 69
P AXIS (DEGREES): 70
PLATELET # BLD AUTO: 180 K/MCL (ref 140–450)
POTASSIUM SERPL-SCNC: 4.1 MMOL/L (ref 3.4–5.1)
PR-INTERVAL (MSEC): 146
PR-INTERVAL (MSEC): 150
QRS-INTERVAL (MSEC): 88
QRS-INTERVAL (MSEC): 94
QT-INTERVAL (MSEC): 348
QT-INTERVAL (MSEC): 372
QTC: 381
QTC: 387
R AXIS (DEGREES): 21
R AXIS (DEGREES): 8
RAINBOW EXTRA TUBES HOLD SPECIMEN: NORMAL
RBC # BLD: 5.03 MIL/MCL (ref 4.5–5.9)
REPORT TEXT: NORMAL
REPORT TEXT: NORMAL
SODIUM SERPL-SCNC: 139 MMOL/L (ref 135–145)
T AXIS (DEGREES): 36
T AXIS (DEGREES): 57
VENTRICULAR RATE EKG/MIN (BPM): 65
VENTRICULAR RATE EKG/MIN (BPM): 72
WBC # BLD: 14.7 K/MCL (ref 4.2–11)

## 2022-07-27 PROCEDURE — 10002803 HB RX 637: Performed by: INTERNAL MEDICINE

## 2022-07-27 PROCEDURE — 10002805 HB CONTRAST AGENT: Performed by: INTERNAL MEDICINE

## 2022-07-27 PROCEDURE — 99220 INITIAL OBSERVATION CARE,LEVL III: CPT | Performed by: INTERNAL MEDICINE

## 2022-07-27 PROCEDURE — 10004651 HB RX, NO CHARGE ITEM: Performed by: INTERNAL MEDICINE

## 2022-07-27 PROCEDURE — 85025 COMPLETE CBC W/AUTO DIFF WBC: CPT | Performed by: INTERNAL MEDICINE

## 2022-07-27 PROCEDURE — 10002800 HB RX 250 W HCPCS: Performed by: INTERNAL MEDICINE

## 2022-07-27 PROCEDURE — 96372 THER/PROPH/DIAG INJ SC/IM: CPT

## 2022-07-27 PROCEDURE — G0378 HOSPITAL OBSERVATION PER HR: HCPCS

## 2022-07-27 PROCEDURE — 10004281 HB COUNTER-STAFF TIME PER 15 MIN

## 2022-07-27 PROCEDURE — 80048 BASIC METABOLIC PNL TOTAL CA: CPT | Performed by: INTERNAL MEDICINE

## 2022-07-27 PROCEDURE — 36415 COLL VENOUS BLD VENIPUNCTURE: CPT | Performed by: INTERNAL MEDICINE

## 2022-07-27 PROCEDURE — 93306 TTE W/DOPPLER COMPLETE: CPT

## 2022-07-27 PROCEDURE — 93306 TTE W/DOPPLER COMPLETE: CPT | Performed by: INTERNAL MEDICINE

## 2022-07-27 RX ORDER — HYDROCODONE BITARTRATE AND ACETAMINOPHEN 5; 325 MG/1; MG/1
1 TABLET ORAL ONCE
Status: COMPLETED | OUTPATIENT
Start: 2022-07-27 | End: 2022-07-27

## 2022-07-27 RX ORDER — HYDROCODONE BITARTRATE AND ACETAMINOPHEN 5; 325 MG/1; MG/1
TABLET ORAL
Status: DISPENSED
Start: 2022-07-27 | End: 2022-07-27

## 2022-07-27 RX ADMIN — DULOXETINE HYDROCHLORIDE 30 MG: 30 CAPSULE, DELAYED RELEASE ORAL at 00:17

## 2022-07-27 RX ADMIN — DULOXETINE HYDROCHLORIDE 30 MG: 30 CAPSULE, DELAYED RELEASE ORAL at 09:59

## 2022-07-27 RX ADMIN — HYDROCODONE BITARTRATE AND ACETAMINOPHEN 1 TABLET: 5; 325 TABLET ORAL at 00:43

## 2022-07-27 RX ADMIN — SODIUM CHLORIDE, PRESERVATIVE FREE 2 ML: 5 INJECTION INTRAVENOUS at 10:00

## 2022-07-27 RX ADMIN — LORATADINE 10 MG: 10 TABLET ORAL at 05:33

## 2022-07-27 RX ADMIN — ROPINIROLE HYDROCHLORIDE 1 MG: 1 TABLET, FILM COATED ORAL at 00:17

## 2022-07-27 RX ADMIN — CYCLOBENZAPRINE HYDROCHLORIDE 10 MG: 5 TABLET, FILM COATED ORAL at 00:17

## 2022-07-27 RX ADMIN — ENOXAPARIN SODIUM 40 MG: 40 INJECTION SUBCUTANEOUS at 10:00

## 2022-07-27 RX ADMIN — GABAPENTIN 600 MG: 300 CAPSULE ORAL at 00:17

## 2022-07-27 RX ADMIN — PERFLUTREN 1.5 ML: 6.52 INJECTION, SUSPENSION INTRAVENOUS at 13:31

## 2022-07-27 RX ADMIN — MODAFINIL 200 MG: 100 TABLET ORAL at 09:59

## 2022-07-27 RX ADMIN — PREDNISONE 30 MG: 10 TABLET ORAL at 09:59

## 2022-07-27 ASSESSMENT — ENCOUNTER SYMPTOMS
HALLUCINATIONS: 0
TREMORS: 0
INSOMNIA: 0
HEMOPTYSIS: 0
ALTERED MENTAL STATUS: 0
JAUNDICE: 0
POLYDIPSIA: 0
NAUSEA: 0
DEPRESSION: 0
NUMBNESS: 0
DIZZINESS: 0
LOSS OF BALANCE: 0
DECREASED APPETITE: 0
SHORTNESS OF BREATH: 0
GASTROINTESTINAL NEGATIVE: 1
COUGH: 0
DIARRHEA: 0
PSYCHIATRIC NEGATIVE: 1
HEADACHES: 0
LIGHT-HEADEDNESS: 0
DOUBLE VISION: 0
EXCESSIVE DAYTIME SLEEPINESS: 0
FOCAL WEAKNESS: 0
POOR WOUND HEALING: 0
SPUTUM PRODUCTION: 0
WEIGHT GAIN: 0
VOMITING: 0
CHEST TIGHTNESS: 1
HEARTBURN: 0
BLURRED VISION: 0
WHEEZING: 0
NEAR-SYNCOPE: 0
SYNCOPE: 0
SEIZURES: 0
NEUROLOGICAL NEGATIVE: 1
NAIL CHANGES: 0
SLEEP DISTURBANCES DUE TO BREATHING: 0
BRIEF PARALYSIS: 0
WEIGHT LOSS: 0
MEMORY LOSS: 0
VERTIGO: 0
CONSTITUTIONAL NEGATIVE: 1
POLYPHAGIA: 0
COLOR CHANGE: 0
SENSORY CHANGE: 0
ORTHOPNEA: 0
SNORING: 0
ABDOMINAL PAIN: 0
WEAKNESS: 0
PARESTHESIAS: 0
DISTURBANCES IN COORDINATION: 0

## 2022-07-27 ASSESSMENT — PAIN SCALES - GENERAL
PAINLEVEL_OUTOF10: 2
PAINLEVEL_OUTOF10: 10

## 2022-07-27 ASSESSMENT — LIFESTYLE VARIABLES: SUBSTANCE_ABUSE: 0

## 2022-07-29 ENCOUNTER — TELEPHONE (OUTPATIENT)
Dept: SCHEDULING | Age: 57
End: 2022-07-29

## 2022-08-02 ENCOUNTER — E-ADVICE (OUTPATIENT)
Dept: NEUROLOGY | Age: 57
End: 2022-08-02

## 2022-08-03 ENCOUNTER — OFFICE VISIT (OUTPATIENT)
Dept: RHEUMATOLOGY | Age: 57
End: 2022-08-03

## 2022-08-03 ENCOUNTER — DOCUMENTATION (OUTPATIENT)
Dept: GERIATRIC MEDICINE | Age: 57
End: 2022-08-03

## 2022-08-03 VITALS
SYSTOLIC BLOOD PRESSURE: 147 MMHG | WEIGHT: 186 LBS | DIASTOLIC BLOOD PRESSURE: 78 MMHG | BODY MASS INDEX: 30.95 KG/M2 | TEMPERATURE: 97.4 F | HEART RATE: 85 BPM

## 2022-08-03 DIAGNOSIS — R53.82 CHRONIC FATIGUE: ICD-10-CM

## 2022-08-03 DIAGNOSIS — M79.7 FIBROMYALGIA: Primary | ICD-10-CM

## 2022-08-03 DIAGNOSIS — M25.561 CHRONIC PAIN OF BOTH KNEES: ICD-10-CM

## 2022-08-03 DIAGNOSIS — M48.061 SPINAL STENOSIS OF LUMBAR REGION, UNSPECIFIED WHETHER NEUROGENIC CLAUDICATION PRESENT: ICD-10-CM

## 2022-08-03 DIAGNOSIS — G89.29 CHRONIC PAIN OF BOTH KNEES: ICD-10-CM

## 2022-08-03 DIAGNOSIS — M25.562 CHRONIC PAIN OF BOTH KNEES: ICD-10-CM

## 2022-08-03 DIAGNOSIS — R41.840 CONCENTRATION DEFICIT: ICD-10-CM

## 2022-08-03 DIAGNOSIS — M79.7 FIBROMYALGIA: ICD-10-CM

## 2022-08-03 PROCEDURE — 99214 OFFICE O/P EST MOD 30 MIN: CPT | Performed by: INTERNAL MEDICINE

## 2022-08-03 RX ORDER — TAMSULOSIN HYDROCHLORIDE 0.4 MG/1
CAPSULE ORAL
COMMUNITY
Start: 2022-08-01 | End: 2023-01-23

## 2022-08-03 RX ORDER — CYCLOBENZAPRINE HCL 10 MG
10 TABLET ORAL DAILY PRN
COMMUNITY
Start: 2022-07-12 | End: 2022-10-08 | Stop reason: SDUPTHER

## 2022-08-03 RX ORDER — PIMECROLIMUS 10 MG/G
CREAM TOPICAL
COMMUNITY
Start: 2022-07-12

## 2022-08-03 ASSESSMENT — ENCOUNTER SYMPTOMS
SLEEP DISTURBANCE: 1
FATIGUE: 1

## 2022-08-04 ENCOUNTER — DOCUMENTATION (OUTPATIENT)
Dept: GERIATRIC MEDICINE | Age: 57
End: 2022-08-04

## 2022-08-04 DIAGNOSIS — M25.512 PAIN IN JOINT OF LEFT SHOULDER: ICD-10-CM

## 2022-08-06 ENCOUNTER — DOCUMENTATION (OUTPATIENT)
Dept: INTERNAL MEDICINE | Age: 57
End: 2022-08-06

## 2022-08-06 DIAGNOSIS — I10 BENIGN ESSENTIAL HYPERTENSION: ICD-10-CM

## 2022-08-06 DIAGNOSIS — J45.20 MILD INTERMITTENT ASTHMA WITHOUT COMPLICATION: ICD-10-CM

## 2022-08-10 ENCOUNTER — OFFICE VISIT (OUTPATIENT)
Dept: HEMATOLOGY/ONCOLOGY | Age: 57
End: 2022-08-10
Attending: INTERNAL MEDICINE

## 2022-08-10 ENCOUNTER — LAB SERVICES (OUTPATIENT)
Dept: LAB | Age: 57
End: 2022-08-10

## 2022-08-10 VITALS
RESPIRATION RATE: 18 BRPM | WEIGHT: 185.08 LBS | BODY MASS INDEX: 30.8 KG/M2 | OXYGEN SATURATION: 99 % | DIASTOLIC BLOOD PRESSURE: 100 MMHG | TEMPERATURE: 98.1 F | HEART RATE: 77 BPM | SYSTOLIC BLOOD PRESSURE: 160 MMHG

## 2022-08-10 DIAGNOSIS — C82.03 FOLLICULAR LYMPHOMA GRADE I OF INTRA-ABDOMINAL LYMPH NODES (CMD): ICD-10-CM

## 2022-08-10 DIAGNOSIS — C82.03 FOLLICULAR LYMPHOMA GRADE I OF INTRA-ABDOMINAL LYMPH NODES (CMD): Primary | ICD-10-CM

## 2022-08-10 LAB
ALBUMIN SERPL-MCNC: 3.3 G/DL (ref 3.6–5.1)
ALBUMIN/GLOB SERPL: 0.9 {RATIO} (ref 1–2.4)
ALP SERPL-CCNC: 88 UNITS/L (ref 45–117)
ALT SERPL-CCNC: 30 UNITS/L
ANION GAP SERPL CALC-SCNC: 8 MMOL/L (ref 7–19)
AST SERPL-CCNC: 20 UNITS/L
BASOPHILS # BLD: 0.1 K/MCL (ref 0–0.3)
BASOPHILS NFR BLD: 1 %
BILIRUB SERPL-MCNC: 1.1 MG/DL (ref 0.2–1)
BUN SERPL-MCNC: 11 MG/DL (ref 6–20)
BUN/CREAT SERPL: 12 (ref 7–25)
CALCIUM SERPL-MCNC: 9.3 MG/DL (ref 8.4–10.2)
CHLORIDE SERPL-SCNC: 105 MMOL/L (ref 97–110)
CO2 SERPL-SCNC: 30 MMOL/L (ref 21–32)
CREAT SERPL-MCNC: 0.9 MG/DL (ref 0.67–1.17)
DEPRECATED RDW RBC: 44.9 FL (ref 39–50)
EOSINOPHIL # BLD: 0.2 K/MCL (ref 0–0.5)
EOSINOPHIL NFR BLD: 3 %
ERYTHROCYTE [DISTWIDTH] IN BLOOD: 13.5 % (ref 11–15)
FASTING DURATION TIME PATIENT: ABNORMAL H
GFR SERPLBLD BASED ON 1.73 SQ M-ARVRAT: >90 ML/MIN
GLOBULIN SER-MCNC: 3.6 G/DL (ref 2–4)
GLUCOSE SERPL-MCNC: 94 MG/DL (ref 70–99)
HCT VFR BLD CALC: 46.5 % (ref 39–51)
HGB BLD-MCNC: 15.2 G/DL (ref 13–17)
IGA SERPL-MCNC: 179 MG/DL (ref 70–400)
IGG SERPL-MCNC: 782 MG/DL (ref 700–1600)
IGM SERPL-MCNC: 39 MG/DL (ref 40–230)
IMM GRANULOCYTES # BLD AUTO: 0.1 K/MCL (ref 0–0.2)
IMM GRANULOCYTES # BLD: 1 %
LYMPHOCYTES # BLD: 1.4 K/MCL (ref 1–4)
LYMPHOCYTES NFR BLD: 15 %
MCH RBC QN AUTO: 29.7 PG (ref 26–34)
MCHC RBC AUTO-ENTMCNC: 32.7 G/DL (ref 32–36.5)
MCV RBC AUTO: 91 FL (ref 78–100)
MONOCYTES # BLD: 0.5 K/MCL (ref 0.3–0.9)
MONOCYTES NFR BLD: 5 %
NEUTROPHILS # BLD: 6.9 K/MCL (ref 1.8–7.7)
NEUTROPHILS NFR BLD: 75 %
NRBC BLD MANUAL-RTO: 0 /100 WBC
PLATELET # BLD AUTO: 182 K/MCL (ref 140–450)
POTASSIUM SERPL-SCNC: 4.3 MMOL/L (ref 3.4–5.1)
PROT SERPL-MCNC: 6.9 G/DL (ref 6.4–8.2)
RBC # BLD: 5.11 MIL/MCL (ref 4.5–5.9)
SODIUM SERPL-SCNC: 139 MMOL/L (ref 135–145)
WBC # BLD: 9.1 K/MCL (ref 4.2–11)

## 2022-08-10 PROCEDURE — 80053 COMPREHEN METABOLIC PANEL: CPT | Performed by: INTERNAL MEDICINE

## 2022-08-10 PROCEDURE — 3077F SYST BP >= 140 MM HG: CPT | Performed by: INTERNAL MEDICINE

## 2022-08-10 PROCEDURE — 36415 COLL VENOUS BLD VENIPUNCTURE: CPT | Performed by: INTERNAL MEDICINE

## 2022-08-10 PROCEDURE — 85025 COMPLETE CBC W/AUTO DIFF WBC: CPT | Performed by: INTERNAL MEDICINE

## 2022-08-10 PROCEDURE — 82784 ASSAY IGA/IGD/IGG/IGM EACH: CPT | Performed by: INTERNAL MEDICINE

## 2022-08-10 PROCEDURE — 99213 OFFICE O/P EST LOW 20 MIN: CPT | Performed by: INTERNAL MEDICINE

## 2022-08-10 PROCEDURE — 3080F DIAST BP >= 90 MM HG: CPT | Performed by: INTERNAL MEDICINE

## 2022-08-10 PROCEDURE — 99211 OFF/OP EST MAY X REQ PHY/QHP: CPT

## 2022-08-10 ASSESSMENT — PAIN SCALES - GENERAL: PAINLEVEL: 7

## 2022-08-17 ENCOUNTER — TELEPHONE (OUTPATIENT)
Dept: GERIATRIC MEDICINE | Age: 57
End: 2022-08-17

## 2022-08-31 ENCOUNTER — DOCUMENTATION (OUTPATIENT)
Dept: GERIATRIC MEDICINE | Age: 57
End: 2022-08-31

## 2022-08-31 DIAGNOSIS — M54.12 CERVICAL RADICULOPATHY: ICD-10-CM

## 2022-09-02 ENCOUNTER — OFFICE VISIT (OUTPATIENT)
Dept: RHEUMATOLOGY | Age: 57
End: 2022-09-02

## 2022-09-02 VITALS
DIASTOLIC BLOOD PRESSURE: 97 MMHG | OXYGEN SATURATION: 97 % | HEIGHT: 65 IN | BODY MASS INDEX: 31.61 KG/M2 | WEIGHT: 189.71 LBS | HEART RATE: 94 BPM | TEMPERATURE: 98 F | SYSTOLIC BLOOD PRESSURE: 168 MMHG

## 2022-09-02 DIAGNOSIS — M50.30 DDD (DEGENERATIVE DISC DISEASE), CERVICAL: ICD-10-CM

## 2022-09-02 DIAGNOSIS — M54.12 CERVICAL RADICULOPATHY: Primary | ICD-10-CM

## 2022-09-02 PROCEDURE — 99204 OFFICE O/P NEW MOD 45 MIN: CPT | Performed by: INTERNAL MEDICINE

## 2022-09-02 RX ORDER — METHYLPREDNISOLONE 4 MG/1
TABLET ORAL
COMMUNITY
Start: 2022-08-31 | End: 2022-09-07

## 2022-09-02 RX ORDER — GABAPENTIN 300 MG/1
300 CAPSULE ORAL DAILY
Qty: 30 CAPSULE | Refills: 2 | Status: SHIPPED | OUTPATIENT
Start: 2022-09-02 | End: 2022-12-12 | Stop reason: SDUPTHER

## 2022-09-02 RX ORDER — TRAMADOL HYDROCHLORIDE 50 MG/1
50 TABLET ORAL 2 TIMES DAILY PRN
COMMUNITY
Start: 2022-08-31 | End: 2022-09-05

## 2022-09-02 RX ORDER — FLUOCINOLONE ACETONIDE 0.11 MG/ML
OIL TOPICAL
COMMUNITY
Start: 2022-08-16

## 2022-09-02 ASSESSMENT — PAIN SCALES - GENERAL: PAINLEVEL: 6

## 2022-10-04 ENCOUNTER — TELEPHONE (OUTPATIENT)
Dept: RHEUMATOLOGY | Age: 57
End: 2022-10-04

## 2022-10-08 RX ORDER — CYCLOBENZAPRINE HCL 10 MG
10 TABLET ORAL AT BEDTIME
Qty: 30 TABLET | Refills: 0 | Status: SHIPPED | OUTPATIENT
Start: 2022-10-08 | End: 2022-12-08

## 2022-10-12 ENCOUNTER — OFFICE VISIT (OUTPATIENT)
Dept: PAIN MANAGEMENT | Age: 57
End: 2022-10-12
Attending: ANESTHESIOLOGY

## 2022-10-12 VITALS
WEIGHT: 179 LBS | OXYGEN SATURATION: 97 % | HEART RATE: 98 BPM | BODY MASS INDEX: 29.82 KG/M2 | DIASTOLIC BLOOD PRESSURE: 97 MMHG | HEIGHT: 65 IN | RESPIRATION RATE: 16 BRPM | SYSTOLIC BLOOD PRESSURE: 189 MMHG

## 2022-10-12 DIAGNOSIS — M54.16 LUMBAR RADICULOPATHY: Primary | ICD-10-CM

## 2022-10-12 DIAGNOSIS — M54.12 CERVICAL RADICULOPATHY: ICD-10-CM

## 2022-10-12 PROCEDURE — 99213 OFFICE O/P EST LOW 20 MIN: CPT | Performed by: ANESTHESIOLOGY

## 2022-10-12 RX ORDER — DUPILUMAB 300 MG/2ML
300 INJECTION, SOLUTION SUBCUTANEOUS 2 TIMES DAILY
COMMUNITY
Start: 2022-09-30

## 2022-10-12 RX ORDER — MYCOPHENOLATE MOFETIL 500 MG/1
500 TABLET ORAL 2 TIMES DAILY
COMMUNITY
Start: 2022-09-12 | End: 2023-01-26 | Stop reason: ALTCHOICE

## 2022-10-12 ASSESSMENT — PAIN SCALES - GENERAL: PAINLEVEL: 7

## 2022-10-13 ENCOUNTER — TELEPHONE (OUTPATIENT)
Dept: NEUROLOGY | Age: 57
End: 2022-10-13

## 2022-10-14 ENCOUNTER — OFFICE VISIT (OUTPATIENT)
Dept: NEUROLOGY | Age: 57
End: 2022-10-14

## 2022-10-14 VITALS
BODY MASS INDEX: 31.44 KG/M2 | DIASTOLIC BLOOD PRESSURE: 98 MMHG | SYSTOLIC BLOOD PRESSURE: 173 MMHG | HEIGHT: 65 IN | WEIGHT: 188.71 LBS | HEART RATE: 78 BPM | OXYGEN SATURATION: 97 %

## 2022-10-14 DIAGNOSIS — M79.7 FIBROMYALGIA: ICD-10-CM

## 2022-10-14 DIAGNOSIS — G47.30 SLEEP APNEA, UNSPECIFIED TYPE: ICD-10-CM

## 2022-10-14 DIAGNOSIS — F32.A DEPRESSION, UNSPECIFIED DEPRESSION TYPE: ICD-10-CM

## 2022-10-14 DIAGNOSIS — R41.3 MEMORY LOSS: Primary | ICD-10-CM

## 2022-10-14 DIAGNOSIS — H91.90 HEARING LOSS, UNSPECIFIED HEARING LOSS TYPE, UNSPECIFIED LATERALITY: ICD-10-CM

## 2022-10-14 PROCEDURE — 99205 OFFICE O/P NEW HI 60 MIN: CPT | Performed by: FAMILY MEDICINE

## 2022-10-14 PROCEDURE — 3077F SYST BP >= 140 MM HG: CPT | Performed by: FAMILY MEDICINE

## 2022-10-14 PROCEDURE — 3080F DIAST BP >= 90 MM HG: CPT | Performed by: FAMILY MEDICINE

## 2022-10-15 PROBLEM — R41.3 MEMORY LOSS: Status: ACTIVE | Noted: 2022-10-15

## 2022-10-16 ENCOUNTER — CLINICAL DOCUMENTATION (OUTPATIENT)
Dept: INTERNAL MEDICINE | Age: 57
End: 2022-10-16

## 2022-10-16 DIAGNOSIS — R41.89 COGNITIVE IMPAIRMENT: ICD-10-CM

## 2022-10-19 ENCOUNTER — HOSPITAL ENCOUNTER (OUTPATIENT)
Dept: REHABILITATION | Age: 57
Discharge: STILL ADMITTED OR ADMINISTRATIVE | End: 2022-10-19
Attending: FAMILY MEDICINE

## 2022-10-19 DIAGNOSIS — R41.89 COGNITIVE IMPAIRMENT: Primary | ICD-10-CM

## 2022-10-19 PROCEDURE — 92523 SPEECH SOUND LANG COMPREHEN: CPT | Performed by: SPEECH-LANGUAGE PATHOLOGIST

## 2022-10-19 PROCEDURE — 92507 TX SP LANG VOICE COMM INDIV: CPT | Performed by: SPEECH-LANGUAGE PATHOLOGIST

## 2022-10-20 ENCOUNTER — IMAGING SERVICES (OUTPATIENT)
Dept: PAIN MANAGEMENT | Age: 57
End: 2022-10-20
Attending: ANESTHESIOLOGY

## 2022-10-20 ENCOUNTER — OFFICE VISIT (OUTPATIENT)
Dept: PAIN MANAGEMENT | Age: 57
End: 2022-10-20
Attending: ANESTHESIOLOGY

## 2022-10-20 VITALS
BODY MASS INDEX: 29.82 KG/M2 | OXYGEN SATURATION: 96 % | RESPIRATION RATE: 18 BRPM | DIASTOLIC BLOOD PRESSURE: 107 MMHG | WEIGHT: 179 LBS | HEART RATE: 84 BPM | HEIGHT: 65 IN | SYSTOLIC BLOOD PRESSURE: 183 MMHG

## 2022-10-20 DIAGNOSIS — M54.2 CERVICAL PAIN (NECK): ICD-10-CM

## 2022-10-20 DIAGNOSIS — M54.12 CERVICAL RADICULOPATHY: ICD-10-CM

## 2022-10-20 PROCEDURE — 10002801 HB RX 250 W/O HCPCS: Performed by: ANESTHESIOLOGY

## 2022-10-20 PROCEDURE — 62323 NJX INTERLAMINAR LMBR/SAC: CPT | Performed by: ANESTHESIOLOGY

## 2022-10-20 PROCEDURE — A9585 GADOBUTROL INJECTION: HCPCS | Performed by: ANESTHESIOLOGY

## 2022-10-20 PROCEDURE — 10002805 HB CONTRAST AGENT: Performed by: ANESTHESIOLOGY

## 2022-10-20 RX ORDER — METHYLPREDNISOLONE ACETATE 80 MG/ML
80 INJECTION, SUSPENSION INTRA-ARTICULAR; INTRALESIONAL; INTRAMUSCULAR; SOFT TISSUE ONCE
Status: COMPLETED | OUTPATIENT
Start: 2022-10-20 | End: 2022-10-20

## 2022-10-20 RX ORDER — LIDOCAINE HYDROCHLORIDE 10 MG/ML
3 INJECTION, SOLUTION EPIDURAL; INFILTRATION; INTRACAUDAL; PERINEURAL ONCE
Status: COMPLETED | OUTPATIENT
Start: 2022-10-20 | End: 2022-10-20

## 2022-10-20 RX ORDER — GADOBUTROL 604.72 MG/ML
2 INJECTION INTRAVENOUS ONCE
Status: COMPLETED | OUTPATIENT
Start: 2022-10-20 | End: 2022-10-20

## 2022-10-20 RX ADMIN — GADOBUTROL 2 ML: 604.72 INJECTION INTRAVENOUS at 13:28

## 2022-10-20 RX ADMIN — METHYLPREDNISOLONE ACETATE 80 MG: 80 INJECTION, SUSPENSION INTRA-ARTICULAR; INTRALESIONAL; INTRAMUSCULAR; SOFT TISSUE at 13:34

## 2022-10-20 RX ADMIN — LIDOCAINE HYDROCHLORIDE 30 MG: 10 INJECTION, SOLUTION EPIDURAL; INFILTRATION; INTRACAUDAL; PERINEURAL at 13:22

## 2022-10-20 ASSESSMENT — PAIN SCALES - GENERAL: PAINLEVEL: 6

## 2022-10-24 ENCOUNTER — TELEPHONE (OUTPATIENT)
Dept: PAIN MANAGEMENT | Age: 57
End: 2022-10-24

## 2022-10-26 ENCOUNTER — APPOINTMENT (OUTPATIENT)
Dept: REHABILITATION | Age: 57
End: 2022-10-26
Attending: FAMILY MEDICINE

## 2022-11-02 ENCOUNTER — APPOINTMENT (OUTPATIENT)
Dept: REHABILITATION | Age: 57
End: 2022-11-02
Attending: FAMILY MEDICINE

## 2022-11-09 ENCOUNTER — APPOINTMENT (OUTPATIENT)
Dept: REHABILITATION | Age: 57
End: 2022-11-09
Attending: FAMILY MEDICINE

## 2022-11-16 ENCOUNTER — APPOINTMENT (OUTPATIENT)
Dept: REHABILITATION | Age: 57
End: 2022-11-16
Attending: FAMILY MEDICINE

## 2022-12-07 ENCOUNTER — CLINICAL DOCUMENTATION (OUTPATIENT)
Dept: SLEEP MEDICINE | Age: 57
End: 2022-12-07

## 2022-12-07 ENCOUNTER — LAB SERVICES (OUTPATIENT)
Dept: LAB | Age: 57
End: 2022-12-07

## 2022-12-07 ENCOUNTER — OFFICE VISIT (OUTPATIENT)
Dept: HEMATOLOGY/ONCOLOGY | Age: 57
End: 2022-12-07
Attending: INTERNAL MEDICINE

## 2022-12-07 VITALS
BODY MASS INDEX: 32.05 KG/M2 | DIASTOLIC BLOOD PRESSURE: 108 MMHG | HEART RATE: 84 BPM | RESPIRATION RATE: 16 BRPM | OXYGEN SATURATION: 100 % | TEMPERATURE: 98.3 F | SYSTOLIC BLOOD PRESSURE: 189 MMHG | WEIGHT: 192.57 LBS

## 2022-12-07 DIAGNOSIS — C82.03 FOLLICULAR LYMPHOMA GRADE I OF INTRA-ABDOMINAL LYMPH NODES (CMD): ICD-10-CM

## 2022-12-07 DIAGNOSIS — C82.03 FOLLICULAR LYMPHOMA GRADE I OF INTRA-ABDOMINAL LYMPH NODES (CMD): Primary | ICD-10-CM

## 2022-12-07 LAB
ALBUMIN SERPL-MCNC: 3.6 G/DL (ref 3.6–5.1)
ALBUMIN/GLOB SERPL: 0.9 {RATIO} (ref 1–2.4)
ALP SERPL-CCNC: 101 UNITS/L (ref 45–117)
ALT SERPL-CCNC: 42 UNITS/L
ANION GAP SERPL CALC-SCNC: 10 MMOL/L (ref 7–19)
AST SERPL-CCNC: 26 UNITS/L
BASOPHILS # BLD: 0.1 K/MCL (ref 0–0.3)
BASOPHILS NFR BLD: 1 %
BILIRUB SERPL-MCNC: 0.8 MG/DL (ref 0.2–1)
BUN SERPL-MCNC: 9 MG/DL (ref 6–20)
BUN/CREAT SERPL: 12 (ref 7–25)
CALCIUM SERPL-MCNC: 9.3 MG/DL (ref 8.4–10.2)
CHLORIDE SERPL-SCNC: 107 MMOL/L (ref 97–110)
CO2 SERPL-SCNC: 30 MMOL/L (ref 21–32)
CREAT SERPL-MCNC: 0.76 MG/DL (ref 0.67–1.17)
DEPRECATED RDW RBC: 43.7 FL (ref 39–50)
EOSINOPHIL # BLD: 1.1 K/MCL (ref 0–0.5)
EOSINOPHIL NFR BLD: 13 %
ERYTHROCYTE [DISTWIDTH] IN BLOOD: 13.3 % (ref 11–15)
FASTING DURATION TIME PATIENT: ABNORMAL H
GFR SERPLBLD BASED ON 1.73 SQ M-ARVRAT: >90 ML/MIN
GLOBULIN SER-MCNC: 3.9 G/DL (ref 2–4)
GLUCOSE SERPL-MCNC: 97 MG/DL (ref 70–99)
HCT VFR BLD CALC: 45.2 % (ref 39–51)
HGB BLD-MCNC: 14.9 G/DL (ref 13–17)
IMM GRANULOCYTES # BLD AUTO: 0 K/MCL (ref 0–0.2)
IMM GRANULOCYTES # BLD: 0 %
LYMPHOCYTES # BLD: 1.9 K/MCL (ref 1–4)
LYMPHOCYTES NFR BLD: 22 %
MCH RBC QN AUTO: 29.5 PG (ref 26–34)
MCHC RBC AUTO-ENTMCNC: 33 G/DL (ref 32–36.5)
MCV RBC AUTO: 89.5 FL (ref 78–100)
MONOCYTES # BLD: 0.6 K/MCL (ref 0.3–0.9)
MONOCYTES NFR BLD: 7 %
NEUTROPHILS # BLD: 4.9 K/MCL (ref 1.8–7.7)
NEUTROPHILS NFR BLD: 57 %
NRBC BLD MANUAL-RTO: 0 /100 WBC
PLATELET # BLD AUTO: 177 K/MCL (ref 140–450)
POTASSIUM SERPL-SCNC: 3.6 MMOL/L (ref 3.4–5.1)
PROT SERPL-MCNC: 7.5 G/DL (ref 6.4–8.2)
RBC # BLD: 5.05 MIL/MCL (ref 4.5–5.9)
SODIUM SERPL-SCNC: 143 MMOL/L (ref 135–145)
WBC # BLD: 8.7 K/MCL (ref 4.2–11)

## 2022-12-07 PROCEDURE — 3080F DIAST BP >= 90 MM HG: CPT | Performed by: INTERNAL MEDICINE

## 2022-12-07 PROCEDURE — 85025 COMPLETE CBC W/AUTO DIFF WBC: CPT | Performed by: INTERNAL MEDICINE

## 2022-12-07 PROCEDURE — 99213 OFFICE O/P EST LOW 20 MIN: CPT | Performed by: INTERNAL MEDICINE

## 2022-12-07 PROCEDURE — 3077F SYST BP >= 140 MM HG: CPT | Performed by: INTERNAL MEDICINE

## 2022-12-07 PROCEDURE — 80053 COMPREHEN METABOLIC PANEL: CPT | Performed by: INTERNAL MEDICINE

## 2022-12-07 PROCEDURE — 36415 COLL VENOUS BLD VENIPUNCTURE: CPT | Performed by: INTERNAL MEDICINE

## 2022-12-07 PROCEDURE — 99211 OFF/OP EST MAY X REQ PHY/QHP: CPT

## 2022-12-07 ASSESSMENT — PAIN SCALES - GENERAL: PAINLEVEL: 7

## 2022-12-07 ASSESSMENT — PATIENT HEALTH QUESTIONNAIRE - PHQ9
SUM OF ALL RESPONSES TO PHQ9 QUESTIONS 1 AND 2: 1
2. FEELING DOWN, DEPRESSED OR HOPELESS: SEVERAL DAYS
SUM OF ALL RESPONSES TO PHQ9 QUESTIONS 1 AND 2: 1
CLINICAL INTERPRETATION OF PHQ2 SCORE: NO FURTHER SCREENING NEEDED
1. LITTLE INTEREST OR PLEASURE IN DOING THINGS: NOT AT ALL

## 2022-12-08 RX ORDER — CYCLOBENZAPRINE HCL 10 MG
TABLET ORAL
Qty: 30 TABLET | Refills: 0 | Status: SHIPPED | OUTPATIENT
Start: 2022-12-08

## 2022-12-12 RX ORDER — GABAPENTIN 300 MG/1
300 CAPSULE ORAL DAILY
Qty: 30 CAPSULE | Refills: 2 | Status: SHIPPED | OUTPATIENT
Start: 2022-12-12 | End: 2023-05-18 | Stop reason: SDUPTHER

## 2022-12-12 RX ORDER — ALBUTEROL SULFATE 90 UG/1
2 AEROSOL, METERED RESPIRATORY (INHALATION) EVERY 4 HOURS PRN
Qty: 1 EACH | Refills: 0 | Status: SHIPPED | OUTPATIENT
Start: 2022-12-12 | End: 2022-12-13 | Stop reason: SDUPTHER

## 2022-12-12 RX ORDER — ALBUTEROL SULFATE 90 UG/1
2 AEROSOL, METERED RESPIRATORY (INHALATION) EVERY 4 HOURS PRN
Qty: 1 EACH | Refills: 0 | Status: SHIPPED | OUTPATIENT
Start: 2022-12-12 | End: 2022-12-12 | Stop reason: SDUPTHER

## 2022-12-13 RX ORDER — ALBUTEROL SULFATE 90 UG/1
2 AEROSOL, METERED RESPIRATORY (INHALATION) EVERY 4 HOURS PRN
Qty: 1 EACH | Refills: 0 | Status: SHIPPED | OUTPATIENT
Start: 2022-12-13 | End: 2023-04-03

## 2022-12-19 ENCOUNTER — EXTERNAL RECORD (OUTPATIENT)
Dept: OTHER | Age: 57
End: 2022-12-19

## 2023-01-05 ENCOUNTER — OFFICE VISIT (OUTPATIENT)
Dept: SLEEP MEDICINE | Age: 58
End: 2023-01-05
Attending: NURSE PRACTITIONER

## 2023-01-05 ENCOUNTER — CLINICAL DOCUMENTATION (OUTPATIENT)
Dept: GERIATRIC MEDICINE | Age: 58
End: 2023-01-05

## 2023-01-05 VITALS
SYSTOLIC BLOOD PRESSURE: 148 MMHG | HEART RATE: 70 BPM | DIASTOLIC BLOOD PRESSURE: 98 MMHG | WEIGHT: 201.7 LBS | OXYGEN SATURATION: 99 % | RESPIRATION RATE: 16 BRPM | BODY MASS INDEX: 33.61 KG/M2 | TEMPERATURE: 96.6 F | HEIGHT: 65 IN

## 2023-01-05 DIAGNOSIS — G25.81 RESTLESS LEGS SYNDROME: ICD-10-CM

## 2023-01-05 DIAGNOSIS — G47.33 OBSTRUCTIVE SLEEP APNEA: Primary | ICD-10-CM

## 2023-01-05 DIAGNOSIS — G47.33 OBSTRUCTIVE SLEEP APNEA: ICD-10-CM

## 2023-01-05 PROCEDURE — 3080F DIAST BP >= 90 MM HG: CPT | Performed by: NURSE PRACTITIONER

## 2023-01-05 PROCEDURE — 99214 OFFICE O/P EST MOD 30 MIN: CPT | Performed by: NURSE PRACTITIONER

## 2023-01-05 PROCEDURE — 99213 OFFICE O/P EST LOW 20 MIN: CPT | Performed by: NURSE PRACTITIONER

## 2023-01-05 PROCEDURE — 3077F SYST BP >= 140 MM HG: CPT | Performed by: NURSE PRACTITIONER

## 2023-01-05 RX ORDER — ROPINIROLE 1 MG/1
2 TABLET, FILM COATED ORAL NIGHTLY
Qty: 90 TABLET | Refills: 1 | Status: SHIPPED | OUTPATIENT
Start: 2023-01-05 | End: 2023-04-24

## 2023-01-05 RX ORDER — MODAFINIL 200 MG/1
200 TABLET ORAL 2 TIMES DAILY
Qty: 180 TABLET | Refills: 1 | Status: SHIPPED | OUTPATIENT
Start: 2023-01-05 | End: 2023-06-23 | Stop reason: SDUPTHER

## 2023-01-05 ASSESSMENT — ENCOUNTER SYMPTOMS
GASTROINTESTINAL NEGATIVE: 1
PSYCHIATRIC NEGATIVE: 1
RESPIRATORY NEGATIVE: 1
ENDOCRINE NEGATIVE: 1
EXCESSIVE DAYTIME SLEEPINESS: 1
EYES NEGATIVE: 1
CONSTITUTIONAL NEGATIVE: 1

## 2023-01-08 DIAGNOSIS — G47.33 OBSTRUCTIVE SLEEP APNEA: ICD-10-CM

## 2023-01-09 RX ORDER — MODAFINIL 200 MG/1
TABLET ORAL
Qty: 30 TABLET | OUTPATIENT
Start: 2023-01-09

## 2023-01-22 DIAGNOSIS — G25.81 RESTLESS LEGS SYNDROME: ICD-10-CM

## 2023-01-23 RX ORDER — ROPINIROLE 1 MG/1
TABLET, FILM COATED ORAL
Qty: 90 TABLET | Refills: 1 | OUTPATIENT
Start: 2023-01-23

## 2023-01-23 RX ORDER — TAMSULOSIN HYDROCHLORIDE 0.4 MG/1
CAPSULE ORAL
Qty: 90 CAPSULE | Refills: 2 | Status: SHIPPED | OUTPATIENT
Start: 2023-01-23 | End: 2023-08-08

## 2023-01-25 ENCOUNTER — TELEPHONE (OUTPATIENT)
Dept: INTERNAL MEDICINE | Age: 58
End: 2023-01-25

## 2023-01-26 PROBLEM — Z23 NEED FOR PROPHYLACTIC VACCINATION AGAINST HEPATITIS B VIRUS: Status: ACTIVE | Noted: 2023-01-26

## 2023-02-13 ENCOUNTER — TELEPHONE (OUTPATIENT)
Dept: NEUROLOGY | Age: 58
End: 2023-02-13

## 2023-03-20 ENCOUNTER — TELEPHONE (OUTPATIENT)
Dept: NEUROLOGY | Age: 58
End: 2023-03-20

## 2023-03-22 ENCOUNTER — OFFICE VISIT (OUTPATIENT)
Dept: NEUROLOGY | Age: 58
End: 2023-03-22

## 2023-03-22 VITALS
WEIGHT: 195 LBS | HEIGHT: 65 IN | HEART RATE: 76 BPM | DIASTOLIC BLOOD PRESSURE: 102 MMHG | OXYGEN SATURATION: 99 % | BODY MASS INDEX: 32.49 KG/M2 | SYSTOLIC BLOOD PRESSURE: 186 MMHG | RESPIRATION RATE: 20 BRPM

## 2023-03-22 DIAGNOSIS — G89.4 PAIN SYNDROME, CHRONIC: ICD-10-CM

## 2023-03-22 DIAGNOSIS — R41.89 COGNITIVE IMPAIRMENT: Primary | ICD-10-CM

## 2023-03-22 DIAGNOSIS — F32.A DEPRESSION, UNSPECIFIED DEPRESSION TYPE: ICD-10-CM

## 2023-03-22 PROCEDURE — 99214 OFFICE O/P EST MOD 30 MIN: CPT | Performed by: FAMILY MEDICINE

## 2023-03-22 PROCEDURE — 3077F SYST BP >= 140 MM HG: CPT | Performed by: FAMILY MEDICINE

## 2023-03-22 PROCEDURE — 3080F DIAST BP >= 90 MM HG: CPT | Performed by: FAMILY MEDICINE

## 2023-03-23 ENCOUNTER — CLINICAL DOCUMENTATION (OUTPATIENT)
Dept: GERIATRIC MEDICINE | Age: 58
End: 2023-03-23

## 2023-03-23 DIAGNOSIS — R41.3 MEMORY LOSS: ICD-10-CM

## 2023-03-23 DIAGNOSIS — R41.840 CONCENTRATION DEFICIT: ICD-10-CM

## 2023-03-23 DIAGNOSIS — R41.89 COGNITIVE IMPAIRMENT: ICD-10-CM

## 2023-03-28 ENCOUNTER — APPOINTMENT (OUTPATIENT)
Dept: INTERNAL MEDICINE | Age: 58
End: 2023-03-28

## 2023-03-28 ENCOUNTER — TELEPHONE (OUTPATIENT)
Dept: INTERNAL MEDICINE | Age: 58
End: 2023-03-28

## 2023-03-28 ENCOUNTER — OFFICE VISIT (OUTPATIENT)
Dept: INTERNAL MEDICINE | Age: 58
End: 2023-03-28

## 2023-03-28 DIAGNOSIS — E66.09 CLASS 1 OBESITY DUE TO EXCESS CALORIES WITH SERIOUS COMORBIDITY AND BODY MASS INDEX (BMI) OF 32.0 TO 32.9 IN ADULT: ICD-10-CM

## 2023-03-28 DIAGNOSIS — R41.89 COGNITIVE IMPAIRMENT: ICD-10-CM

## 2023-03-28 DIAGNOSIS — M48.061 SPINAL STENOSIS OF LUMBAR REGION, UNSPECIFIED WHETHER NEUROGENIC CLAUDICATION PRESENT: ICD-10-CM

## 2023-03-28 DIAGNOSIS — F32.0 MILD MAJOR DEPRESSION (CMD): ICD-10-CM

## 2023-03-28 DIAGNOSIS — I10 BENIGN ESSENTIAL HYPERTENSION: ICD-10-CM

## 2023-03-28 DIAGNOSIS — M54.50 BILATERAL LOW BACK PAIN WITHOUT SCIATICA, UNSPECIFIED CHRONICITY: ICD-10-CM

## 2023-03-28 DIAGNOSIS — D64.9 ANEMIA, UNSPECIFIED TYPE: Primary | ICD-10-CM

## 2023-03-28 DIAGNOSIS — M54.32 SCIATICA OF LEFT SIDE: ICD-10-CM

## 2023-03-28 DIAGNOSIS — Z23 NEED FOR PROPHYLACTIC VACCINATION AGAINST HEPATITIS B VIRUS: ICD-10-CM

## 2023-03-28 DIAGNOSIS — D12.6 TUBULAR ADENOMA OF COLON: ICD-10-CM

## 2023-03-28 DIAGNOSIS — N40.1 BPH WITH OBSTRUCTION/LOWER URINARY TRACT SYMPTOMS: ICD-10-CM

## 2023-03-28 DIAGNOSIS — J45.20 MILD INTERMITTENT ASTHMA WITHOUT COMPLICATION: ICD-10-CM

## 2023-03-28 DIAGNOSIS — C92.01 ACUTE MYELOBLASTIC LEUKEMIA IN REMISSION (CMD): ICD-10-CM

## 2023-03-28 DIAGNOSIS — R51.9 ACUTE NONINTRACTABLE HEADACHE, UNSPECIFIED HEADACHE TYPE: ICD-10-CM

## 2023-03-28 DIAGNOSIS — C82.03 FOLLICULAR LYMPHOMA GRADE I OF INTRA-ABDOMINAL LYMPH NODES (CMD): ICD-10-CM

## 2023-03-28 DIAGNOSIS — C92.01 ACUTE MYELOID LEUKEMIA IN REMISSION (CMD): ICD-10-CM

## 2023-03-28 DIAGNOSIS — N40.0 BPH WITHOUT OBSTRUCTION/LOWER URINARY TRACT SYMPTOMS: ICD-10-CM

## 2023-03-28 DIAGNOSIS — N13.8 BPH WITH OBSTRUCTION/LOWER URINARY TRACT SYMPTOMS: ICD-10-CM

## 2023-03-28 DIAGNOSIS — Z00.00 ENCOUNTER FOR GENERAL HEALTH EXAMINATION: ICD-10-CM

## 2023-03-28 DIAGNOSIS — R31.21 ASYMPTOMATIC MICROSCOPIC HEMATURIA: ICD-10-CM

## 2023-03-28 PROCEDURE — 99215 OFFICE O/P EST HI 40 MIN: CPT | Performed by: INTERNAL MEDICINE

## 2023-03-28 RX ORDER — LOSARTAN POTASSIUM 50 MG/1
50 TABLET ORAL DAILY
Qty: 90 TABLET | Refills: 3 | Status: SHIPPED | OUTPATIENT
Start: 2023-03-28

## 2023-03-28 ASSESSMENT — ENCOUNTER SYMPTOMS
ADENOPATHY: 0
EYE DISCHARGE: 0
SINUS PRESSURE: 0
TREMORS: 0
NUMBNESS: 0
ABDOMINAL PAIN: 0
DIARRHEA: 0
SHORTNESS OF BREATH: 0
EYE PAIN: 0
TROUBLE SWALLOWING: 0
SORE THROAT: 0
PHOTOPHOBIA: 0
CONFUSION: 0
SLEEP DISTURBANCE: 0
FACIAL SWELLING: 0
BRUISES/BLEEDS EASILY: 0
VOMITING: 0
RECTAL PAIN: 0
CONSTIPATION: 0
CHEST TIGHTNESS: 0
FATIGUE: 0
FEVER: 0
HEADACHES: 0
SEIZURES: 0

## 2023-03-28 ASSESSMENT — PAIN SCALES - GENERAL: PAINLEVEL: 8

## 2023-03-28 ASSESSMENT — PATIENT HEALTH QUESTIONNAIRE - PHQ9
CLINICAL INTERPRETATION OF PHQ2 SCORE: NO FURTHER SCREENING NEEDED
2. FEELING DOWN, DEPRESSED OR HOPELESS: NOT AT ALL
SUM OF ALL RESPONSES TO PHQ9 QUESTIONS 1 AND 2: 0
1. LITTLE INTEREST OR PLEASURE IN DOING THINGS: NOT AT ALL
SUM OF ALL RESPONSES TO PHQ9 QUESTIONS 1 AND 2: 0

## 2023-03-31 ENCOUNTER — CLINICAL DOCUMENTATION (OUTPATIENT)
Dept: GERIATRIC MEDICINE | Age: 58
End: 2023-03-31

## 2023-03-31 ENCOUNTER — LAB SERVICES (OUTPATIENT)
Dept: LAB | Age: 58
End: 2023-03-31

## 2023-03-31 DIAGNOSIS — N40.1 BPH WITH OBSTRUCTION/LOWER URINARY TRACT SYMPTOMS: ICD-10-CM

## 2023-03-31 DIAGNOSIS — D64.9 ANEMIA, UNSPECIFIED TYPE: ICD-10-CM

## 2023-03-31 DIAGNOSIS — R31.21 ASYMPTOMATIC MICROSCOPIC HEMATURIA: ICD-10-CM

## 2023-03-31 DIAGNOSIS — Z00.00 ENCOUNTER FOR GENERAL HEALTH EXAMINATION: ICD-10-CM

## 2023-03-31 DIAGNOSIS — R82.90 ABNORMAL URINE ODOR: ICD-10-CM

## 2023-03-31 DIAGNOSIS — Z23 NEED FOR PROPHYLACTIC VACCINATION AGAINST HEPATITIS B VIRUS: ICD-10-CM

## 2023-03-31 DIAGNOSIS — I10 BENIGN ESSENTIAL HYPERTENSION: ICD-10-CM

## 2023-03-31 DIAGNOSIS — N13.8 BPH WITH OBSTRUCTION/LOWER URINARY TRACT SYMPTOMS: ICD-10-CM

## 2023-03-31 DIAGNOSIS — D12.6 TUBULAR ADENOMA OF COLON: ICD-10-CM

## 2023-03-31 DIAGNOSIS — N39.0 URINARY TRACT INFECTION WITHOUT HEMATURIA, SITE UNSPECIFIED: ICD-10-CM

## 2023-03-31 DIAGNOSIS — N20.0 NEPHROLITHIASIS: ICD-10-CM

## 2023-03-31 DIAGNOSIS — N40.0 BPH WITHOUT OBSTRUCTION/LOWER URINARY TRACT SYMPTOMS: ICD-10-CM

## 2023-03-31 LAB
ALBUMIN SERPL-MCNC: 4 G/DL (ref 3.6–5.1)
ALBUMIN/GLOB SERPL: 1.4 {RATIO} (ref 1–2.4)
ALP SERPL-CCNC: 104 UNITS/L (ref 45–117)
ALT SERPL-CCNC: 31 UNITS/L
ANION GAP SERPL CALC-SCNC: 8 MMOL/L (ref 7–19)
APPEARANCE UR: CLEAR
AST SERPL-CCNC: 23 UNITS/L
BACTERIA #/AREA URNS HPF: NORMAL /HPF
BASOPHILS # BLD: 0 K/MCL (ref 0–0.3)
BASOPHILS NFR BLD: 1 %
BILIRUB SERPL-MCNC: 1 MG/DL (ref 0.2–1)
BILIRUB UR QL STRIP: NEGATIVE
BUN SERPL-MCNC: 8 MG/DL (ref 6–20)
BUN/CREAT SERPL: 11 (ref 7–25)
CALCIUM SERPL-MCNC: 9.1 MG/DL (ref 8.4–10.2)
CHLORIDE SERPL-SCNC: 108 MMOL/L (ref 97–110)
CHOLEST SERPL-MCNC: 195 MG/DL
CHOLEST/HDLC SERPL: 3.3 {RATIO}
CO2 SERPL-SCNC: 28 MMOL/L (ref 21–32)
COLOR UR: NORMAL
CREAT SERPL-MCNC: 0.75 MG/DL (ref 0.67–1.17)
DEPRECATED RDW RBC: 43 FL (ref 39–50)
EOSINOPHIL # BLD: 0.2 K/MCL (ref 0–0.5)
EOSINOPHIL NFR BLD: 4 %
ERYTHROCYTE [DISTWIDTH] IN BLOOD: 13.6 % (ref 11–15)
FASTING DURATION TIME PATIENT: 12 HOURS (ref 0–999)
FASTING DURATION TIME PATIENT: 12 HOURS (ref 0–999)
GFR SERPLBLD BASED ON 1.73 SQ M-ARVRAT: >90 ML/MIN
GLOBULIN SER-MCNC: 2.8 G/DL (ref 2–4)
GLUCOSE SERPL-MCNC: 93 MG/DL (ref 70–99)
GLUCOSE UR STRIP-MCNC: NEGATIVE MG/DL
HBV SURFACE AB SER QL: NEGATIVE
HCT VFR BLD CALC: 46 % (ref 39–51)
HDLC SERPL-MCNC: 60 MG/DL
HGB BLD-MCNC: 15.2 G/DL (ref 13–17)
HGB UR QL STRIP: NEGATIVE
HYALINE CASTS #/AREA URNS LPF: NORMAL /LPF
IMM GRANULOCYTES # BLD AUTO: 0 K/MCL (ref 0–0.2)
IMM GRANULOCYTES # BLD: 0 %
KETONES UR STRIP-MCNC: NEGATIVE MG/DL
LDLC SERPL CALC-MCNC: 123 MG/DL
LEUKOCYTE ESTERASE UR QL STRIP: NEGATIVE
LYMPHOCYTES # BLD: 1.7 K/MCL (ref 1–4)
LYMPHOCYTES NFR BLD: 25 %
MCH RBC QN AUTO: 28.6 PG (ref 26–34)
MCHC RBC AUTO-ENTMCNC: 33 G/DL (ref 32–36.5)
MCV RBC AUTO: 86.6 FL (ref 78–100)
MONOCYTES # BLD: 0.5 K/MCL (ref 0.3–0.9)
MONOCYTES NFR BLD: 7 %
MUCOUS THREADS URNS QL MICRO: PRESENT
NEUTROPHILS # BLD: 4.4 K/MCL (ref 1.8–7.7)
NEUTROPHILS NFR BLD: 63 %
NITRITE UR QL STRIP: NEGATIVE
NONHDLC SERPL-MCNC: 135 MG/DL
NRBC BLD MANUAL-RTO: 0 /100 WBC
PH UR STRIP: 6 [PH] (ref 5–7)
PLATELET # BLD AUTO: 204 K/MCL (ref 140–450)
POTASSIUM SERPL-SCNC: 4 MMOL/L (ref 3.4–5.1)
PROT SERPL-MCNC: 6.8 G/DL (ref 6.4–8.2)
PROT UR STRIP-MCNC: NEGATIVE MG/DL
PSA SERPL-MCNC: 1.54 NG/ML
RBC # BLD: 5.31 MIL/MCL (ref 4.5–5.9)
RBC #/AREA URNS HPF: NORMAL /HPF
SODIUM SERPL-SCNC: 140 MMOL/L (ref 135–145)
SP GR UR STRIP: 1.02 (ref 1–1.03)
SQUAMOUS #/AREA URNS HPF: NORMAL /HPF
TRIGL SERPL-MCNC: 60 MG/DL
TSH SERPL-ACNC: 0.7 MCUNITS/ML (ref 0.35–5)
UROBILINOGEN UR STRIP-MCNC: 0.2 MG/DL
WBC # BLD: 6.9 K/MCL (ref 4.2–11)
WBC #/AREA URNS HPF: NORMAL /HPF

## 2023-03-31 PROCEDURE — 81001 URINALYSIS AUTO W/SCOPE: CPT | Performed by: INTERNAL MEDICINE

## 2023-03-31 PROCEDURE — 84153 ASSAY OF PSA TOTAL: CPT | Performed by: INTERNAL MEDICINE

## 2023-03-31 PROCEDURE — 36415 COLL VENOUS BLD VENIPUNCTURE: CPT | Performed by: INTERNAL MEDICINE

## 2023-03-31 PROCEDURE — 80050 GENERAL HEALTH PANEL: CPT | Performed by: INTERNAL MEDICINE

## 2023-03-31 PROCEDURE — 80061 LIPID PANEL: CPT | Performed by: INTERNAL MEDICINE

## 2023-03-31 PROCEDURE — 86706 HEP B SURFACE ANTIBODY: CPT | Performed by: INTERNAL MEDICINE

## 2023-04-01 ENCOUNTER — CLINICAL DOCUMENTATION (OUTPATIENT)
Dept: GERIATRIC MEDICINE | Age: 58
End: 2023-04-01

## 2023-04-01 DIAGNOSIS — E66.09 CLASS 1 OBESITY DUE TO EXCESS CALORIES WITH SERIOUS COMORBIDITY AND BODY MASS INDEX (BMI) OF 32.0 TO 32.9 IN ADULT: ICD-10-CM

## 2023-04-01 DIAGNOSIS — Z23 NEED FOR PROPHYLACTIC VACCINATION AGAINST HEPATITIS B VIRUS: ICD-10-CM

## 2023-04-03 RX ORDER — ALBUTEROL SULFATE 90 UG/1
2 AEROSOL, METERED RESPIRATORY (INHALATION) EVERY 4 HOURS PRN
Qty: 6.7 EACH | Refills: 3 | Status: SHIPPED | OUTPATIENT
Start: 2023-04-03

## 2023-04-12 ENCOUNTER — TELEPHONE (OUTPATIENT)
Dept: INTERNAL MEDICINE | Age: 58
End: 2023-04-12

## 2023-04-13 ENCOUNTER — NURSE ONLY (OUTPATIENT)
Dept: GERIATRIC MEDICINE | Age: 58
End: 2023-04-13

## 2023-04-13 DIAGNOSIS — Z23 NEED FOR PROPHYLACTIC VACCINATION AGAINST HEPATITIS B VIRUS: Primary | ICD-10-CM

## 2023-04-13 PROCEDURE — X1094 NO CHARGE VISIT: HCPCS | Performed by: INTERNAL MEDICINE

## 2023-04-13 PROCEDURE — 90471 IMMUNIZATION ADMIN: CPT | Performed by: INTERNAL MEDICINE

## 2023-04-13 PROCEDURE — 90746 HEPB VACCINE 3 DOSE ADULT IM: CPT | Performed by: INTERNAL MEDICINE

## 2023-04-23 DIAGNOSIS — G25.81 RESTLESS LEGS SYNDROME: ICD-10-CM

## 2023-04-24 RX ORDER — ROPINIROLE 1 MG/1
TABLET, FILM COATED ORAL
Qty: 172 TABLET | Refills: 1 | Status: SHIPPED | OUTPATIENT
Start: 2023-04-24 | End: 2023-09-11 | Stop reason: SDUPTHER

## 2023-05-02 ENCOUNTER — HOSPITAL ENCOUNTER (OUTPATIENT)
Dept: PET IMAGING | Age: 58
Discharge: HOME | End: 2023-05-02
Attending: INTERNAL MEDICINE

## 2023-05-02 DIAGNOSIS — C82.03 FOLLICULAR LYMPHOMA GRADE I OF INTRA-ABDOMINAL LYMPH NODES (CMD): ICD-10-CM

## 2023-05-02 LAB — GLUCOSE BLDC GLUCOMTR-MCNC: 115 MG/DL (ref 70–99)

## 2023-05-02 PROCEDURE — A9552 F18 FDG: HCPCS | Performed by: INTERNAL MEDICINE

## 2023-05-02 PROCEDURE — 10006150 HB RX 343: Performed by: INTERNAL MEDICINE

## 2023-05-02 PROCEDURE — 82962 GLUCOSE BLOOD TEST: CPT

## 2023-05-02 PROCEDURE — G1004 CDSM NDSC: HCPCS

## 2023-05-02 PROCEDURE — 78815 PET IMAGE W/CT SKULL-THIGH: CPT

## 2023-05-02 RX ORDER — FLUDEOXYGLUCOSE F-18 300 MCI/ML
10.98 INJECTION INTRAVENOUS ONCE
Status: COMPLETED | OUTPATIENT
Start: 2023-05-02 | End: 2023-05-02

## 2023-05-02 RX ADMIN — FLUDEOXYGLUCOSE F-18 10.98 MILLICURIE: 300 INJECTION INTRAVENOUS at 08:16

## 2023-05-11 ENCOUNTER — LAB SERVICES (OUTPATIENT)
Dept: LAB | Age: 58
End: 2023-05-11

## 2023-05-11 ENCOUNTER — OFFICE VISIT (OUTPATIENT)
Dept: HEMATOLOGY/ONCOLOGY | Age: 58
End: 2023-05-11
Attending: INTERNAL MEDICINE

## 2023-05-11 VITALS
DIASTOLIC BLOOD PRESSURE: 96 MMHG | OXYGEN SATURATION: 99 % | HEART RATE: 71 BPM | BODY MASS INDEX: 31.55 KG/M2 | SYSTOLIC BLOOD PRESSURE: 166 MMHG | RESPIRATION RATE: 16 BRPM | WEIGHT: 189.6 LBS | TEMPERATURE: 98.1 F

## 2023-05-11 DIAGNOSIS — C82.03 FOLLICULAR LYMPHOMA GRADE I OF INTRA-ABDOMINAL LYMPH NODES (CMD): Primary | ICD-10-CM

## 2023-05-11 DIAGNOSIS — C82.03 FOLLICULAR LYMPHOMA GRADE I OF INTRA-ABDOMINAL LYMPH NODES (CMD): ICD-10-CM

## 2023-05-11 LAB
ALBUMIN SERPL-MCNC: 3.7 G/DL (ref 3.6–5.1)
ALBUMIN/GLOB SERPL: 1.1 {RATIO} (ref 1–2.4)
ALP SERPL-CCNC: 108 UNITS/L (ref 45–117)
ALT SERPL-CCNC: 30 UNITS/L
ANION GAP SERPL CALC-SCNC: 5 MMOL/L (ref 7–19)
AST SERPL-CCNC: 17 UNITS/L
BASOPHILS # BLD: 0.1 K/MCL (ref 0–0.3)
BASOPHILS NFR BLD: 1 %
BILIRUB SERPL-MCNC: 0.9 MG/DL (ref 0.2–1)
BUN SERPL-MCNC: 7 MG/DL (ref 6–20)
BUN/CREAT SERPL: 9 (ref 7–25)
CALCIUM SERPL-MCNC: 9.2 MG/DL (ref 8.4–10.2)
CHLORIDE SERPL-SCNC: 109 MMOL/L (ref 97–110)
CO2 SERPL-SCNC: 29 MMOL/L (ref 21–32)
CREAT SERPL-MCNC: 0.79 MG/DL (ref 0.67–1.17)
DEPRECATED RDW RBC: 43.4 FL (ref 39–50)
EOSINOPHIL # BLD: 0.4 K/MCL (ref 0–0.5)
EOSINOPHIL NFR BLD: 5 %
ERYTHROCYTE [DISTWIDTH] IN BLOOD: 13.5 % (ref 11–15)
FASTING DURATION TIME PATIENT: 0 HOURS (ref 0–999)
GFR SERPLBLD BASED ON 1.73 SQ M-ARVRAT: >90 ML/MIN
GLOBULIN SER-MCNC: 3.5 G/DL (ref 2–4)
GLUCOSE SERPL-MCNC: 94 MG/DL (ref 70–99)
HCT VFR BLD CALC: 45.7 % (ref 39–51)
HGB BLD-MCNC: 15.1 G/DL (ref 13–17)
IGA SERPL-MCNC: 182 MG/DL (ref 70–400)
IGG SERPL-MCNC: 808 MG/DL (ref 700–1600)
IGM SERPL-MCNC: 31 MG/DL (ref 40–230)
IMM GRANULOCYTES # BLD AUTO: 0 K/MCL (ref 0–0.2)
IMM GRANULOCYTES # BLD: 0 %
LYMPHOCYTES # BLD: 1.6 K/MCL (ref 1–4)
LYMPHOCYTES NFR BLD: 23 %
MCH RBC QN AUTO: 29 PG (ref 26–34)
MCHC RBC AUTO-ENTMCNC: 33 G/DL (ref 32–36.5)
MCV RBC AUTO: 87.9 FL (ref 78–100)
MONOCYTES # BLD: 0.6 K/MCL (ref 0.3–0.9)
MONOCYTES NFR BLD: 8 %
NEUTROPHILS # BLD: 4.6 K/MCL (ref 1.8–7.7)
NEUTROPHILS NFR BLD: 63 %
NRBC BLD MANUAL-RTO: 0 /100 WBC
PLATELET # BLD AUTO: 174 K/MCL (ref 140–450)
POTASSIUM SERPL-SCNC: 3.9 MMOL/L (ref 3.4–5.1)
PROT SERPL-MCNC: 7.2 G/DL (ref 6.4–8.2)
RBC # BLD: 5.2 MIL/MCL (ref 4.5–5.9)
SODIUM SERPL-SCNC: 139 MMOL/L (ref 135–145)
WBC # BLD: 7.3 K/MCL (ref 4.2–11)

## 2023-05-11 PROCEDURE — 3077F SYST BP >= 140 MM HG: CPT | Performed by: INTERNAL MEDICINE

## 2023-05-11 PROCEDURE — 99211 OFF/OP EST MAY X REQ PHY/QHP: CPT

## 2023-05-11 PROCEDURE — 80053 COMPREHEN METABOLIC PANEL: CPT | Performed by: INTERNAL MEDICINE

## 2023-05-11 PROCEDURE — 85025 COMPLETE CBC W/AUTO DIFF WBC: CPT | Performed by: INTERNAL MEDICINE

## 2023-05-11 PROCEDURE — 3080F DIAST BP >= 90 MM HG: CPT | Performed by: INTERNAL MEDICINE

## 2023-05-11 PROCEDURE — 36415 COLL VENOUS BLD VENIPUNCTURE: CPT | Performed by: INTERNAL MEDICINE

## 2023-05-11 PROCEDURE — 99213 OFFICE O/P EST LOW 20 MIN: CPT | Performed by: INTERNAL MEDICINE

## 2023-05-11 PROCEDURE — 82784 ASSAY IGA/IGD/IGG/IGM EACH: CPT | Performed by: INTERNAL MEDICINE

## 2023-05-11 ASSESSMENT — PAIN SCALES - GENERAL: PAINLEVEL: 6

## 2023-05-18 ENCOUNTER — OFFICE VISIT (OUTPATIENT)
Dept: INTERNAL MEDICINE | Age: 58
End: 2023-05-18

## 2023-05-18 VITALS
HEART RATE: 86 BPM | OXYGEN SATURATION: 98 % | DIASTOLIC BLOOD PRESSURE: 91 MMHG | HEIGHT: 66 IN | SYSTOLIC BLOOD PRESSURE: 162 MMHG | BODY MASS INDEX: 29.73 KG/M2 | TEMPERATURE: 97.2 F | WEIGHT: 185 LBS

## 2023-05-18 DIAGNOSIS — J45.20 MILD INTERMITTENT ASTHMA WITHOUT COMPLICATION: ICD-10-CM

## 2023-05-18 DIAGNOSIS — I10 BENIGN ESSENTIAL HYPERTENSION: ICD-10-CM

## 2023-05-18 DIAGNOSIS — Z23 NEED FOR PROPHYLACTIC VACCINATION AGAINST HEPATITIS B VIRUS: Primary | ICD-10-CM

## 2023-05-18 DIAGNOSIS — D64.9 ANEMIA, UNSPECIFIED TYPE: ICD-10-CM

## 2023-05-18 DIAGNOSIS — T45.1X5A POLYNEUROPATHY FOLLOWING CHEMOTHERAPY (CMD): ICD-10-CM

## 2023-05-18 DIAGNOSIS — C92.01 ACUTE MYELOID LEUKEMIA IN REMISSION (CMD): ICD-10-CM

## 2023-05-18 DIAGNOSIS — N20.0 NEPHROLITHIASIS: ICD-10-CM

## 2023-05-18 DIAGNOSIS — F32.0 MILD MAJOR DEPRESSION (CMD): ICD-10-CM

## 2023-05-18 DIAGNOSIS — C92.01 ACUTE MYELOBLASTIC LEUKEMIA IN REMISSION (CMD): ICD-10-CM

## 2023-05-18 DIAGNOSIS — C82.03 FOLLICULAR LYMPHOMA GRADE I OF INTRA-ABDOMINAL LYMPH NODES (CMD): ICD-10-CM

## 2023-05-18 DIAGNOSIS — E66.09 CLASS 1 OBESITY DUE TO EXCESS CALORIES WITHOUT SERIOUS COMORBIDITY WITH BODY MASS INDEX (BMI) OF 30.0 TO 30.9 IN ADULT: ICD-10-CM

## 2023-05-18 DIAGNOSIS — D12.6 TUBULAR ADENOMA OF COLON: ICD-10-CM

## 2023-05-18 DIAGNOSIS — G47.33 OBSTRUCTIVE SLEEP APNEA: ICD-10-CM

## 2023-05-18 DIAGNOSIS — Z00.00 ENCOUNTER FOR GENERAL HEALTH EXAMINATION: ICD-10-CM

## 2023-05-18 DIAGNOSIS — C95.91 LEUKEMIA IN REMISSION, UNSPECIFIED LEUKEMIA TYPE (CMD): ICD-10-CM

## 2023-05-18 DIAGNOSIS — G62.0 POLYNEUROPATHY FOLLOWING CHEMOTHERAPY (CMD): ICD-10-CM

## 2023-05-18 DIAGNOSIS — H90.5 SENSORINEURAL HEARING LOSS (SNHL), UNSPECIFIED LATERALITY: ICD-10-CM

## 2023-05-18 PROBLEM — C92.00 ACUTE MYELOID LEUKEMIA (CMD): Status: RESOLVED | Noted: 2020-06-23 | Resolved: 2023-05-18

## 2023-05-18 PROCEDURE — 3080F DIAST BP >= 90 MM HG: CPT | Performed by: INTERNAL MEDICINE

## 2023-05-18 PROCEDURE — 90471 IMMUNIZATION ADMIN: CPT | Performed by: INTERNAL MEDICINE

## 2023-05-18 PROCEDURE — 99214 OFFICE O/P EST MOD 30 MIN: CPT | Performed by: INTERNAL MEDICINE

## 2023-05-18 PROCEDURE — 99396 PREV VISIT EST AGE 40-64: CPT | Performed by: INTERNAL MEDICINE

## 2023-05-18 PROCEDURE — 90746 HEPB VACCINE 3 DOSE ADULT IM: CPT | Performed by: INTERNAL MEDICINE

## 2023-05-18 PROCEDURE — 3077F SYST BP >= 140 MM HG: CPT | Performed by: INTERNAL MEDICINE

## 2023-05-18 RX ORDER — GABAPENTIN 300 MG/1
300 CAPSULE ORAL DAILY
Qty: 90 CAPSULE | Refills: 3 | Status: SHIPPED | OUTPATIENT
Start: 2023-05-18

## 2023-05-18 RX ORDER — MONTELUKAST SODIUM 10 MG/1
10 TABLET ORAL DAILY
COMMUNITY

## 2023-05-18 ASSESSMENT — ENCOUNTER SYMPTOMS
FATIGUE: 0
SEIZURES: 0
TREMORS: 0
TROUBLE SWALLOWING: 0
VOMITING: 0
FEVER: 0
HEADACHES: 0
NUMBNESS: 0
ABDOMINAL PAIN: 0
EYE PAIN: 0
PHOTOPHOBIA: 0
DIARRHEA: 0
BRUISES/BLEEDS EASILY: 0
EYE DISCHARGE: 0
SLEEP DISTURBANCE: 0
FACIAL SWELLING: 0
SHORTNESS OF BREATH: 0
ADENOPATHY: 0
SINUS PRESSURE: 0
RECTAL PAIN: 0
CONSTIPATION: 0
CONFUSION: 0
SORE THROAT: 0
CHEST TIGHTNESS: 0

## 2023-05-18 ASSESSMENT — PATIENT HEALTH QUESTIONNAIRE - PHQ9
1. LITTLE INTEREST OR PLEASURE IN DOING THINGS: NOT AT ALL
CLINICAL INTERPRETATION OF PHQ2 SCORE: NO FURTHER SCREENING NEEDED
2. FEELING DOWN, DEPRESSED OR HOPELESS: NOT AT ALL
SUM OF ALL RESPONSES TO PHQ9 QUESTIONS 1 AND 2: 0
SUM OF ALL RESPONSES TO PHQ9 QUESTIONS 1 AND 2: 0

## 2023-05-18 ASSESSMENT — PAIN SCALES - GENERAL: PAINLEVEL: 8

## 2023-05-20 ENCOUNTER — E-ADVICE (OUTPATIENT)
Dept: INTERNAL MEDICINE | Age: 58
End: 2023-05-20

## 2023-05-24 ENCOUNTER — TELEPHONE (OUTPATIENT)
Dept: INTERNAL MEDICINE | Age: 58
End: 2023-05-24

## 2023-06-12 ENCOUNTER — TELEPHONE (OUTPATIENT)
Dept: INTERNAL MEDICINE | Age: 58
End: 2023-06-12

## 2023-06-14 RX ORDER — DULOXETIN HYDROCHLORIDE 30 MG/1
CAPSULE, DELAYED RELEASE ORAL
Qty: 270 CAPSULE | OUTPATIENT
Start: 2023-06-14

## 2023-06-23 ENCOUNTER — TELEPHONE (OUTPATIENT)
Dept: INTERNAL MEDICINE | Age: 58
End: 2023-06-23

## 2023-06-23 DIAGNOSIS — G47.33 OBSTRUCTIVE SLEEP APNEA: ICD-10-CM

## 2023-06-23 RX ORDER — SEMAGLUTIDE 0.68 MG/ML
0.25 INJECTION, SOLUTION SUBCUTANEOUS
Qty: 3 ML | Refills: 1 | Status: SHIPPED | OUTPATIENT
Start: 2023-06-26 | End: 2023-09-14 | Stop reason: SDUPTHER

## 2023-06-25 RX ORDER — MODAFINIL 200 MG/1
200 TABLET ORAL 2 TIMES DAILY
Qty: 180 TABLET | Refills: 1 | Status: SHIPPED | OUTPATIENT
Start: 2023-06-25 | End: 2023-09-25

## 2023-06-26 ENCOUNTER — TELEPHONE (OUTPATIENT)
Dept: SLEEP MEDICINE | Age: 58
End: 2023-06-26

## 2023-07-05 ENCOUNTER — E-ADVICE (OUTPATIENT)
Dept: INTERNAL MEDICINE | Age: 58
End: 2023-07-05

## 2023-07-11 RX ORDER — DULOXETIN HYDROCHLORIDE 30 MG/1
CAPSULE, DELAYED RELEASE ORAL
Qty: 270 CAPSULE | OUTPATIENT
Start: 2023-07-11

## 2023-07-17 ENCOUNTER — CLINICAL DOCUMENTATION (OUTPATIENT)
Dept: GERIATRIC MEDICINE | Age: 58
End: 2023-07-17

## 2023-07-17 ENCOUNTER — TELEPHONE (OUTPATIENT)
Dept: GERIATRIC MEDICINE | Age: 58
End: 2023-07-17

## 2023-07-17 DIAGNOSIS — F43.21 GRIEF REACTION: ICD-10-CM

## 2023-07-17 RX ORDER — CLONAZEPAM 0.5 MG/1
0.5 TABLET ORAL DAILY PRN
Qty: 30 TABLET | Refills: 0 | Status: SHIPPED | OUTPATIENT
Start: 2023-07-17

## 2023-08-08 RX ORDER — TAMSULOSIN HYDROCHLORIDE 0.4 MG/1
CAPSULE ORAL
Qty: 90 CAPSULE | Refills: 3 | Status: SHIPPED | OUTPATIENT
Start: 2023-08-08

## 2023-09-06 RX ORDER — SEMAGLUTIDE 0.68 MG/ML
0.25 INJECTION, SOLUTION SUBCUTANEOUS
Refills: 1 | OUTPATIENT
Start: 2023-09-06

## 2023-09-09 DIAGNOSIS — G25.81 RESTLESS LEGS SYNDROME: ICD-10-CM

## 2023-09-11 RX ORDER — ROPINIROLE 1 MG/1
2 TABLET, FILM COATED ORAL NIGHTLY
Qty: 172 TABLET | Refills: 1 | Status: SHIPPED | OUTPATIENT
Start: 2023-09-11

## 2023-09-13 ENCOUNTER — TELEPHONE (OUTPATIENT)
Dept: INTERNAL MEDICINE | Age: 58
End: 2023-09-13

## 2023-09-14 RX ORDER — SEMAGLUTIDE 0.68 MG/ML
0.25 INJECTION, SOLUTION SUBCUTANEOUS
Refills: 1 | OUTPATIENT
Start: 2023-09-18

## 2023-09-14 RX ORDER — SEMAGLUTIDE 0.68 MG/ML
0.5 INJECTION, SOLUTION SUBCUTANEOUS
Qty: 3 ML | Refills: 1 | Status: SHIPPED | OUTPATIENT
Start: 2023-09-14 | End: 2023-10-25 | Stop reason: ALTCHOICE

## 2023-09-18 ENCOUNTER — TELEPHONE (OUTPATIENT)
Dept: NEUROLOGY | Age: 58
End: 2023-09-18

## 2023-09-25 DIAGNOSIS — G47.33 OBSTRUCTIVE SLEEP APNEA: ICD-10-CM

## 2023-09-25 RX ORDER — MODAFINIL 200 MG/1
200 TABLET ORAL 2 TIMES DAILY
Qty: 60 TABLET | Refills: 1 | Status: SHIPPED | OUTPATIENT
Start: 2023-09-25

## 2023-09-25 RX ORDER — MODAFINIL 200 MG/1
200 TABLET ORAL 2 TIMES DAILY
Qty: 60 TABLET | Refills: 1 | Status: SHIPPED | OUTPATIENT
Start: 2023-09-25 | End: 2023-09-25 | Stop reason: SDUPTHER

## 2023-10-25 ENCOUNTER — TELEPHONE (OUTPATIENT)
Dept: INTERNAL MEDICINE | Age: 58
End: 2023-10-25

## 2023-11-22 ENCOUNTER — APPOINTMENT (OUTPATIENT)
Dept: LAB | Age: 58
End: 2023-11-22

## 2023-11-22 ENCOUNTER — OFFICE VISIT (OUTPATIENT)
Dept: HEMATOLOGY/ONCOLOGY | Age: 58
End: 2023-11-22
Attending: INTERNAL MEDICINE

## 2023-11-22 ENCOUNTER — CLINICAL DOCUMENTATION (OUTPATIENT)
Dept: GERIATRIC MEDICINE | Age: 58
End: 2023-11-22

## 2023-11-22 VITALS
TEMPERATURE: 97.3 F | HEART RATE: 68 BPM | DIASTOLIC BLOOD PRESSURE: 81 MMHG | OXYGEN SATURATION: 99 % | RESPIRATION RATE: 16 BRPM | WEIGHT: 173.72 LBS | SYSTOLIC BLOOD PRESSURE: 137 MMHG | BODY MASS INDEX: 28.47 KG/M2

## 2023-11-22 DIAGNOSIS — C92.01 ACUTE MYELOBLASTIC LEUKEMIA IN REMISSION (CMD): ICD-10-CM

## 2023-11-22 DIAGNOSIS — C82.03 FOLLICULAR LYMPHOMA GRADE I OF INTRA-ABDOMINAL LYMPH NODES (CMD): ICD-10-CM

## 2023-11-22 DIAGNOSIS — C82.93 FOLLICULAR LYMPHOMA OF INTRA-ABDOMINAL LYMPH NODES, UNSPECIFIED FOLLICULAR LYMPHOMA TYPE (CMD): ICD-10-CM

## 2023-11-22 DIAGNOSIS — C82.03 FOLLICULAR LYMPHOMA GRADE I OF INTRA-ABDOMINAL LYMPH NODES (CMD): Primary | ICD-10-CM

## 2023-11-22 PROBLEM — F32.0 MILD MAJOR DEPRESSION (CMD): Status: RESOLVED | Noted: 2023-03-28 | Resolved: 2023-11-22

## 2023-11-22 PROBLEM — Z23 NEED FOR PROPHYLACTIC VACCINATION AGAINST HEPATITIS B VIRUS: Status: RESOLVED | Noted: 2023-01-26 | Resolved: 2023-11-22

## 2023-11-22 PROBLEM — L30.9 DERMATITIS: Status: RESOLVED | Noted: 2022-02-17 | Resolved: 2023-11-22

## 2023-11-22 PROBLEM — Z12.11 SPECIAL SCREENING FOR MALIGNANT NEOPLASMS, COLON: Status: RESOLVED | Noted: 2021-01-11 | Resolved: 2023-11-22

## 2023-11-22 PROBLEM — C82.90 FOLLICULAR LYMPHOMA (CMD): Status: RESOLVED | Noted: 2019-04-28 | Resolved: 2023-11-22

## 2023-11-22 PROBLEM — M54.32 SCIATICA OF LEFT SIDE: Status: RESOLVED | Noted: 2019-04-15 | Resolved: 2023-11-22

## 2023-11-22 PROBLEM — C85.90 LYMPHOMA (CMD): Status: RESOLVED | Noted: 2019-05-07 | Resolved: 2023-11-22

## 2023-11-22 PROBLEM — M54.59 INTRACTABLE LOW BACK PAIN: Status: RESOLVED | Noted: 2018-09-22 | Resolved: 2023-11-22

## 2023-11-22 PROBLEM — R50.9 FEVER: Status: RESOLVED | Noted: 2022-03-23 | Resolved: 2023-11-22

## 2023-11-22 PROBLEM — M25.519 SHOULDER JOINT PAIN: Status: RESOLVED | Noted: 2021-06-03 | Resolved: 2023-11-22

## 2023-11-22 PROBLEM — Z23 NEED FOR PNEUMOCOCCAL VACCINATION: Status: RESOLVED | Noted: 2020-11-13 | Resolved: 2023-11-22

## 2023-11-22 PROBLEM — Z23 NEED FOR SHINGLES VACCINE: Status: RESOLVED | Noted: 2020-11-13 | Resolved: 2023-11-22

## 2023-11-22 PROBLEM — Z00.00 ENCOUNTER FOR GENERAL HEALTH EXAMINATION: Status: RESOLVED | Noted: 2020-12-10 | Resolved: 2023-11-22

## 2023-11-22 PROBLEM — Z11.59 ENCOUNTER FOR HEPATITIS C SCREENING TEST FOR LOW RISK PATIENT: Status: RESOLVED | Noted: 2020-11-13 | Resolved: 2023-11-22

## 2023-11-22 PROBLEM — R19.00 INTRA-ABDOMINAL AND PELVIC SWELLING, MASS AND LUMP, UNSPECIFIED SITE: Status: RESOLVED | Noted: 2022-05-07 | Resolved: 2023-11-22

## 2023-11-22 PROBLEM — F43.21 GRIEF REACTION: Status: RESOLVED | Noted: 2023-07-17 | Resolved: 2023-11-22

## 2023-11-22 PROBLEM — M25.569 KNEE PAIN: Status: RESOLVED | Noted: 2022-01-20 | Resolved: 2023-11-22

## 2023-11-22 PROBLEM — R82.90 ABNORMAL URINE ODOR: Status: RESOLVED | Noted: 2021-02-17 | Resolved: 2023-11-22

## 2023-11-22 PROBLEM — Z23 NEED FOR COVID-19 VACCINE: Status: RESOLVED | Noted: 2022-01-20 | Resolved: 2023-11-22

## 2023-11-22 PROBLEM — M25.529 ELBOW PAIN: Status: RESOLVED | Noted: 2022-02-01 | Resolved: 2023-11-22

## 2023-11-22 PROBLEM — R51.9 HEADACHE: Status: RESOLVED | Noted: 2018-12-31 | Resolved: 2023-11-22

## 2023-11-22 LAB
ALBUMIN SERPL-MCNC: 4 G/DL (ref 3.6–5.1)
ALBUMIN/GLOB SERPL: 1.1 {RATIO} (ref 1–2.4)
ALP SERPL-CCNC: 114 UNITS/L (ref 45–117)
ALT SERPL-CCNC: 29 UNITS/L
ANION GAP SERPL CALC-SCNC: 10 MMOL/L (ref 7–19)
AST SERPL-CCNC: 23 UNITS/L
BASOPHILS # BLD: 0.1 K/MCL (ref 0–0.3)
BASOPHILS NFR BLD: 1 %
BILIRUB SERPL-MCNC: 1.1 MG/DL (ref 0.2–1)
BUN SERPL-MCNC: 8 MG/DL (ref 6–20)
BUN/CREAT SERPL: 8 (ref 7–25)
CALCIUM SERPL-MCNC: 9.6 MG/DL (ref 8.4–10.2)
CHLORIDE SERPL-SCNC: 106 MMOL/L (ref 97–110)
CO2 SERPL-SCNC: 27 MMOL/L (ref 21–32)
CREAT SERPL-MCNC: 0.96 MG/DL (ref 0.67–1.17)
DEPRECATED RDW RBC: 42 FL (ref 39–50)
EGFRCR SERPLBLD CKD-EPI 2021: >90 ML/MIN/{1.73_M2}
EOSINOPHIL # BLD: 0.3 K/MCL (ref 0–0.5)
EOSINOPHIL NFR BLD: 3 %
ERYTHROCYTE [DISTWIDTH] IN BLOOD: 13.2 % (ref 11–15)
FASTING DURATION TIME PATIENT: ABNORMAL H
GLOBULIN SER-MCNC: 3.6 G/DL (ref 2–4)
GLUCOSE SERPL-MCNC: 92 MG/DL (ref 70–99)
HCT VFR BLD CALC: 48 % (ref 39–51)
HGB BLD-MCNC: 16.1 G/DL (ref 13–17)
IGA SERPL-MCNC: 203 MG/DL (ref 70–400)
IGG SERPL-MCNC: 875 MG/DL (ref 700–1600)
IGM SERPL-MCNC: 40 MG/DL (ref 40–230)
IMM GRANULOCYTES # BLD AUTO: 0 K/MCL (ref 0–0.2)
IMM GRANULOCYTES # BLD: 0 %
LYMPHOCYTES # BLD: 2.5 K/MCL (ref 1–4)
LYMPHOCYTES NFR BLD: 32 %
MCH RBC QN AUTO: 29.2 PG (ref 26–34)
MCHC RBC AUTO-ENTMCNC: 33.5 G/DL (ref 32–36.5)
MCV RBC AUTO: 87 FL (ref 78–100)
MONOCYTES # BLD: 0.5 K/MCL (ref 0.3–0.9)
MONOCYTES NFR BLD: 6 %
NEUTROPHILS # BLD: 4.5 K/MCL (ref 1.8–7.7)
NEUTROPHILS NFR BLD: 58 %
NRBC BLD MANUAL-RTO: 0 /100 WBC
PLATELET # BLD AUTO: 176 K/MCL (ref 140–450)
POTASSIUM SERPL-SCNC: 4.1 MMOL/L (ref 3.4–5.1)
PROT SERPL-MCNC: 7.6 G/DL (ref 6.4–8.2)
RBC # BLD: 5.52 MIL/MCL (ref 4.5–5.9)
SODIUM SERPL-SCNC: 139 MMOL/L (ref 135–145)
WBC # BLD: 7.8 K/MCL (ref 4.2–11)

## 2023-11-22 PROCEDURE — 80053 COMPREHEN METABOLIC PANEL: CPT | Performed by: CLINICAL MEDICAL LABORATORY

## 2023-11-22 PROCEDURE — 85025 COMPLETE CBC W/AUTO DIFF WBC: CPT | Performed by: INTERNAL MEDICINE

## 2023-11-22 PROCEDURE — 36415 COLL VENOUS BLD VENIPUNCTURE: CPT | Performed by: INTERNAL MEDICINE

## 2023-11-22 PROCEDURE — 3079F DIAST BP 80-89 MM HG: CPT | Performed by: INTERNAL MEDICINE

## 2023-11-22 PROCEDURE — 82784 ASSAY IGA/IGD/IGG/IGM EACH: CPT | Performed by: CLINICAL MEDICAL LABORATORY

## 2023-11-22 PROCEDURE — 99214 OFFICE O/P EST MOD 30 MIN: CPT | Performed by: INTERNAL MEDICINE

## 2023-11-22 PROCEDURE — 99211 OFF/OP EST MAY X REQ PHY/QHP: CPT

## 2023-11-22 PROCEDURE — 3075F SYST BP GE 130 - 139MM HG: CPT | Performed by: INTERNAL MEDICINE

## 2023-11-22 ASSESSMENT — PATIENT HEALTH QUESTIONNAIRE - PHQ9
SUM OF ALL RESPONSES TO PHQ9 QUESTIONS 1 AND 2: 2
2. FEELING DOWN, DEPRESSED OR HOPELESS: MORE THAN HALF THE DAYS
SUM OF ALL RESPONSES TO PHQ9 QUESTIONS 1 AND 2: 2
1. LITTLE INTEREST OR PLEASURE IN DOING THINGS: NOT AT ALL
CLINICAL INTERPRETATION OF PHQ2 SCORE: NO FURTHER SCREENING NEEDED

## 2023-11-22 ASSESSMENT — PAIN SCALES - GENERAL: PAINLEVEL: 5

## 2023-12-01 ENCOUNTER — TELEPHONE (OUTPATIENT)
Dept: GERIATRIC MEDICINE | Age: 58
End: 2023-12-01

## 2023-12-01 ENCOUNTER — CLINICAL DOCUMENTATION (OUTPATIENT)
Dept: GERIATRIC MEDICINE | Age: 58
End: 2023-12-01

## 2023-12-01 PROBLEM — U07.1 COVID-19 VIRUS INFECTION: Status: ACTIVE | Noted: 2023-12-01

## 2023-12-01 RX ORDER — IPRATROPIUM BROMIDE AND ALBUTEROL SULFATE 2.5; .5 MG/3ML; MG/3ML
3 SOLUTION RESPIRATORY (INHALATION) EVERY 6 HOURS PRN
Qty: 360 ML | Refills: 1 | Status: SHIPPED | OUTPATIENT
Start: 2023-12-01

## 2024-01-08 ENCOUNTER — HOSPITAL ENCOUNTER (OUTPATIENT)
Age: 59
Discharge: HOME | End: 2024-01-08
Payer: COMMERCIAL

## 2024-01-08 ENCOUNTER — APPOINTMENT (OUTPATIENT)
Dept: GENERAL RADIOLOGY | Age: 59
End: 2024-01-08
Attending: NURSE PRACTITIONER
Payer: COMMERCIAL

## 2024-01-08 VITALS
WEIGHT: 178 LBS | DIASTOLIC BLOOD PRESSURE: 74 MMHG | OXYGEN SATURATION: 98 % | HEART RATE: 78 BPM | HEIGHT: 65 IN | TEMPERATURE: 98 F | RESPIRATION RATE: 20 BRPM | SYSTOLIC BLOOD PRESSURE: 148 MMHG | BODY MASS INDEX: 29.66 KG/M2

## 2024-01-08 DIAGNOSIS — M25.551 PAIN OF RIGHT HIP: ICD-10-CM

## 2024-01-08 DIAGNOSIS — M54.40 BACK PAIN OF LUMBAR REGION WITH SCIATICA: Primary | ICD-10-CM

## 2024-01-08 PROCEDURE — 73502 X-RAY EXAM HIP UNI 2-3 VIEWS: CPT | Performed by: NURSE PRACTITIONER

## 2024-01-08 PROCEDURE — 99203 OFFICE O/P NEW LOW 30 MIN: CPT

## 2024-01-08 PROCEDURE — 99204 OFFICE O/P NEW MOD 45 MIN: CPT

## 2024-01-08 RX ORDER — TRAMADOL HYDROCHLORIDE 50 MG/1
TABLET ORAL EVERY 6 HOURS PRN
Qty: 15 TABLET | Refills: 0 | Status: SHIPPED | OUTPATIENT
Start: 2024-01-08 | End: 2024-01-13

## 2024-01-08 RX ORDER — DULOXETIN HYDROCHLORIDE 30 MG/1
30 CAPSULE, DELAYED RELEASE ORAL 2 TIMES DAILY
COMMUNITY
Start: 2023-02-27

## 2024-01-08 RX ORDER — DUPILUMAB 300 MG/2ML
300 INJECTION, SOLUTION SUBCUTANEOUS 2 TIMES DAILY
COMMUNITY
Start: 2022-09-30

## 2024-01-08 RX ORDER — MODAFINIL 200 MG/1
200 TABLET ORAL 2 TIMES DAILY
COMMUNITY
Start: 2023-11-01

## 2024-01-08 RX ORDER — CYCLOBENZAPRINE HCL 10 MG
10 TABLET ORAL DAILY PRN
COMMUNITY
Start: 2023-11-01

## 2024-01-08 RX ORDER — PREDNISONE 20 MG/1
40 TABLET ORAL DAILY
Qty: 10 TABLET | Refills: 0 | Status: SHIPPED | OUTPATIENT
Start: 2024-01-08 | End: 2024-01-13

## 2024-01-08 RX ORDER — LOSARTAN POTASSIUM 50 MG/1
50 TABLET ORAL DAILY
COMMUNITY
Start: 2023-03-28

## 2024-01-08 RX ORDER — CYCLOBENZAPRINE HCL 10 MG
10 TABLET ORAL 3 TIMES DAILY PRN
Qty: 20 TABLET | Refills: 0 | Status: SHIPPED | OUTPATIENT
Start: 2024-01-08 | End: 2024-01-15

## 2024-01-08 RX ORDER — HYDROCODONE BITARTRATE AND ACETAMINOPHEN 5; 325 MG/1; MG/1
2 TABLET ORAL ONCE
Status: COMPLETED | OUTPATIENT
Start: 2024-01-08 | End: 2024-01-08

## 2024-01-09 NOTE — DISCHARGE INSTRUCTIONS
Take medications as directed.  Follow-up closely with spine.  If any incontinence, foot drop, or weakness/numbness to her lower extremities go to the emergency room.

## 2024-01-09 NOTE — ED PROVIDER NOTES
Patient Seen in: [facility]      History     Chief Complaint   Patient presents with    Back Pain     Stated Complaint: lower back pain    Subjective:   HPI  58-year-old male with back pain, lymphoma in remission, DVT, fibromyalgia, and kidney stones presents complaining of lower back pain that radiates down his right leg.  He states his normal back pain radiates down his left leg.  His back pain started on Thursday and he went to his chiropractor/PT and was given trigger point injections without relief.  He states he is out of his muscle relaxers.  He denies any numbness or weakness.  He denies any incontinence.  He does not have a foot drop.    Objective:   Past Medical History:   Diagnosis Date    Back pain     Cancer (HCC)     DVT (deep venous thrombosis) (HCC)     Fibromyalgia     Herniated cervical disc     C5    Herniated lumbar intervertebral disc     History of blood clots     bilateral legs & r.arm    Kidney calculus     Leukemia (HCC)     Unspecified sleep apnea               Past Surgical History:   Procedure Laterality Date    REPAIR ING HERNIA,5+Y/O,REDUCIBL                  No pertinent social history.            Review of Systems   All other systems reviewed and are negative.      Positive for stated complaint: lower back pain  Other systems are as noted in HPI.  Constitutional and vital signs reviewed.      All other systems reviewed and negative except as noted above.    Physical Exam     ED Triage Vitals [01/08/24 1747]   /74   Pulse 78   Resp 20   Temp 97.9 °F (36.6 °C)   Temp src Temporal   SpO2 98 %   O2 Device None (Room air)       Current:/74   Pulse 78   Temp 97.9 °F (36.6 °C) (Temporal)   Resp 20   Ht 165.1 cm (5' 5\")   Wt 80.7 kg   SpO2 98%   BMI 29.62 kg/m²         Physical Exam  Vitals and nursing note reviewed.   Constitutional:       Appearance: He is well-developed.   Cardiovascular:      Rate and Rhythm: Normal rate and regular rhythm.      Heart  sounds: Normal heart sounds.   Pulmonary:      Effort: Pulmonary effort is normal.      Breath sounds: Normal breath sounds.   Musculoskeletal:      Comments: Bilateral lumbar paraspinal tenderness.  Pain over right sciatic notch.  Pain over the right hip.  Full range of motion.  No foot drop.   Skin:     General: Skin is warm and dry.   Neurological:      Mental Status: He is alert and oriented to person, place, and time.              ED Course   Labs Reviewed - No data to display          XR HIP W OR WO PELVIS 2 OR 3 VIEWS, RIGHT (CPT=73502)    Result Date: 1/8/2024  PROCEDURE:  XR HIP W OR WO PELVIS 2 OR 3 VIEWS, RIGHT ( CPT=73502)  TECHNIQUE:  Unilateral 2 to 3 views of the hip and pelvis if performed.  COMPARISON:  None.  INDICATIONS:  lower back pain  PATIENT STATED HISTORY: (As transcribed by Technologist)  lower back pain Pt has chronic lower back pain.  Having rt sided lower back pain for  last 5 days.  States pain radiates  to rt pelvic area down to rt thigh.  Denies numbness/tingling.    FINDINGS:  BONES:  There is mild joint space narrowing in right hip with marginal osteophyte formation.  Similar findings are noted in the left hip. SOFT TISSUES:  Negative.  No visible soft tissue swelling. EFFUSION:  None visible. OTHER:  Negative.            CONCLUSION:  There is osteoarthritis in both hips.  There is no acute abnormality.   LOCATION:     Dictated by (CST): Rodolfo Noyola MD on 1/08/2024 at 7:32 PM     Finalized by (CST): Rodolfo Noyola MD on 1/08/2024 at 7:33 PM               [facility]     Medical Decision Making  58-year-old male with back pain, lymphoma in remission, DVT, fibromyalgia, and kidney stones presents complaining of lower back pain that radiates down his right leg.  He states his normal back pain radiates down his left leg.  His back pain started on Thursday and he went to his chiropractor/PT and was given trigger point injections without relief.  He states he is out of his muscle relaxers.   He denies any numbness or weakness.  He denies any incontinence.  He does not have a foot drop.    Clinical impression: Back pain with sciatica, pain of right hip    Differential diagnosis: Sciatica, cauda equina, epidural abscess, right hip pain, hip fracture, hip dislocation    Hip x-ray was obtained that shows osteoarthritis.  Patient with no foot drop.  He has no weakness or numbness to his legs.  He has chronic back pain.  He will be given tramadol, prednisone, and a refill on his Flexeril.  I discussed that he needs to follow-up with spine.  ER precautions were provided.    Problems Addressed:  Back pain of lumbar region with sciatica: acute illness or injury  Pain of right hip: acute illness or injury    Amount and/or Complexity of Data Reviewed  Radiology: ordered and independent interpretation performed.     Details: Hip x-ray with no dislocation        Disposition and Plan     Clinical Impression:  1. Back pain of lumbar region with sciatica    2. Pain of right hip         Disposition:  Discharge  1/8/2024  7:37 pm    Follow-up:  Angelito Roy MD  [address] [place] [address]  [phone]    Call             Medications Prescribed:  Discharge Medication List as of 1/8/2024  7:40 PM        START taking these medications    Details   predniSONE 20 MG Oral Tab Take 2 tablets (40 mg total) by mouth daily for 5 days., Normal, Disp-10 tablet, R-0      !! traMADol 50 MG Oral Tab Take 1-2 tablets ( mg total) by mouth every 6 (six) hours as needed for Pain., Normal, Disp-15 tablet, R-0       !! - Potential duplicate medications found. Please discuss with provider.

## 2024-01-09 NOTE — ED INITIAL ASSESSMENT (HPI)
Pt has chronic lower back pain.     Having rt sided lower back pain for last 5 days.    Pt taking flexeril, last dose was this am (states med did not help).      States pain radiates  to rt pelvic area down to rt thigh.    Denies numbness/tingling.

## 2024-02-06 ENCOUNTER — APPOINTMENT (OUTPATIENT)
Dept: NEUROLOGY | Age: 59
End: 2024-02-06

## 2024-02-06 VITALS
HEIGHT: 66 IN | WEIGHT: 184.08 LBS | BODY MASS INDEX: 29.58 KG/M2 | OXYGEN SATURATION: 98 % | DIASTOLIC BLOOD PRESSURE: 89 MMHG | HEART RATE: 80 BPM | SYSTOLIC BLOOD PRESSURE: 130 MMHG

## 2024-02-06 DIAGNOSIS — G44.86 CERVICOGENIC HEADACHE: ICD-10-CM

## 2024-02-06 DIAGNOSIS — G47.33 OBSTRUCTIVE SLEEP APNEA: ICD-10-CM

## 2024-02-06 DIAGNOSIS — G43.009 MIGRAINE WITHOUT AURA AND WITHOUT STATUS MIGRAINOSUS, NOT INTRACTABLE: Primary | ICD-10-CM

## 2024-02-06 DIAGNOSIS — M79.7 FIBROMYALGIA: ICD-10-CM

## 2024-02-06 DIAGNOSIS — G25.81 RESTLESS LEGS SYNDROME: ICD-10-CM

## 2024-02-06 DIAGNOSIS — R41.3 MEMORY LOSS: ICD-10-CM

## 2024-02-06 RX ORDER — RIZATRIPTAN BENZOATE 10 MG/1
TABLET ORAL
Qty: 12 TABLET | Refills: 3 | Status: SHIPPED | OUTPATIENT
Start: 2024-02-06

## 2024-02-06 ASSESSMENT — ENCOUNTER SYMPTOMS
RESPIRATORY NEGATIVE: 1
CONSTITUTIONAL NEGATIVE: 1
GASTROINTESTINAL NEGATIVE: 1
EYES NEGATIVE: 1
NEUROLOGICAL NEGATIVE: 1
ENDOCRINE NEGATIVE: 1
HEMATOLOGIC/LYMPHATIC NEGATIVE: 1
ALLERGIC/IMMUNOLOGIC NEGATIVE: 1
PSYCHIATRIC NEGATIVE: 1

## 2024-02-09 ENCOUNTER — OFFICE VISIT (OUTPATIENT)
Dept: RHEUMATOLOGY | Age: 59
End: 2024-02-09

## 2024-02-09 VITALS
HEART RATE: 81 BPM | OXYGEN SATURATION: 96 % | DIASTOLIC BLOOD PRESSURE: 101 MMHG | BODY MASS INDEX: 29.58 KG/M2 | SYSTOLIC BLOOD PRESSURE: 162 MMHG | WEIGHT: 184.08 LBS | HEIGHT: 66 IN

## 2024-02-09 DIAGNOSIS — Z79.899 ANALGESIC USE: ICD-10-CM

## 2024-02-09 DIAGNOSIS — M75.22 BICEPS TENDINITIS OF LEFT UPPER EXTREMITY: ICD-10-CM

## 2024-02-09 DIAGNOSIS — M50.30 DDD (DEGENERATIVE DISC DISEASE), CERVICAL: Primary | ICD-10-CM

## 2024-02-09 DIAGNOSIS — Z79.891 OPIOID USE AGREEMENT EXISTS: ICD-10-CM

## 2024-02-09 DIAGNOSIS — M26.609 TMJ (TEMPOROMANDIBULAR JOINT DISORDER): ICD-10-CM

## 2024-02-09 DIAGNOSIS — M17.0 PRIMARY OSTEOARTHRITIS OF BOTH KNEES: ICD-10-CM

## 2024-02-09 RX ORDER — TRAMADOL HYDROCHLORIDE 50 MG/1
50 TABLET ORAL 2 TIMES DAILY
Qty: 60 TABLET | Refills: 0 | Status: SHIPPED | OUTPATIENT
Start: 2024-02-09

## 2024-02-09 ASSESSMENT — PAIN SCALES - GENERAL: PAINLEVEL: 7

## 2024-02-14 ENCOUNTER — APPOINTMENT (OUTPATIENT)
Dept: INTERNAL MEDICINE | Age: 59
End: 2024-02-14

## 2024-02-15 ENCOUNTER — APPOINTMENT (OUTPATIENT)
Dept: INTERNAL MEDICINE | Age: 59
End: 2024-02-15

## 2024-02-27 ENCOUNTER — IMAGING SERVICES (OUTPATIENT)
Dept: GENERAL RADIOLOGY | Age: 59
End: 2024-02-27
Attending: ORTHOPAEDIC SURGERY

## 2024-02-27 ENCOUNTER — APPOINTMENT (OUTPATIENT)
Dept: ORTHOPEDICS | Age: 59
End: 2024-02-27
Attending: INTERNAL MEDICINE

## 2024-02-27 VITALS — BODY MASS INDEX: 29.09 KG/M2 | WEIGHT: 181 LBS | HEIGHT: 66 IN

## 2024-02-27 DIAGNOSIS — M75.22 BICEPS TENDONITIS ON LEFT: ICD-10-CM

## 2024-02-27 DIAGNOSIS — M25.522 LEFT ELBOW PAIN: Primary | ICD-10-CM

## 2024-02-27 PROCEDURE — 73080 X-RAY EXAM OF ELBOW: CPT | Performed by: ORTHOPAEDIC SURGERY

## 2024-02-27 PROCEDURE — 99204 OFFICE O/P NEW MOD 45 MIN: CPT | Performed by: ORTHOPAEDIC SURGERY

## 2024-03-10 ENCOUNTER — APPOINTMENT (OUTPATIENT)
Dept: MRI IMAGING | Age: 59
End: 2024-03-10
Attending: ORTHOPAEDIC SURGERY

## 2024-03-21 ENCOUNTER — APPOINTMENT (OUTPATIENT)
Dept: NEUROLOGY | Age: 59
End: 2024-03-21

## 2024-04-01 ENCOUNTER — OFFICE VISIT (OUTPATIENT)
Dept: SLEEP MEDICINE | Age: 59
End: 2024-04-01

## 2024-04-01 VITALS
OXYGEN SATURATION: 100 % | HEIGHT: 66 IN | TEMPERATURE: 98 F | SYSTOLIC BLOOD PRESSURE: 164 MMHG | HEART RATE: 70 BPM | WEIGHT: 183 LBS | RESPIRATION RATE: 16 BRPM | BODY MASS INDEX: 29.41 KG/M2 | DIASTOLIC BLOOD PRESSURE: 84 MMHG

## 2024-04-01 DIAGNOSIS — G47.19 EXCESSIVE DAYTIME SLEEPINESS: ICD-10-CM

## 2024-04-01 DIAGNOSIS — G47.33 OBSTRUCTIVE SLEEP APNEA: Primary | ICD-10-CM

## 2024-04-01 DIAGNOSIS — G25.81 RESTLESS LEGS SYNDROME: ICD-10-CM

## 2024-04-01 PROCEDURE — 99214 OFFICE O/P EST MOD 30 MIN: CPT | Performed by: FAMILY MEDICINE

## 2024-04-01 PROCEDURE — 3077F SYST BP >= 140 MM HG: CPT | Performed by: FAMILY MEDICINE

## 2024-04-01 PROCEDURE — 3079F DIAST BP 80-89 MM HG: CPT | Performed by: FAMILY MEDICINE

## 2024-04-01 PROCEDURE — 99213 OFFICE O/P EST LOW 20 MIN: CPT | Performed by: FAMILY MEDICINE

## 2024-04-02 ENCOUNTER — TELEPHONE (OUTPATIENT)
Dept: SLEEP MEDICINE | Age: 59
End: 2024-04-02

## 2024-04-16 DIAGNOSIS — M50.30 DDD (DEGENERATIVE DISC DISEASE), CERVICAL: ICD-10-CM

## 2024-04-16 DIAGNOSIS — M17.0 PRIMARY OSTEOARTHRITIS OF BOTH KNEES: ICD-10-CM

## 2024-04-16 RX ORDER — TRAMADOL HYDROCHLORIDE 50 MG/1
50 TABLET ORAL 2 TIMES DAILY
Qty: 60 TABLET | Refills: 0 | Status: CANCELLED | OUTPATIENT
Start: 2024-04-16

## 2024-04-18 DIAGNOSIS — M50.30 DDD (DEGENERATIVE DISC DISEASE), CERVICAL: ICD-10-CM

## 2024-04-18 DIAGNOSIS — M17.0 PRIMARY OSTEOARTHRITIS OF BOTH KNEES: ICD-10-CM

## 2024-04-19 RX ORDER — TRAMADOL HYDROCHLORIDE 50 MG/1
50 TABLET ORAL 2 TIMES DAILY
Qty: 60 TABLET | Refills: 0 | Status: SHIPPED | OUTPATIENT
Start: 2024-04-19

## 2024-05-05 DIAGNOSIS — G47.33 OBSTRUCTIVE SLEEP APNEA: ICD-10-CM

## 2024-05-06 DIAGNOSIS — G47.33 OBSTRUCTIVE SLEEP APNEA: ICD-10-CM

## 2024-05-06 RX ORDER — MODAFINIL 200 MG/1
200 TABLET ORAL 2 TIMES DAILY
Qty: 60 TABLET | Refills: 3 | OUTPATIENT
Start: 2024-05-06

## 2024-05-06 RX ORDER — MODAFINIL 200 MG/1
200 TABLET ORAL 2 TIMES DAILY
Qty: 60 TABLET | Refills: 1 | Status: SHIPPED | OUTPATIENT
Start: 2024-05-06 | End: 2024-07-05

## 2024-05-09 LAB
ALBUMIN SERPL-MCNC: 4.1 G/DL (ref 3.4–5)
ALBUMIN/GLOB SERPL: 1.1 {RATIO} (ref 1–2)
ALP LIVER SERPL-CCNC: 112 U/L
ALT SERPL-CCNC: 30 U/L
ANION GAP SERPL CALC-SCNC: 4 MMOL/L (ref 0–18)
AST SERPL-CCNC: 31 U/L (ref 15–37)
BASOPHILS # BLD AUTO: 0.05 X10(3) UL (ref 0–0.2)
BASOPHILS NFR BLD AUTO: 0.7 %
BILIRUB SERPL-MCNC: 1.3 MG/DL (ref 0.1–2)
BILIRUB UR QL STRIP.AUTO: NEGATIVE
BUN BLD-MCNC: 13 MG/DL (ref 9–23)
CALCIUM BLD-MCNC: 9.2 MG/DL (ref 8.5–10.1)
CHLORIDE SERPL-SCNC: 107 MMOL/L (ref 98–112)
CO2 SERPL-SCNC: 30 MMOL/L (ref 21–32)
CREAT BLD-MCNC: 0.96 MG/DL
EGFRCR SERPLBLD CKD-EPI 2021: 91 ML/MIN/1.73M2 (ref 60–?)
EOSINOPHIL # BLD AUTO: 0.28 X10(3) UL (ref 0–0.7)
EOSINOPHIL NFR BLD AUTO: 3.9 %
ERYTHROCYTE [DISTWIDTH] IN BLOOD BY AUTOMATED COUNT: 12.7 %
GLOBULIN PLAS-MCNC: 3.6 G/DL (ref 2.8–4.4)
GLUCOSE BLD-MCNC: 131 MG/DL (ref 70–99)
GLUCOSE UR STRIP.AUTO-MCNC: NORMAL MG/DL
HCT VFR BLD AUTO: 45.6 %
HGB BLD-MCNC: 15.5 G/DL
IMM GRANULOCYTES # BLD AUTO: 0.01 X10(3) UL (ref 0–1)
IMM GRANULOCYTES NFR BLD: 0.1 %
KETONES UR STRIP.AUTO-MCNC: NEGATIVE MG/DL
LEUKOCYTE ESTERASE UR QL STRIP.AUTO: NEGATIVE
LIPASE SERPL-CCNC: 42 U/L (ref 13–75)
LYMPHOCYTES # BLD AUTO: 2.5 X10(3) UL (ref 1–4)
LYMPHOCYTES NFR BLD AUTO: 35.1 %
MCH RBC QN AUTO: 29.3 PG (ref 26–34)
MCHC RBC AUTO-ENTMCNC: 34 G/DL (ref 31–37)
MCV RBC AUTO: 86.2 FL
MONOCYTES # BLD AUTO: 0.5 X10(3) UL (ref 0.1–1)
MONOCYTES NFR BLD AUTO: 7 %
NEUTROPHILS # BLD AUTO: 3.78 X10 (3) UL (ref 1.5–7.7)
NEUTROPHILS # BLD AUTO: 3.78 X10(3) UL (ref 1.5–7.7)
NEUTROPHILS NFR BLD AUTO: 53.2 %
NITRITE UR QL STRIP.AUTO: NEGATIVE
OSMOLALITY SERPL CALC.SUM OF ELEC: 294 MOSM/KG (ref 275–295)
PH UR STRIP.AUTO: 5.5 [PH] (ref 5–8)
PLATELET # BLD AUTO: 156 10(3)UL (ref 150–450)
POTASSIUM SERPL-SCNC: 4 MMOL/L (ref 3.5–5.1)
PROT SERPL-MCNC: 7.7 G/DL (ref 6.4–8.2)
PROT UR STRIP.AUTO-MCNC: 30 MG/DL
RBC # BLD AUTO: 5.29 X10(6)UL
RBC #/AREA URNS AUTO: >10 /HPF
SODIUM SERPL-SCNC: 141 MMOL/L (ref 136–145)
SP GR UR STRIP.AUTO: 1.02 (ref 1–1.03)
UROBILINOGEN UR STRIP.AUTO-MCNC: NORMAL MG/DL
WBC # BLD AUTO: 7.1 X10(3) UL (ref 4–11)

## 2024-05-10 ENCOUNTER — APPOINTMENT (OUTPATIENT)
Dept: GENERAL RADIOLOGY | Facility: HOSPITAL | Age: 59
End: 2024-05-10
Attending: UROLOGY

## 2024-05-10 ENCOUNTER — HOSPITAL ENCOUNTER (OUTPATIENT)
Facility: HOSPITAL | Age: 59
Setting detail: OBSERVATION
Discharge: HOME | End: 2024-05-10
Attending: EMERGENCY MEDICINE | Admitting: HOSPITALIST

## 2024-05-10 ENCOUNTER — ANESTHESIA (OUTPATIENT)
Dept: SURGERY | Facility: HOSPITAL | Age: 59
End: 2024-05-10
Payer: COMMERCIAL

## 2024-05-10 ENCOUNTER — APPOINTMENT (OUTPATIENT)
Dept: CT IMAGING | Facility: HOSPITAL | Age: 59
End: 2024-05-10
Attending: EMERGENCY MEDICINE

## 2024-05-10 ENCOUNTER — ANESTHESIA EVENT (OUTPATIENT)
Dept: SURGERY | Facility: HOSPITAL | Age: 59
End: 2024-05-10
Payer: COMMERCIAL

## 2024-05-10 VITALS
SYSTOLIC BLOOD PRESSURE: 131 MMHG | HEIGHT: 66 IN | HEART RATE: 68 BPM | BODY MASS INDEX: 28.45 KG/M2 | RESPIRATION RATE: 16 BRPM | OXYGEN SATURATION: 96 % | DIASTOLIC BLOOD PRESSURE: 78 MMHG | TEMPERATURE: 98 F | WEIGHT: 177 LBS

## 2024-05-10 DIAGNOSIS — N20.1 URETERAL CALCULI: ICD-10-CM

## 2024-05-10 DIAGNOSIS — N20.0 KIDNEY STONE: Primary | ICD-10-CM

## 2024-05-10 DIAGNOSIS — R52 INTRACTABLE PAIN: ICD-10-CM

## 2024-05-10 PROCEDURE — 74176 CT ABD & PELVIS W/O CONTRAST: CPT | Performed by: EMERGENCY MEDICINE

## 2024-05-10 PROCEDURE — 0TC68ZZ EXTIRPATION OF MATTER FROM RIGHT URETER, VIA NATURAL OR ARTIFICIAL OPENING ENDOSCOPIC: ICD-10-PCS | Performed by: UROLOGY

## 2024-05-10 PROCEDURE — BT1D1ZZ FLUOROSCOPY OF RIGHT KIDNEY, URETER AND BLADDER USING LOW OSMOLAR CONTRAST: ICD-10-PCS | Performed by: UROLOGY

## 2024-05-10 PROCEDURE — 99223 1ST HOSP IP/OBS HIGH 75: CPT | Performed by: HOSPITALIST

## 2024-05-10 RX ORDER — PROCHLORPERAZINE EDISYLATE 5 MG/ML
5 INJECTION INTRAMUSCULAR; INTRAVENOUS EVERY 8 HOURS PRN
Status: DISCONTINUED | OUTPATIENT
Start: 2024-05-10 | End: 2024-05-10

## 2024-05-10 RX ORDER — HYDROMORPHONE HYDROCHLORIDE 1 MG/ML
0.8 INJECTION, SOLUTION INTRAMUSCULAR; INTRAVENOUS; SUBCUTANEOUS EVERY 2 HOUR PRN
Status: DISCONTINUED | OUTPATIENT
Start: 2024-05-10 | End: 2024-05-10

## 2024-05-10 RX ORDER — HYDROMORPHONE HYDROCHLORIDE 1 MG/ML
0.2 INJECTION, SOLUTION INTRAMUSCULAR; INTRAVENOUS; SUBCUTANEOUS EVERY 5 MIN PRN
Status: DISCONTINUED | OUTPATIENT
Start: 2024-05-10 | End: 2024-05-10 | Stop reason: HOSPADM

## 2024-05-10 RX ORDER — HYDROCODONE BITARTRATE AND ACETAMINOPHEN 5; 325 MG/1; MG/1
1 TABLET ORAL EVERY 6 HOURS PRN
Qty: 8 TABLET | Refills: 0 | Status: SHIPPED | OUTPATIENT
Start: 2024-05-10 | End: 2024-05-12

## 2024-05-10 RX ORDER — ONDANSETRON 2 MG/ML
INJECTION INTRAMUSCULAR; INTRAVENOUS AS NEEDED
Status: DISCONTINUED | OUTPATIENT
Start: 2024-05-10 | End: 2024-05-10 | Stop reason: SURG

## 2024-05-10 RX ORDER — HYDROMORPHONE HYDROCHLORIDE 1 MG/ML
0.6 INJECTION, SOLUTION INTRAMUSCULAR; INTRAVENOUS; SUBCUTANEOUS EVERY 5 MIN PRN
Status: DISCONTINUED | OUTPATIENT
Start: 2024-05-10 | End: 2024-05-10 | Stop reason: HOSPADM

## 2024-05-10 RX ORDER — SODIUM CHLORIDE, SODIUM LACTATE, POTASSIUM CHLORIDE, CALCIUM CHLORIDE 600; 310; 30; 20 MG/100ML; MG/100ML; MG/100ML; MG/100ML
INJECTION, SOLUTION INTRAVENOUS CONTINUOUS
Status: DISCONTINUED | OUTPATIENT
Start: 2024-05-10 | End: 2024-05-10 | Stop reason: HOSPADM

## 2024-05-10 RX ORDER — CEFAZOLIN SODIUM 1 G/3ML
INJECTION, POWDER, FOR SOLUTION INTRAMUSCULAR; INTRAVENOUS AS NEEDED
Status: DISCONTINUED | OUTPATIENT
Start: 2024-05-10 | End: 2024-05-10 | Stop reason: SURG

## 2024-05-10 RX ORDER — ACETAMINOPHEN 500 MG
1000 TABLET ORAL ONCE AS NEEDED
Status: DISCONTINUED | OUTPATIENT
Start: 2024-05-10 | End: 2024-05-10 | Stop reason: HOSPADM

## 2024-05-10 RX ORDER — PHENAZOPYRIDINE HYDROCHLORIDE 200 MG/1
200 TABLET, FILM COATED ORAL 3 TIMES DAILY PRN
Qty: 9 TABLET | Refills: 0 | Status: SHIPPED | OUTPATIENT
Start: 2024-05-10

## 2024-05-10 RX ORDER — KETOROLAC TROMETHAMINE 15 MG/ML
15 INJECTION, SOLUTION INTRAMUSCULAR; INTRAVENOUS ONCE
Status: COMPLETED | OUTPATIENT
Start: 2024-05-10 | End: 2024-05-10

## 2024-05-10 RX ORDER — LIDOCAINE HYDROCHLORIDE 20 MG/ML
JELLY TOPICAL AS NEEDED
Status: DISCONTINUED | OUTPATIENT
Start: 2024-05-10 | End: 2024-05-10 | Stop reason: HOSPADM

## 2024-05-10 RX ORDER — KETOROLAC TROMETHAMINE 15 MG/ML
INJECTION, SOLUTION INTRAMUSCULAR; INTRAVENOUS
Status: COMPLETED
Start: 2024-05-10 | End: 2024-05-10

## 2024-05-10 RX ORDER — PHENAZOPYRIDINE HYDROCHLORIDE 200 MG/1
200 TABLET, FILM COATED ORAL 3 TIMES DAILY PRN
Status: DISCONTINUED | OUTPATIENT
Start: 2024-05-10 | End: 2024-05-10

## 2024-05-10 RX ORDER — HYDROMORPHONE HYDROCHLORIDE 1 MG/ML
0.4 INJECTION, SOLUTION INTRAMUSCULAR; INTRAVENOUS; SUBCUTANEOUS EVERY 5 MIN PRN
Status: DISCONTINUED | OUTPATIENT
Start: 2024-05-10 | End: 2024-05-10 | Stop reason: HOSPADM

## 2024-05-10 RX ORDER — BISACODYL 10 MG
10 SUPPOSITORY, RECTAL RECTAL
Status: DISCONTINUED | OUTPATIENT
Start: 2024-05-10 | End: 2024-05-10

## 2024-05-10 RX ORDER — HYDROMORPHONE HYDROCHLORIDE 1 MG/ML
0.4 INJECTION, SOLUTION INTRAMUSCULAR; INTRAVENOUS; SUBCUTANEOUS EVERY 2 HOUR PRN
Status: DISCONTINUED | OUTPATIENT
Start: 2024-05-10 | End: 2024-05-10

## 2024-05-10 RX ORDER — ENEMA 19; 7 G/133ML; G/133ML
1 ENEMA RECTAL ONCE AS NEEDED
Status: DISCONTINUED | OUTPATIENT
Start: 2024-05-10 | End: 2024-05-10

## 2024-05-10 RX ORDER — ONDANSETRON 2 MG/ML
4 INJECTION INTRAMUSCULAR; INTRAVENOUS EVERY 6 HOURS PRN
Status: DISCONTINUED | OUTPATIENT
Start: 2024-05-10 | End: 2024-05-10

## 2024-05-10 RX ORDER — TAMSULOSIN HYDROCHLORIDE 0.4 MG/1
0.4 CAPSULE ORAL DAILY
Status: DISCONTINUED | OUTPATIENT
Start: 2024-05-10 | End: 2024-05-10

## 2024-05-10 RX ORDER — IBUPROFEN 600 MG/1
600 TABLET ORAL EVERY 6 HOURS PRN
Status: DISCONTINUED | OUTPATIENT
Start: 2024-05-10 | End: 2024-05-10

## 2024-05-10 RX ORDER — ACETAMINOPHEN 500 MG
500 TABLET ORAL EVERY 4 HOURS PRN
Status: DISCONTINUED | OUTPATIENT
Start: 2024-05-10 | End: 2024-05-10

## 2024-05-10 RX ORDER — HYDROCODONE BITARTRATE AND ACETAMINOPHEN 5; 325 MG/1; MG/1
1 TABLET ORAL ONCE AS NEEDED
Status: DISCONTINUED | OUTPATIENT
Start: 2024-05-10 | End: 2024-05-10 | Stop reason: HOSPADM

## 2024-05-10 RX ORDER — HYDROMORPHONE HYDROCHLORIDE 1 MG/ML
0.2 INJECTION, SOLUTION INTRAMUSCULAR; INTRAVENOUS; SUBCUTANEOUS EVERY 2 HOUR PRN
Status: DISCONTINUED | OUTPATIENT
Start: 2024-05-10 | End: 2024-05-10

## 2024-05-10 RX ORDER — NALOXONE HYDROCHLORIDE 0.4 MG/ML
80 INJECTION, SOLUTION INTRAMUSCULAR; INTRAVENOUS; SUBCUTANEOUS AS NEEDED
Status: DISCONTINUED | OUTPATIENT
Start: 2024-05-10 | End: 2024-05-10 | Stop reason: HOSPADM

## 2024-05-10 RX ORDER — LOSARTAN POTASSIUM 25 MG/1
50 TABLET ORAL DAILY
Status: DISCONTINUED | OUTPATIENT
Start: 2024-05-10 | End: 2024-05-10

## 2024-05-10 RX ORDER — METOCLOPRAMIDE HYDROCHLORIDE 5 MG/ML
INJECTION INTRAMUSCULAR; INTRAVENOUS AS NEEDED
Status: DISCONTINUED | OUTPATIENT
Start: 2024-05-10 | End: 2024-05-10 | Stop reason: SURG

## 2024-05-10 RX ORDER — MIDAZOLAM HYDROCHLORIDE 1 MG/ML
INJECTION INTRAMUSCULAR; INTRAVENOUS AS NEEDED
Status: DISCONTINUED | OUTPATIENT
Start: 2024-05-10 | End: 2024-05-10 | Stop reason: SURG

## 2024-05-10 RX ORDER — HYDROCODONE BITARTRATE AND ACETAMINOPHEN 5; 325 MG/1; MG/1
2 TABLET ORAL ONCE AS NEEDED
Status: DISCONTINUED | OUTPATIENT
Start: 2024-05-10 | End: 2024-05-10 | Stop reason: HOSPADM

## 2024-05-10 RX ORDER — ONDANSETRON 2 MG/ML
4 INJECTION INTRAMUSCULAR; INTRAVENOUS EVERY 6 HOURS PRN
Status: DISCONTINUED | OUTPATIENT
Start: 2024-05-10 | End: 2024-05-10 | Stop reason: HOSPADM

## 2024-05-10 RX ORDER — LIDOCAINE HYDROCHLORIDE 10 MG/ML
INJECTION, SOLUTION EPIDURAL; INFILTRATION; INTRACAUDAL; PERINEURAL AS NEEDED
Status: DISCONTINUED | OUTPATIENT
Start: 2024-05-10 | End: 2024-05-10 | Stop reason: SURG

## 2024-05-10 RX ORDER — LABETALOL HYDROCHLORIDE 5 MG/ML
5 INJECTION, SOLUTION INTRAVENOUS EVERY 5 MIN PRN
Status: DISCONTINUED | OUTPATIENT
Start: 2024-05-10 | End: 2024-05-10 | Stop reason: HOSPADM

## 2024-05-10 RX ORDER — SENNOSIDES 8.6 MG
17.2 TABLET ORAL NIGHTLY PRN
Status: DISCONTINUED | OUTPATIENT
Start: 2024-05-10 | End: 2024-05-10

## 2024-05-10 RX ORDER — ALBUTEROL SULFATE 2.5 MG/3ML
2.5 SOLUTION RESPIRATORY (INHALATION) AS NEEDED
Status: DISCONTINUED | OUTPATIENT
Start: 2024-05-10 | End: 2024-05-10 | Stop reason: HOSPADM

## 2024-05-10 RX ORDER — ALBUTEROL SULFATE 90 UG/1
2 AEROSOL, METERED RESPIRATORY (INHALATION) EVERY 4 HOURS PRN
COMMUNITY
Start: 2024-03-04

## 2024-05-10 RX ORDER — MEPERIDINE HYDROCHLORIDE 25 MG/ML
12.5 INJECTION INTRAMUSCULAR; INTRAVENOUS; SUBCUTANEOUS AS NEEDED
Status: DISCONTINUED | OUTPATIENT
Start: 2024-05-10 | End: 2024-05-10 | Stop reason: HOSPADM

## 2024-05-10 RX ORDER — HYDROMORPHONE HYDROCHLORIDE 1 MG/ML
1 INJECTION, SOLUTION INTRAMUSCULAR; INTRAVENOUS; SUBCUTANEOUS ONCE
Status: COMPLETED | OUTPATIENT
Start: 2024-05-10 | End: 2024-05-10

## 2024-05-10 RX ORDER — SODIUM CHLORIDE 9 MG/ML
INJECTION, SOLUTION INTRAVENOUS CONTINUOUS
Status: DISCONTINUED | OUTPATIENT
Start: 2024-05-10 | End: 2024-05-10

## 2024-05-10 RX ORDER — POLYETHYLENE GLYCOL 3350 17 G/17G
17 POWDER, FOR SOLUTION ORAL DAILY PRN
Status: DISCONTINUED | OUTPATIENT
Start: 2024-05-10 | End: 2024-05-10

## 2024-05-10 RX ORDER — MIDAZOLAM HYDROCHLORIDE 1 MG/ML
1 INJECTION INTRAMUSCULAR; INTRAVENOUS EVERY 5 MIN PRN
Status: DISCONTINUED | OUTPATIENT
Start: 2024-05-10 | End: 2024-05-10 | Stop reason: HOSPADM

## 2024-05-10 RX ORDER — PROCHLORPERAZINE EDISYLATE 5 MG/ML
5 INJECTION INTRAMUSCULAR; INTRAVENOUS EVERY 8 HOURS PRN
Status: DISCONTINUED | OUTPATIENT
Start: 2024-05-10 | End: 2024-05-10 | Stop reason: HOSPADM

## 2024-05-10 RX ORDER — DEXAMETHASONE SODIUM PHOSPHATE 4 MG/ML
VIAL (ML) INJECTION AS NEEDED
Status: DISCONTINUED | OUTPATIENT
Start: 2024-05-10 | End: 2024-05-10 | Stop reason: SURG

## 2024-05-10 RX ORDER — KETOROLAC TROMETHAMINE 30 MG/ML
INJECTION, SOLUTION INTRAMUSCULAR; INTRAVENOUS AS NEEDED
Status: DISCONTINUED | OUTPATIENT
Start: 2024-05-10 | End: 2024-05-10 | Stop reason: SURG

## 2024-05-10 RX ORDER — SODIUM CHLORIDE, SODIUM LACTATE, POTASSIUM CHLORIDE, CALCIUM CHLORIDE 600; 310; 30; 20 MG/100ML; MG/100ML; MG/100ML; MG/100ML
INJECTION, SOLUTION INTRAVENOUS CONTINUOUS PRN
Status: DISCONTINUED | OUTPATIENT
Start: 2024-05-10 | End: 2024-05-10 | Stop reason: SURG

## 2024-05-10 RX ADMIN — LIDOCAINE HYDROCHLORIDE 50 MG: 10 INJECTION, SOLUTION EPIDURAL; INFILTRATION; INTRACAUDAL; PERINEURAL at 19:05:00

## 2024-05-10 RX ADMIN — ONDANSETRON 4 MG: 2 INJECTION INTRAMUSCULAR; INTRAVENOUS at 19:25:00

## 2024-05-10 RX ADMIN — METOCLOPRAMIDE HYDROCHLORIDE 10 MG: 5 INJECTION INTRAMUSCULAR; INTRAVENOUS at 19:10:00

## 2024-05-10 RX ADMIN — SODIUM CHLORIDE, SODIUM LACTATE, POTASSIUM CHLORIDE, CALCIUM CHLORIDE: 600; 310; 30; 20 INJECTION, SOLUTION INTRAVENOUS at 18:55:00

## 2024-05-10 RX ADMIN — KETOROLAC TROMETHAMINE 30 MG: 30 INJECTION, SOLUTION INTRAMUSCULAR; INTRAVENOUS at 19:25:00

## 2024-05-10 RX ADMIN — CEFAZOLIN SODIUM 2 G: 1 INJECTION, POWDER, FOR SOLUTION INTRAMUSCULAR; INTRAVENOUS at 19:10:00

## 2024-05-10 RX ADMIN — MIDAZOLAM HYDROCHLORIDE 1 MG: 1 INJECTION INTRAMUSCULAR; INTRAVENOUS at 18:55:00

## 2024-05-10 RX ADMIN — MIDAZOLAM HYDROCHLORIDE 1 MG: 1 INJECTION INTRAMUSCULAR; INTRAVENOUS at 19:00:00

## 2024-05-10 RX ADMIN — DEXAMETHASONE SODIUM PHOSPHATE 4 MG: 4 MG/ML VIAL (ML) INJECTION at 19:10:00

## 2024-05-10 NOTE — CONSULTS
[facility]  Report of Consultation    Alonzo Quevedo Patient Status:  Observation    3/21/1965 MRN [id number]   Location [facility] PRE OP HOLDING Attending Scott Nuñez MD   Hosp Day # 0 PCP Bruce Bernheim, MD     Reason for Consultation:  Right ureter stone, intractable goldie    History of Present Illness:  Alonzo Quevedo is a a(n) 59 year old male. Who presented to ER with right renal colic. Ongoing for the last one week.  Urine is darker, has added frequency, suspects some hematuria. Admitted for intractable pain and nausea. No fever. Has had stones before.  Has had ESWL and URS.  Had a last URS in 2019 here with Dr. Feng Landa for the left side.  Pain currently is on the right side.  Desires surgery.  Has a hx of lymphoma, SHOBHA, follows with a team up at [place].   Had chemo for it in the past. Is now just followed, has a routine FU in a couple of weeks.    History:  Past Medical History:    Anxiety state    Asthma (HCC)    Back pain    Back problem    Cancer (HCC)    Depression    DVT (deep venous thrombosis) (HCC)    Fibromyalgia    Follicular lymphoma grade I (HCC)    Hearing impairment    Herniated cervical disc    C5    Herniated lumbar intervertebral disc    High blood pressure    History of blood clots    bilateral legs & r.arm    Kidney calculus    Leukemia (HCC)    Restless leg syndrome    Unspecified sleep apnea    Visual impairment     Past Surgical History:   Procedure Laterality Date    Cystoscopy,fragmt ureteral stone Left     URS, laser, stone removal, Dr. Landa    Lithotripsy      ESWL and URS in the past    Repair ing hernia,5+y/o,reducibl       No family history on file.   reports that he has never smoked. He has never used smokeless tobacco. He reports that he does not drink alcohol.    Allergies:  Allergies   Allergen Reactions    Cat Dander [Dander]     Nickel RASH       Medications:    Current Facility-Administered Medications:     sodium  chloride 0.9% infusion, , Intravenous, Continuous    [Transfer Hold] acetaminophen (Tylenol Extra Strength) tab 500 mg, 500 mg, Oral, Q4H PRN    [Transfer Hold] HYDROmorphone (Dilaudid) 1 MG/ML injection 0.2 mg, 0.2 mg, Intravenous, Q2H PRN **OR** [Transfer Hold] HYDROmorphone (Dilaudid) 1 MG/ML injection 0.4 mg, 0.4 mg, Intravenous, Q2H PRN **OR** [Transfer Hold] HYDROmorphone (Dilaudid) 1 MG/ML injection 0.8 mg, 0.8 mg, Intravenous, Q2H PRN    [Transfer Hold] ondansetron (Zofran) 4 MG/2ML injection 4 mg, 4 mg, Intravenous, Q6H PRN    [Transfer Hold] prochlorperazine (Compazine) 10 MG/2ML injection 5 mg, 5 mg, Intravenous, Q8H PRN    [Transfer Hold] polyethylene glycol (PEG 3350) (Miralax) 17 g oral packet 17 g, 17 g, Oral, Daily PRN    [Transfer Hold] sennosides (Senokot) tab 17.2 mg, 17.2 mg, Oral, Nightly PRN    [Transfer Hold] bisacodyl (Dulcolax) 10 MG rectal suppository 10 mg, 10 mg, Rectal, Daily PRN    [Transfer Hold] fleet enema (Fleet) 7-19 GM/118ML rectal enema 133 mL, 1 enema, Rectal, Once PRN    [Transfer Hold] losartan (Cozaar) tab 50 mg, 50 mg, Oral, Daily    [Transfer Hold] tamsulosin (Flomax) cap 0.4 mg, 0.4 mg, Oral, Daily    Review of Systems:  Pertinent items are noted in HPI.    Physical Exam:  /79 (BP Location: Right arm)   Pulse 68   Temp 98.6 °F (37 °C) (Oral)   Resp 18   Ht 5' 6\" (1.676 m)   Wt 177 lb (80.3 kg)   SpO2 100%   BMI 28.57 kg/m²   A+Ox3  Here with spouse  LUNGS no labored  COR reg  ABD currently nontender    Laboratory Data:  Lab Results   Component Value Date    WBC 7.1 05/09/2024    HGB 15.5 05/09/2024    HCT 45.6 05/09/2024    .0 05/09/2024    CREATSERUM 0.96 05/09/2024    BUN 13 05/09/2024     05/09/2024    K 4.0 05/09/2024     05/09/2024    CO2 30.0 05/09/2024     05/09/2024    CA 9.2 05/09/2024     Kidney:    Lab Results   Component Value Date    BUN 13 05/09/2024    BUN 8 08/28/2020    BUN 10 08/03/2019     Lab Results   Component  Value Date    CREATSERUM 0.96 05/09/2024    CREATSERUM 0.96 08/28/2020    CREATSERUM 1.20 08/03/2019      Lab Results   Component Value Date    COLORUR Dark-Yellow 05/09/2024    CLARITY Turbid 05/09/2024    SPECGRAVITY 1.023 05/09/2024    GLUUR Normal 05/09/2024    BILUR Negative 05/09/2024    KETUR Negative 05/09/2024    BLOODURINE 3+ 05/09/2024    PHURINE 5.5 05/09/2024    PROUR 30 05/09/2024    UROBILINOGEN Normal 05/09/2024    NITRITE Negative 05/09/2024    LEUUR Negative 05/09/2024     Prior stone in 2019 was  70% calcium oxalate monohydrate,   10% calcium oxalate dihydrate, and   20% calcium phosphate (hydroxy- and carbonate- apatite).     Imaging:  CT ABDOMEN+PELVIS KIDNEYSTONE 2D RNDR(NO IV,NO ORAL)(CPT=74176)    Result Date: 5/10/2024  CONCLUSION:  Moderate right hydroureteronephrosis is likely present due to a 8 x 3 mm stone of the distal right ureter.  Additional nonobstructing bilateral renal calculi are present.  A preliminary report was provided by [facility].     LOCATION:  [facility]   Dictated by (CST): Davide Sellers MD on 5/10/2024 at 6:11 AM     Finalized by (CST): Davide Sellers MD on 5/10/2024 at 6:15 AM          Impression:  Patient Active Problem List   Diagnosis    UTI (urinary tract infection)    Kidney stones    Intractable low back pain    Bulge of lumbar disc without myelopathy    Sciatica of left side    Kidney stone    Hypokalemia    Intractable pain       Right ureter stone  Intrarenal bilateral stones  History of calcium stones  Desires surgery      Recommendations:    Surgical risks, benefits and alternatives discussed.  Risks of ureteroscopy including but not limited to infection, bleeding, perforation, possible need for stent, need for subsequent removal of stent, ureteral spasm, ureteral injury or stricture discussed with patient along with risks of anesthesia which could include death.       Patient will schedule cystoscopy, right ureteroscopy, stone extraction, possible stent  placement and possible laser lithotripsy at the patient's earliest convenience.  He is NPO. We will proceed this PM.  He may be able to remove his own stent post op.  He may be able to dc home tonight with wife.    Thank you for allowing me to participate in the care of your patient.    Choco Gonzalez MD  5/10/2024  6:44 PM

## 2024-05-10 NOTE — ED PROVIDER NOTES
Patient Seen in: [facility] Emergency Department      History     Chief Complaint   Patient presents with    Abdomen/Flank Pain     Stated Complaint: \"kidney stone\", R sided abd pain    Subjective:   HPI    Patient is a 59-year-old male presenting to the ED complaining of right lower quadrant abdominal pain that radiates around to the back.  Patient states the pain is similar to when he had kidney stones in the past.  It has been several years since his last kidney stone and urology follow-up.  He notes that he has had increased urinary frequency going more often and smaller amounts as well as dark urine which she suspect is due to hematuria.  His pain is constant, severe, has worsened over the last few days, without any identified exacerbating or alleviating factors.  He does have associated nausea without vomiting.  No fevers or chills.    Objective:   Past Medical History:    Back pain    Cancer (HCC)    DVT (deep venous thrombosis) (HCC)    Fibromyalgia    Herniated cervical disc    C5    Herniated lumbar intervertebral disc    History of blood clots    bilateral legs & r.arm    Kidney calculus    Leukemia (HCC)    Unspecified sleep apnea              Past Surgical History:   Procedure Laterality Date    Repair ing hernia,5+y/o,reducibl                  Social History     Socioeconomic History    Marital status:    Tobacco Use    Smoking status: Never    Smokeless tobacco: Never   Substance and Sexual Activity    Alcohol use: No     Social Determinants of Health      [facility] [employer] [employer]    [facility]              Review of Systems    Positive for stated complaint: \"kidney stone\", R sided abd pain  Other systems are as noted in HPI.  Constitutional and vital signs reviewed.      All other systems reviewed and negative except as noted above.    Physical Exam     ED Triage Vitals [05/09/24 2300]   BP (!) 180/100   Pulse 69   Resp 20   Temp 96.8 °F (36 °C)   Temp src Temporal    SpO2 100 %   O2 Device None (Room air)       Current Vitals:   Vital Signs  BP: 154/84  Pulse: 74  Resp: 14  Temp: 96.8 °F (36 °C)  Temp src: Temporal  MAP (mmHg): (!) 103    Oxygen Therapy  SpO2: 99 %  O2 Device: None (Room air)            Physical Exam  Vitals and nursing note reviewed.   Constitutional:       General: He is not in acute distress.     Appearance: Normal appearance. He is not ill-appearing.   HENT:      Head: Normocephalic and atraumatic.      Right Ear: External ear normal.      Left Ear: External ear normal.      Nose: Nose normal.      Mouth/Throat:      Mouth: Mucous membranes are moist.      Pharynx: Oropharynx is clear. No posterior oropharyngeal erythema.   Eyes:      Conjunctiva/sclera: Conjunctivae normal.   Cardiovascular:      Rate and Rhythm: Normal rate and regular rhythm.   Pulmonary:      Effort: Pulmonary effort is normal. No respiratory distress.      Breath sounds: Normal breath sounds.   Abdominal:      General: Abdomen is flat. Bowel sounds are normal. There is no distension.      Palpations: Abdomen is soft.      Tenderness: There is abdominal tenderness (RLQ). There is no right CVA tenderness, left CVA tenderness or guarding.   Musculoskeletal:      Right lower leg: No edema.      Left lower leg: No edema.   Skin:     General: Skin is warm.      Capillary Refill: Capillary refill takes less than 2 seconds.      Findings: No rash.   Neurological:      General: No focal deficit present.      Mental Status: He is alert and oriented to person, place, and time.   Psychiatric:         Mood and Affect: Mood normal.         Behavior: Behavior normal.               ED Course     Labs Reviewed   COMP METABOLIC PANEL (14) - Abnormal; Notable for the following components:       Result Value    Glucose 131 (*)     All other components within normal limits   URINALYSIS WITH CULTURE REFLEX - Abnormal; Notable for the following components:    Clarity Urine Turbid (*)     Blood Urine 3+ (*)      Protein Urine 30 (*)     RBC Urine >10 (*)     All other components within normal limits   LIPASE - Normal   CBC WITH DIFFERENTIAL WITH PLATELET    Narrative:     The following orders were created for panel order CBC With Differential With Platelet.  Procedure                               Abnormality         Status                     ---------                               -----------         ------                     CBC W/ DIFFERENTIAL[[id number]]                              Final result                 Please view results for these tests on the individual orders.   CBC W/ DIFFERENTIAL                      MDM      History obtained from patient.     Differential diagnosis includes kidney stone, appendicitis, pyelonephritis, musculoskeletal pain.    Previous records reviewed.  Patient was last seen by [facility] in 2019.    Testing considered and ordered includes CBC, CMP, UA, CT scan abdomen pelvis.    I reviewed all results.  UA with presence of blood and RBCs, no evidence for UTI.  CBC and CMP are unremarkable.  Lipase was also performed per triage protocol which was negative    Admission disposition: 5/10/2024  3:54 AM         Radiology report was reviewed as noted below  CT ABDOMEN PELVIS WITHOUT IV CONTRAST      IMPRESSION:  -3 x 4 x 7 mm obstructing distal right ureteral stone approximately 1 cm from the UVJ results in moderate hydroureteronephrosis.  Of note 4 mm is the larger short axis dimension which may be the obstruction diameter.    -Additional small nonobstructing bilateral renal calyceal stones.  -No aortic aneurysm.  -Colonic diverticulosis without evidence of acute diverticulitis.  -Normal appendix.  -Prior cholecystectomy.    Patient did receive Dilaudid, Zofran, IV fluids as well as Toradol.  Upon reassessment, the patient was still having severe pain rated as 8 out of 10.  He was given additional dose of Dilaudid.  States he does not feel comfortable going home due to his severe pain.   He does feel that oral medications will be effective.  Therefore he was hospitalized for intractable pain.  Case was discussed with hospitalist with plan for urology consultation.                           Medical Decision Making      Disposition and Plan     Clinical Impression:  1. Kidney stone    2. Intractable pain         Disposition:  Admit  5/10/2024  3:54 am    Follow-up:  No follow-up provider specified.        Medications Prescribed:  Current Discharge Medication List                            Hospital Problems       Present on Admission  Date Reviewed: 9/10/2019            ICD-10-CM Noted POA    * (Principal) Kidney stone N20.0 8/2/2019 Unknown

## 2024-05-10 NOTE — PLAN OF CARE
ER admission with Right Flank Pain/Nausea. Patient alert and oriented x4. Room air. Telemetry monitoring. SHAWN w/ CPAP. Non-cardiac electrolyte protocol. Last bowel movement 5/9. PRN dilaudid for pain management. Up with 1 assist and cane. Nothing by mouth (NPO) diet. Straining urine per orders. CT completed. Urology consulted. Procedure planned for 1800 on 5/10. Consents signed and placed on chart. Plan is home when medically stable.

## 2024-05-10 NOTE — ED QUICK NOTES
Orders for admission, patient is aware of plan and ready to go upstairs. Any questions, please call ED RN vandana at extension [phone].     Patient Covid vaccination status: Fully vaccinated     COVID Test Ordered in ED: None    COVID Suspicion at Admission: N/A    Running Infusions:      Mental Status/LOC at time of transport: A&O x3     Other pertinent information: PT NEEDS CPAP AT NIGHT   CIWA score: N/A   NIH score:  N/A

## 2024-05-10 NOTE — H&P
[facility]     Alonzo Quevedo Patient Status:  Emergency    3/21/1965 MRN [id number]   Location [facility] EMERGENCY DEPARTMENT Attending Jasmin Melgar DO   Hosp Day # 0 PCP Bruce Bernheim, MD     Chief Complaint: Right flank pain    Subjective:    History of Present Illness:     Alonzo Quevedo is a 59 year old male with a history of essential hypertension and VTE who presents with right flank pain. Patient states he has had right flank pain radiating to right groin x 4 days. Associated with nausea and vomiting. Patient thought he could pass stone, but pain worsened prompting ER evaluation. Patient states pain was 20/10, currently 8/10. No dysuria. He has noted blood.   No chest pain or shortness of breath.     History/Other:    Past Medical History:  Past Medical History:    Back pain    Cancer (HCC)    DVT (deep venous thrombosis) (HCC)    Fibromyalgia    Herniated cervical disc    C5    Herniated lumbar intervertebral disc    History of blood clots    bilateral legs & r.arm    Kidney calculus    Leukemia (HCC)    Unspecified sleep apnea     Past Surgical History:   Past Surgical History:   Procedure Laterality Date    Repair ing hernia,5+y/o,reducibl        Family History:   No family history on file.  Social History:    reports that he has never smoked. He has never used smokeless tobacco. He reports that he does not drink alcohol.     Allergies:   Allergies   Allergen Reactions    Cat Dander [Dander]        Medications:    No current facility-administered medications on file prior to encounter.     Current Outpatient Medications on File Prior to Encounter   Medication Sig Dispense Refill    losartan 50 MG Oral Tab Take 1 tablet (50 mg total) by mouth daily.      tamsulosin HCl 0.4 MG Oral Cap Take 1 capsule (0.4 mg total) by mouth daily.      TraMADol HCl 50 MG Oral Tab Take 1 tablet (50 mg total) by mouth every 6 (six) hours as needed for Pain.      rOPINIRole HCl  1 MG Oral Tab Take 1 tablet (1 mg total) by mouth nightly.  3    modafinil 200 MG Oral Tab Take 1 tablet (200 mg total) by mouth 2 (two) times daily. (Patient not taking: Reported on 5/9/2024)      DULoxetine 30 MG Oral Cap DR Particles Take 1 capsule (30 mg total) by mouth 2 (two) times daily. (Patient not taking: Reported on 5/9/2024)      DUPIXENT 300 MG/2ML Subcutaneous Solution Pen-injector Inject 300 mg into the skin 2 (two) times daily. (Patient not taking: Reported on 5/9/2024)      cyclobenzaprine 10 MG Oral Tab Take 1 tablet (10 mg total) by mouth daily as needed.      Phenazopyridine HCl 200 MG Oral Tab Take 1 tablet (200 mg total) by mouth 3 (three) times daily as needed for Pain. (Patient not taking: Reported on 1/8/2024) 21 tablet 0    gabapentin (NEURONTIN) 600 MG Oral Tab Take 600 mg by mouth nightly. (Patient not taking: Reported on 1/8/2024)         Review of Systems:   A comprehensive review of systems was completed.    Pertinent positives and negatives noted in the HPI.    Objective:   Physical Exam:    /84   Pulse 70   Temp 96.8 °F (36 °C) (Temporal)   Resp 10   Ht 5' 6\" (1.676 m)   Wt 177 lb (80.3 kg)   SpO2 99%   BMI 28.57 kg/m²   General: No acute distress, Alert  Respiratory: No rhonchi, no wheezes  Cardiovascular: S1, S2. Regular rate and rhythm  Abdomen: right CVA tenderness, right lower quadrant tenderness  Neuro: No new focal deficits  Extremities: No edema      Results:    Labs:      Labs Last 24 Hours:    Recent Labs   Lab 05/09/24 2314   RBC 5.29   HGB 15.5   HCT 45.6   MCV 86.2   MCH 29.3   MCHC 34.0   RDW 12.7   NEPRELIM 3.78   WBC 7.1   .0       Recent Labs   Lab 05/09/24 2314   *   BUN 13   CREATSERUM 0.96   EGFRCR 91   CA 9.2   ALB 4.1      K 4.0      CO2 30.0   ALKPHO 112   AST 31   ALT 30   BILT 1.3   TP 7.7       Lab Results   Component Value Date    INR 0.92 08/28/2020       No results for input(s): \"TROP\", \"TROPHS\", \"CK\" in the last  168 hours.    No results for input(s): \"TROP\", \"PBNP\" in the last 168 hours.    No results for input(s): \"PCT\" in the last 168 hours.    Imaging: Imaging data reviewed in Epic.    Assessment & Plan:      #Flank pain d/t right ureterolithiasis, obstructing, with moderate hydroureteronephrosis  -Admit  -NPO  -IVF  -Flomax  -Pain control  -Urology consult    #Essential hypertension    -Losartan  -Monitor hemodynamics    #History of VTE    #SHAWN    #DVT Px: SCDs    Plan of care discussed with patient and ER.    Tarun Way MD    Supplementary Documentation:     The 21st Century Cures Act makes medical notes like these available to patients in the interest of transparency. Please be advised this is a medical document. Medical documents are intended to carry relevant information, facts as evident, and the clinical opinion of the practitioner. The medical note is intended as peer to peer communication and may appear blunt or direct. It is written in medical language and may contain abbreviations or verbiage that are unfamiliar.

## 2024-05-11 ENCOUNTER — CLINICAL DOCUMENTATION (OUTPATIENT)
Dept: GERIATRIC MEDICINE | Age: 59
End: 2024-05-11

## 2024-05-11 DIAGNOSIS — R82.90 ABNORMAL URINE ODOR: ICD-10-CM

## 2024-05-11 DIAGNOSIS — N39.0 URINARY TRACT INFECTION WITHOUT HEMATURIA, SITE UNSPECIFIED: Primary | ICD-10-CM

## 2024-05-11 DIAGNOSIS — N20.0 NEPHROLITHIASIS: ICD-10-CM

## 2024-05-11 DIAGNOSIS — R31.21 ASYMPTOMATIC MICROSCOPIC HEMATURIA: ICD-10-CM

## 2024-05-11 NOTE — ANESTHESIA PROCEDURE NOTES
Airway  Date/Time: 5/10/2024 7:06 PM  Urgency: elective      General Information and Staff    Patient location during procedure: OR  Anesthesiologist: Mick Vargas MD  Performed: anesthesiologist   Performed by: Mick Vargas MD  Authorized by: Mick Vargas MD      Indications and Patient Condition  Indications for airway management: anesthesia  Sedation level: deep  Preoxygenated: yes  Patient position: sniffing  Mask difficulty assessment: 1 - vent by mask    Final Airway Details  Final airway type: supraglottic airway      Successful airway: classic  Size 4       Number of attempts at approach: 1  Number of other approaches attempted: 0

## 2024-05-11 NOTE — ANESTHESIA PREPROCEDURE EVALUATION
PRE-OP EVALUATION    Patient Name: Alonzo Quevedo    Admit Diagnosis: Kidney stone [N20.0]  Intractable pain [R52]    Pre-op Diagnosis: Ureteral calculi [N20.1]    CYSTOSCOPY, RIGHT RETROGRADE,PYELOGRAM, URETEROSCOPY, LASER LITHOTRIPSY, STONE EXTRACTION, INSERTION RIGHT URETERAL STENT    Anesthesia Procedure: CYSTOSCOPY, RIGHT RETROGRADE,PYELOGRAM, URETEROSCOPY, LASER LITHOTRIPSY, STONE EXTRACTION, INSERTION RIGHT URETERAL STENT (Right)    Surgeons and Role:     * Choco Gonzalez MD - Primary    Pre-op vitals reviewed.  Temp: 98.6 °F (37 °C)  Pulse: 68  Resp: 18  BP: 147/79  SpO2: 100 %  Body mass index is 28.57 kg/m².    Current medications reviewed.  Hospital Medications:   [COMPLETED] sodium chloride 0.9 % IV bolus 1,000 mL  1,000 mL Intravenous Once    [COMPLETED] ketorolac (Toradol) 15 MG/ML injection 15 mg  15 mg Intravenous Once    [COMPLETED] HYDROmorphone (Dilaudid) 1 MG/ML injection 1 mg  1 mg Intravenous Once    [COMPLETED] HYDROmorphone (Dilaudid) 1 MG/ML injection 1 mg  1 mg Intravenous Once    sodium chloride 0.9% infusion   Intravenous Continuous    [Transfer Hold] acetaminophen (Tylenol Extra Strength) tab 500 mg  500 mg Oral Q4H PRN    [Transfer Hold] HYDROmorphone (Dilaudid) 1 MG/ML injection 0.2 mg  0.2 mg Intravenous Q2H PRN    Or    [Transfer Hold] HYDROmorphone (Dilaudid) 1 MG/ML injection 0.4 mg  0.4 mg Intravenous Q2H PRN    Or    [Transfer Hold] HYDROmorphone (Dilaudid) 1 MG/ML injection 0.8 mg  0.8 mg Intravenous Q2H PRN    [Transfer Hold] ondansetron (Zofran) 4 MG/2ML injection 4 mg  4 mg Intravenous Q6H PRN    [Transfer Hold] prochlorperazine (Compazine) 10 MG/2ML injection 5 mg  5 mg Intravenous Q8H PRN    [Transfer Hold] polyethylene glycol (PEG 3350) (Miralax) 17 g oral packet 17 g  17 g Oral Daily PRN    [Transfer Hold] sennosides (Senokot) tab 17.2 mg  17.2 mg Oral Nightly PRN    [Transfer Hold] bisacodyl (Dulcolax) 10 MG rectal suppository 10 mg  10 mg Rectal Daily PRN    [Transfer  Hold] fleet enema (Fleet) 7-19 GM/118ML rectal enema 133 mL  1 enema Rectal Once PRN    [Transfer Hold] losartan (Cozaar) tab 50 mg  50 mg Oral Daily    [Transfer Hold] tamsulosin (Flomax) cap 0.4 mg  0.4 mg Oral Daily       Outpatient Medications:     Medications Prior to Admission   Medication Sig Dispense Refill Last Dose    albuterol 108 (90 Base) MCG/ACT Inhalation Aero Soln Inhale 2 puffs into the lungs every 4 (four) hours as needed for Wheezing or Shortness of Breath.       Linolenic Acid (OMEGA 3 OR) Take 2 capsules by mouth every morning.   5/9/2024 at 0600    NON FORMULARY Take 2 capsules by mouth every evening. Nutrafol (hair supplement)   5/9/2024 at 2100    losartan 50 MG Oral Tab Take 1 tablet (50 mg total) by mouth daily.   5/9/2024 at 0600    modafinil 200 MG Oral Tab Take 1 tablet (200 mg total) by mouth 2 (two) times daily.   Past Week at \"About 3-4 days ago\"    tamsulosin HCl 0.4 MG Oral Cap Take 1 capsule (0.4 mg total) by mouth daily.   5/9/2024 at 0600    TraMADol HCl 50 MG Oral Tab Take 1 tablet (50 mg total) by mouth 2 (two) times daily.   5/9/2024    rOPINIRole HCl 1 MG Oral Tab Take 2 tablets (2 mg total) by mouth nightly.  3 5/9/2024 at 2100    DUPIXENT 300 MG/2ML Subcutaneous Solution Pen-injector Inject 300 mg into the skin every 14 (fourteen) days.   4/14/2024    cyclobenzaprine 10 MG Oral Tab Take 1 tablet (10 mg total) by mouth 3 (three) times daily as needed for Muscle spasms.   More than a month at \"About 2 months ago\"       Allergies: Cat dander [dander] and Nickel      Anesthesia Evaluation    Patient summary reviewed.    Anesthetic Complications  (-) history of anesthetic complications         GI/Hepatic/Renal    Negative GI/hepatic/renal ROS.                             Cardiovascular        Exercise tolerance: good     MET: >4      (+) hypertension                                     Endo/Other    Negative endo/other ROS.                              Pulmonary      (+)  asthma              (+) sleep apnea and CPAP      Neuro/Psych      (+) depression  (+) anxiety                              Past Surgical History:   Procedure Laterality Date    Cystoscopy,fragmt ureteral stone Left 2019    URS, laser, stone removal, Dr. Landa    Lithotripsy      ESWL and URS in the past    Repair ing hernia,5+y/o,reducibl       Social History     Socioeconomic History    Marital status:    Tobacco Use    Smoking status: Never    Smokeless tobacco: Never   Substance and Sexual Activity    Alcohol use: No     History   Drug Use Not on file     Available pre-op labs reviewed.  Lab Results   Component Value Date    WBC 7.1 05/09/2024    RBC 5.29 05/09/2024    HGB 15.5 05/09/2024    HCT 45.6 05/09/2024    MCV 86.2 05/09/2024    MCH 29.3 05/09/2024    MCHC 34.0 05/09/2024    RDW 12.7 05/09/2024    .0 05/09/2024     Lab Results   Component Value Date     05/09/2024    K 4.0 05/09/2024     05/09/2024    CO2 30.0 05/09/2024    BUN 13 05/09/2024    CREATSERUM 0.96 05/09/2024     (H) 05/09/2024    CA 9.2 05/09/2024            Airway      Mallampati: III  Mouth opening: >3 FB  TM distance: > 6 cm  Neck ROM: full Cardiovascular    Cardiovascular exam normal.  Rhythm: regular  Rate: normal     Dental    Dentition appears grossly intact         Pulmonary    Pulmonary exam normal.  Breath sounds clear to auscultation bilaterally.               Other findings              ASA: 2   Plan: general  NPO status verified and patient meets guidelines.  Patient has not taken beta blockers in last 24 hours.  Post-procedure pain management plan discussed with surgeon and patient.    Comment: I spoke with the patient and discussed the risks of general anesthesia, which include nausea and vomiting; sore throat; injury to the lips, gums, teeth, and eyes; cardiac, pulmonary, and neurologic events; aspiration; and allergic reactions. The patient understands these risks and consents to  receiving general anesthesia for this procedure.  Plan/risks discussed with: patient and spouse                Present on Admission:  **None**

## 2024-05-11 NOTE — PLAN OF CARE
Returned from PACU in stable condition. Tolerating room air. DTV. Family at bedside.     Voided x1. Feels okay to go home. Scripts sent to pharmacy [facility] in [place]. Pt verbalized understanding. Reviewed discharge instructions, denied any further questions.         NURSING DISCHARGE NOTE    Discharged Home via Wheelchair.  Accompanied by Family member and RN  Belongings Taken by patient/family.

## 2024-05-11 NOTE — OPERATIVE REPORT
[facility]   part of [facility]    Operative Note         Alonzo Quevedo Location: OR   [facility] [id number] MRN [id number]   Admission Date 5/10/2024 Operation Date 5/10/2024   Attending Physician Scott Nuñez MD       Patient Name: Alonzo Quevedo     Preoperative Diagnosis: Ureteral calculi [N20.1]     Postoperative Diagnosis: Ureteral calculi [N20.1]     Procedure(s):  CYSTOSCOPY, RIGHT RETROGRADE,PYELOGRAM, URETEROSCOPY, STONE EXTRACTION     Primary Surgeon: Choco Gonzalez MD           Anesthesia: General     Specimen:   ID Type Source Tests Collected by Time Destination   1 : right ureter stone Calculus Stone (calculus) CALCULI, URINARY, SURGICAL PATHOLOGY TISSUE Choco Gonzalez MD 5/10/2024 192         Estimated Blood Loss: Blood Output: 1 mL (5/10/2024  7:27 PM)       Complications: none      Indications for procedure: intractable pain from right ureter stone     Surgical Findings: right ureter stone  at the UO     Complexity: NA (optional)    Operative Summary:       SPECIMENS: right ureter Stone.      DRAINS: none     ESTIMATED BLOOD LOSS: Less than 5 mL.     INDICATION FOR PROCEDURE: Alonzo is a 59 year old year old year-old male who was diagnosed with right ureter stone that has not passed. Having hematuria. Desires surgical intervention.  Understands the risks of the procedure, including but not limited to the general anesthetic involved, urinary bleeding, infection, bladder injury, ureteral injury, renal injury, inability to extract the large stone due to its large nature, the need for laser lithotripsy with subsequent passage of particles, the possible need for ureteral stent with subsequent removal, the risks for ureteral stricture disease, stone persistence, re-formation, and the unilateral nature of this procedure. Would like to proceed. Right side confirmed and marked     PROCEDURE IN DETAIL: The patient was brought to the operating room after being correctly identified and surgical consent  was obtained. All questions were answered. General anesthesia was induced without difficulty. Legs were placed in the dorsal lithotomy position, padded and secured, and the working area was prepped and draped in the usual sterile fashion. Examination by  fluoroscopy could not clearly identify the stone. A 22-Pashto scope was inserted per urethra. The urethra was normal. Prostate is slightly high riding at the bladder neck. Upon entry into the bladder, the urine was blood tinged. Panendoscopy was performed and showed no evidence of any bladder tumors, diverticula, or cystitis. The ureteral orifice on the left was normal.  On the right there is a stone emanating from the orifice.  Wife was passed.  Semirigid ureteroscope was passed.  Stone was extracted.  Retrograde pyelogram was performed showing a dilated lower ureter, less distended into the renal pelvis. But now without the stone there was free flow down the orifice.  I held off on placing a ureter stent. Lidocaine jelly was instilled per urethra. The patient was returned to the supine position, extubated, and transferred to the recovery room in stable condition. I was present and performed the entire procedure as stated above. There were no complications.             Implants: * No implants in log *     Drains: none     Condition: stable       Choco Gonzalez MD

## 2024-05-11 NOTE — DISCHARGE INSTRUCTIONS
Your stone was extracted today  Stone analysis is pending  Call the outpatient office and a 24h urine testing to help with future stone prevention can be ordered for future followup    You had a procedure called a CYSTOSCOPY today due to right ureter stone     - You may experience pain after the procedure for 1-2 days. If pain becomes intolerable please contact our office or go to the nearest Emergency Room/Immediate Care.     - You may experience burning with urination and frequency of urination over the next few days.    - You may see blood in your urine that will clear up within 1-2 days.    - Abstain from alcohol, coffee, tea, artificial sweeteners, and spicy food for the next 48 hours.    - If you develop a fever, chills, difficulty urinating or intolerable abdominal pain in the next 24hours, call the office or come back to the emergency room.    - Drink 1.5 to 2 liters of fluid today, water is preferable.      It is recommended that you follow up with [facility] in the next 1-2 months.   You can schedule your next visit by calling phone number [phone].    Thank you for the pleasure of allowing us to be involved in your care.     MD [facility]

## 2024-05-11 NOTE — ANESTHESIA POSTPROCEDURE EVALUATION
[facility]    Alonzo Quevedo Patient Status:  Observation   Age/Gender 59 year old male MRN [id number]   Location [facility] SURGERY Attending Scott Nuñez MD   Hosp Day # 0 PCP Bruce Bernheim, MD       Anesthesia Post-op Note    CYSTOSCOPY, RIGHT RETROGRADE,PYELOGRAM, URETEROSCOPY, STONE EXTRACTION    Procedure Summary       Date: 05/10/24 Room / Location:  MAIN OR 07 / [facility] MAIN OR    Anesthesia Start: 1855 Anesthesia Stop: 1956    Procedure: CYSTOSCOPY, RIGHT RETROGRADE,PYELOGRAM, URETEROSCOPY, STONE EXTRACTION (Right: Ureter) Diagnosis:       Ureteral calculi      (Ureteral calculi [N20.1])    Surgeons: Choco Gonzalez MD Anesthesiologist: Mick Vargas MD    Anesthesia Type: general ASA Status: 2            Anesthesia Type: general    Vitals Value Taken Time   /72 05/10/24 1957   Temp 98 °F (36.7 °C) 05/10/24 1957   Pulse 67 05/10/24 1957   Resp 13 05/10/24 1957   SpO2 100 % 05/10/24 1957   Vitals shown include unfiled device data.    Patient Location: PACU    Anesthesia Type: general    Airway Patency: patent and extubated    Postop Pain Control: adequate    Mental Status: mildly sedated but able to meaningfully participate in the post-anesthesia evaluation    Nausea/Vomiting: none    Cardiopulmonary/Hydration status: stable euvolemic    Complications: no apparent anesthesia related complications    Postop vital signs: stable    Dental Exam: Unchanged from Preop    Patient to be discharged from PACU when criteria met.

## 2024-05-18 LAB
CAOX DIHYDRATE: 30 %
CAOX MONOHYDRATE: 70 %
WEIGHT-STONE: 66 MG

## 2024-05-21 DIAGNOSIS — I10 BENIGN ESSENTIAL HYPERTENSION: ICD-10-CM

## 2024-05-21 RX ORDER — LOSARTAN POTASSIUM 50 MG/1
50 TABLET ORAL DAILY
Qty: 90 TABLET | Refills: 0 | Status: SHIPPED | OUTPATIENT
Start: 2024-05-21

## 2024-05-22 ENCOUNTER — CLINICAL DOCUMENTATION (OUTPATIENT)
Dept: GERIATRIC MEDICINE | Age: 59
End: 2024-05-22

## 2024-05-22 ENCOUNTER — OFFICE VISIT (OUTPATIENT)
Dept: HEMATOLOGY/ONCOLOGY | Age: 59
End: 2024-05-22
Attending: INTERNAL MEDICINE

## 2024-05-22 ENCOUNTER — APPOINTMENT (OUTPATIENT)
Dept: LAB | Age: 59
End: 2024-05-22

## 2024-05-22 VITALS
HEART RATE: 71 BPM | OXYGEN SATURATION: 100 % | BODY MASS INDEX: 28.08 KG/M2 | RESPIRATION RATE: 16 BRPM | DIASTOLIC BLOOD PRESSURE: 86 MMHG | WEIGHT: 173.94 LBS | TEMPERATURE: 96.9 F | SYSTOLIC BLOOD PRESSURE: 133 MMHG

## 2024-05-22 DIAGNOSIS — C85.93 LYMPHOMA OF INTRA-ABDOMINAL LYMPH NODES, UNSPECIFIED LYMPHOMA TYPE  (CMD): Primary | ICD-10-CM

## 2024-05-22 DIAGNOSIS — C82.03 FOLLICULAR LYMPHOMA GRADE I OF INTRA-ABDOMINAL LYMPH NODES  (CMD): Primary | ICD-10-CM

## 2024-05-22 DIAGNOSIS — C95.91 LEUKEMIA IN REMISSION, UNSPECIFIED LEUKEMIA TYPE  (CMD): ICD-10-CM

## 2024-05-22 DIAGNOSIS — C82.03 FOLLICULAR LYMPHOMA GRADE I OF INTRA-ABDOMINAL LYMPH NODES  (CMD): ICD-10-CM

## 2024-05-22 DIAGNOSIS — Z85.6 HISTORY OF ACUTE MYELOID LEUKEMIA: ICD-10-CM

## 2024-05-22 PROBLEM — Z23 NEED FOR PROPHYLACTIC VACCINATION AGAINST HEPATITIS B VIRUS: Status: RESOLVED | Noted: 2023-01-26 | Resolved: 2024-05-22

## 2024-05-22 PROBLEM — R41.89 COGNITIVE IMPAIRMENT: Status: RESOLVED | Noted: 2022-03-23 | Resolved: 2024-05-22

## 2024-05-22 PROBLEM — R07.9 CHEST PAIN, UNSPECIFIED TYPE: Status: RESOLVED | Noted: 2022-07-26 | Resolved: 2024-05-22

## 2024-05-22 PROBLEM — U07.1 COVID-19 VIRUS INFECTION: Status: RESOLVED | Noted: 2022-01-20 | Resolved: 2024-05-22

## 2024-05-22 PROBLEM — R31.21 ASYMPTOMATIC MICROSCOPIC HEMATURIA: Status: RESOLVED | Noted: 2022-03-24 | Resolved: 2024-05-22

## 2024-05-22 PROBLEM — R07.9 CHEST PAIN: Status: RESOLVED | Noted: 2022-07-26 | Resolved: 2024-05-22

## 2024-05-22 PROBLEM — G47.19 EXCESSIVE DAYTIME SLEEPINESS: Status: RESOLVED | Noted: 2024-04-01 | Resolved: 2024-05-22

## 2024-05-22 PROBLEM — R53.83 FATIGUE: Status: RESOLVED | Noted: 2018-10-24 | Resolved: 2024-05-22

## 2024-05-22 PROBLEM — R41.3 MEMORY LOSS: Status: RESOLVED | Noted: 2022-10-15 | Resolved: 2024-05-22

## 2024-05-22 PROBLEM — Z79.899 ANALGESIC USE: Status: RESOLVED | Noted: 2024-02-09 | Resolved: 2024-05-22

## 2024-05-22 LAB
ALBUMIN SERPL-MCNC: 4.4 G/DL (ref 3.6–5.1)
ALBUMIN/GLOB SERPL: 1.3 {RATIO} (ref 1–2.4)
ALP SERPL-CCNC: 105 UNITS/L (ref 45–117)
ALT SERPL-CCNC: 25 UNITS/L
ANION GAP SERPL CALC-SCNC: 13 MMOL/L (ref 7–19)
AST SERPL-CCNC: 20 UNITS/L
BASOPHILS # BLD: 0.1 K/MCL (ref 0–0.3)
BASOPHILS NFR BLD: 1 %
BILIRUB SERPL-MCNC: 2.4 MG/DL (ref 0.2–1)
BUN SERPL-MCNC: 12 MG/DL (ref 6–20)
BUN/CREAT SERPL: 13 (ref 7–25)
CALCIUM SERPL-MCNC: 9.5 MG/DL (ref 8.4–10.2)
CHLORIDE SERPL-SCNC: 106 MMOL/L (ref 97–110)
CO2 SERPL-SCNC: 25 MMOL/L (ref 21–32)
CREAT SERPL-MCNC: 0.89 MG/DL (ref 0.67–1.17)
DEPRECATED RDW RBC: 40.2 FL (ref 39–50)
EGFRCR SERPLBLD CKD-EPI 2021: >90 ML/MIN/{1.73_M2}
EOSINOPHIL # BLD: 0.2 K/MCL (ref 0–0.5)
EOSINOPHIL NFR BLD: 2 %
ERYTHROCYTE [DISTWIDTH] IN BLOOD: 12.8 % (ref 11–15)
FASTING DURATION TIME PATIENT: ABNORMAL H
GLOBULIN SER-MCNC: 3.4 G/DL (ref 2–4)
GLUCOSE SERPL-MCNC: 84 MG/DL (ref 70–99)
HCT VFR BLD CALC: 45.7 % (ref 39–51)
HGB BLD-MCNC: 15.5 G/DL (ref 13–17)
IMM GRANULOCYTES # BLD AUTO: 0 K/MCL (ref 0–0.2)
IMM GRANULOCYTES # BLD: 0 %
LDH SERPL L TO P-CCNC: 190 UNITS/L (ref 86–234)
LYMPHOCYTES # BLD: 1.9 K/MCL (ref 1–4)
LYMPHOCYTES NFR BLD: 19 %
MCH RBC QN AUTO: 29.5 PG (ref 26–34)
MCHC RBC AUTO-ENTMCNC: 33.9 G/DL (ref 32–36.5)
MCV RBC AUTO: 86.9 FL (ref 78–100)
MONOCYTES # BLD: 0.5 K/MCL (ref 0.3–0.9)
MONOCYTES NFR BLD: 5 %
NEUTROPHILS # BLD: 7.4 K/MCL (ref 1.8–7.7)
NEUTROPHILS NFR BLD: 73 %
NRBC BLD MANUAL-RTO: 0 /100 WBC
PLATELET # BLD AUTO: 183 K/MCL (ref 140–450)
POTASSIUM SERPL-SCNC: 4.2 MMOL/L (ref 3.4–5.1)
PROT SERPL-MCNC: 7.8 G/DL (ref 6.4–8.2)
RBC # BLD: 5.26 MIL/MCL (ref 4.5–5.9)
SODIUM SERPL-SCNC: 140 MMOL/L (ref 135–145)
WBC # BLD: 10 K/MCL (ref 4.2–11)

## 2024-05-22 PROCEDURE — 99211 OFF/OP EST MAY X REQ PHY/QHP: CPT

## 2024-05-22 PROCEDURE — 99214 OFFICE O/P EST MOD 30 MIN: CPT | Performed by: INTERNAL MEDICINE

## 2024-05-22 PROCEDURE — 3075F SYST BP GE 130 - 139MM HG: CPT | Performed by: INTERNAL MEDICINE

## 2024-05-22 PROCEDURE — 3079F DIAST BP 80-89 MM HG: CPT | Performed by: INTERNAL MEDICINE

## 2024-05-22 PROCEDURE — 36415 COLL VENOUS BLD VENIPUNCTURE: CPT | Performed by: INTERNAL MEDICINE

## 2024-05-22 PROCEDURE — 80053 COMPREHEN METABOLIC PANEL: CPT | Performed by: CLINICAL MEDICAL LABORATORY

## 2024-05-22 PROCEDURE — 83615 LACTATE (LD) (LDH) ENZYME: CPT | Performed by: CLINICAL MEDICAL LABORATORY

## 2024-05-22 ASSESSMENT — PAIN SCALES - GENERAL: PAINLEVEL: 7

## 2024-05-22 ASSESSMENT — PATIENT HEALTH QUESTIONNAIRE - PHQ9
SUM OF ALL RESPONSES TO PHQ9 QUESTIONS 1 AND 2: 4
CLINICAL INTERPRETATION OF PHQ2 SCORE: FURTHER SCREENING NEEDED
2. FEELING DOWN, DEPRESSED OR HOPELESS: MORE THAN HALF THE DAYS
SUM OF ALL RESPONSES TO PHQ9 QUESTIONS 1 AND 2: 4
1. LITTLE INTEREST OR PLEASURE IN DOING THINGS: MORE THAN HALF THE DAYS

## 2024-06-17 DIAGNOSIS — G25.81 RESTLESS LEGS SYNDROME: ICD-10-CM

## 2024-06-17 RX ORDER — ROPINIROLE 1 MG/1
2 TABLET, FILM COATED ORAL NIGHTLY
Qty: 172 TABLET | Refills: 1 | Status: SHIPPED | OUTPATIENT
Start: 2024-06-17 | End: 2024-06-17 | Stop reason: SDUPTHER

## 2024-06-17 RX ORDER — ROPINIROLE 1 MG/1
1 TABLET, FILM COATED ORAL AT BEDTIME
Qty: 172 TABLET | Refills: 1 | Status: SHIPPED | OUTPATIENT
Start: 2024-06-17

## 2024-06-29 ENCOUNTER — CLINICAL DOCUMENTATION (OUTPATIENT)
Dept: GERIATRIC MEDICINE | Age: 59
End: 2024-06-29

## 2024-07-03 ENCOUNTER — APPOINTMENT (OUTPATIENT)
Dept: INTERNAL MEDICINE | Age: 59
End: 2024-07-03

## 2024-07-03 ENCOUNTER — LAB SERVICES (OUTPATIENT)
Dept: LAB | Age: 59
End: 2024-07-03

## 2024-07-03 VITALS
TEMPERATURE: 96.2 F | HEART RATE: 70 BPM | BODY MASS INDEX: 28 KG/M2 | DIASTOLIC BLOOD PRESSURE: 95 MMHG | WEIGHT: 174.2 LBS | SYSTOLIC BLOOD PRESSURE: 149 MMHG | HEIGHT: 66 IN

## 2024-07-03 DIAGNOSIS — J45.20 MILD INTERMITTENT ASTHMA WITHOUT COMPLICATION (CMD): ICD-10-CM

## 2024-07-03 DIAGNOSIS — I10 BENIGN ESSENTIAL HYPERTENSION: ICD-10-CM

## 2024-07-03 DIAGNOSIS — N13.8 BPH WITH OBSTRUCTION/LOWER URINARY TRACT SYMPTOMS: ICD-10-CM

## 2024-07-03 DIAGNOSIS — Z23 NEED FOR PROPHYLACTIC VACCINATION AGAINST HEPATITIS B VIRUS: ICD-10-CM

## 2024-07-03 DIAGNOSIS — E66.3 OVERWEIGHT WITH BODY MASS INDEX (BMI) OF 28 TO 28.9 IN ADULT: ICD-10-CM

## 2024-07-03 DIAGNOSIS — M48.061 SPINAL STENOSIS OF LUMBAR REGION, UNSPECIFIED WHETHER NEUROGENIC CLAUDICATION PRESENT: ICD-10-CM

## 2024-07-03 DIAGNOSIS — M54.32 SCIATICA OF LEFT SIDE: ICD-10-CM

## 2024-07-03 DIAGNOSIS — D12.6 TUBULAR ADENOMA OF COLON: ICD-10-CM

## 2024-07-03 DIAGNOSIS — N20.0 NEPHROLITHIASIS: ICD-10-CM

## 2024-07-03 DIAGNOSIS — M54.50 BILATERAL LOW BACK PAIN WITHOUT SCIATICA, UNSPECIFIED CHRONICITY: ICD-10-CM

## 2024-07-03 DIAGNOSIS — N40.1 BPH WITH OBSTRUCTION/LOWER URINARY TRACT SYMPTOMS: ICD-10-CM

## 2024-07-03 DIAGNOSIS — C92.01 ACUTE MYELOID LEUKEMIA IN REMISSION  (CMD): ICD-10-CM

## 2024-07-03 DIAGNOSIS — Z00.00 ENCOUNTER FOR GENERAL HEALTH EXAMINATION: Primary | ICD-10-CM

## 2024-07-03 DIAGNOSIS — G47.33 OBSTRUCTIVE SLEEP APNEA: ICD-10-CM

## 2024-07-03 PROCEDURE — 81001 URINALYSIS AUTO W/SCOPE: CPT | Performed by: CLINICAL MEDICAL LABORATORY

## 2024-07-03 PROCEDURE — 84153 ASSAY OF PSA TOTAL: CPT | Performed by: CLINICAL MEDICAL LABORATORY

## 2024-07-03 PROCEDURE — 83036 HEMOGLOBIN GLYCOSYLATED A1C: CPT | Performed by: CLINICAL MEDICAL LABORATORY

## 2024-07-03 PROCEDURE — 36415 COLL VENOUS BLD VENIPUNCTURE: CPT | Performed by: INTERNAL MEDICINE

## 2024-07-03 PROCEDURE — 80061 LIPID PANEL: CPT | Performed by: CLINICAL MEDICAL LABORATORY

## 2024-07-03 PROCEDURE — 80050 GENERAL HEALTH PANEL: CPT | Performed by: CLINICAL MEDICAL LABORATORY

## 2024-07-03 RX ORDER — CYCLOBENZAPRINE HCL 10 MG
TABLET ORAL
Qty: 50 TABLET | Refills: 1 | Status: SHIPPED | OUTPATIENT
Start: 2024-07-03

## 2024-07-03 ASSESSMENT — ENCOUNTER SYMPTOMS
PHOTOPHOBIA: 0
VOMITING: 0
CHEST TIGHTNESS: 0
TREMORS: 0
FEVER: 0
SORE THROAT: 0
FATIGUE: 0
TROUBLE SWALLOWING: 0
NUMBNESS: 0
ABDOMINAL PAIN: 0
CONSTIPATION: 0
EYE DISCHARGE: 0
SINUS PRESSURE: 0
SHORTNESS OF BREATH: 0
BRUISES/BLEEDS EASILY: 0
FACIAL SWELLING: 0
HEADACHES: 0
RECTAL PAIN: 0
EYE PAIN: 0
ADENOPATHY: 0
SLEEP DISTURBANCE: 0
SEIZURES: 0
DIARRHEA: 0
CONFUSION: 0

## 2024-07-03 ASSESSMENT — PATIENT HEALTH QUESTIONNAIRE - PHQ9
SUM OF ALL RESPONSES TO PHQ9 QUESTIONS 1 AND 2: 0
SUM OF ALL RESPONSES TO PHQ9 QUESTIONS 1 AND 2: 0
2. FEELING DOWN, DEPRESSED OR HOPELESS: NOT AT ALL
1. LITTLE INTEREST OR PLEASURE IN DOING THINGS: NOT AT ALL
CLINICAL INTERPRETATION OF PHQ2 SCORE: NO FURTHER SCREENING NEEDED

## 2024-07-03 ASSESSMENT — PAIN SCALES - GENERAL: PAINLEVEL: 6

## 2024-07-04 ENCOUNTER — CLINICAL DOCUMENTATION (OUTPATIENT)
Dept: GERIATRIC MEDICINE | Age: 59
End: 2024-07-04

## 2024-07-04 DIAGNOSIS — R31.21 ASYMPTOMATIC MICROSCOPIC HEMATURIA: ICD-10-CM

## 2024-07-04 DIAGNOSIS — E66.3 OVERWEIGHT WITH BODY MASS INDEX (BMI) OF 28 TO 28.9 IN ADULT: Primary | ICD-10-CM

## 2024-07-04 DIAGNOSIS — N20.0 NEPHROLITHIASIS: ICD-10-CM

## 2024-07-04 LAB
ALBUMIN SERPL-MCNC: 4.3 G/DL (ref 3.6–5.1)
ALBUMIN/GLOB SERPL: 1.5 {RATIO} (ref 1–2.4)
ALP SERPL-CCNC: 98 UNITS/L (ref 45–117)
ALT SERPL-CCNC: 27 UNITS/L
ANION GAP SERPL CALC-SCNC: 10 MMOL/L (ref 7–19)
APPEARANCE UR: CLEAR
AST SERPL-CCNC: 19 UNITS/L
BACTERIA #/AREA URNS HPF: NORMAL /HPF
BASOPHILS # BLD: 0.1 K/MCL (ref 0–0.3)
BASOPHILS NFR BLD: 1 %
BILIRUB SERPL-MCNC: 1.5 MG/DL (ref 0.2–1)
BILIRUB UR QL STRIP: NEGATIVE
BUN SERPL-MCNC: 10 MG/DL (ref 6–20)
BUN/CREAT SERPL: 11 (ref 7–25)
CALCIUM SERPL-MCNC: 9.6 MG/DL (ref 8.4–10.2)
CHLORIDE SERPL-SCNC: 105 MMOL/L (ref 97–110)
CHOLEST SERPL-MCNC: 186 MG/DL
CHOLEST/HDLC SERPL: 2.9 {RATIO}
CO2 SERPL-SCNC: 28 MMOL/L (ref 21–32)
COLOR UR: NORMAL
CREAT SERPL-MCNC: 0.88 MG/DL (ref 0.67–1.17)
DEPRECATED RDW RBC: 42.8 FL (ref 39–50)
EGFRCR SERPLBLD CKD-EPI 2021: >90 ML/MIN/{1.73_M2}
EOSINOPHIL # BLD: 0.3 K/MCL (ref 0–0.5)
EOSINOPHIL NFR BLD: 3 %
ERYTHROCYTE [DISTWIDTH] IN BLOOD: 13.5 % (ref 11–15)
FASTING DURATION TIME PATIENT: 12 HOURS (ref 0–999)
GLOBULIN SER-MCNC: 2.8 G/DL (ref 2–4)
GLUCOSE SERPL-MCNC: 81 MG/DL (ref 70–99)
GLUCOSE UR STRIP-MCNC: NEGATIVE MG/DL
HBA1C MFR BLD: 5 % (ref 4.5–5.6)
HCT VFR BLD CALC: 46.2 % (ref 39–51)
HDLC SERPL-MCNC: 64 MG/DL
HGB BLD-MCNC: 15.3 G/DL (ref 13–17)
HGB UR QL STRIP: NEGATIVE
HYALINE CASTS #/AREA URNS LPF: NORMAL /LPF
IMM GRANULOCYTES # BLD AUTO: 0.1 K/MCL (ref 0–0.2)
IMM GRANULOCYTES # BLD: 1 %
KETONES UR STRIP-MCNC: NEGATIVE MG/DL
LDLC SERPL CALC-MCNC: 108 MG/DL
LEUKOCYTE ESTERASE UR QL STRIP: NEGATIVE
LYMPHOCYTES # BLD: 2.1 K/MCL (ref 1–4)
LYMPHOCYTES NFR BLD: 25 %
MCH RBC QN AUTO: 28.9 PG (ref 26–34)
MCHC RBC AUTO-ENTMCNC: 33.1 G/DL (ref 32–36.5)
MCV RBC AUTO: 87.3 FL (ref 78–100)
MONOCYTES # BLD: 0.5 K/MCL (ref 0.3–0.9)
MONOCYTES NFR BLD: 6 %
MUCOUS THREADS URNS QL MICRO: PRESENT
NEUTROPHILS # BLD: 5.5 K/MCL (ref 1.8–7.7)
NEUTROPHILS NFR BLD: 64 %
NITRITE UR QL STRIP: NEGATIVE
NONHDLC SERPL-MCNC: 122 MG/DL
NRBC BLD MANUAL-RTO: 0 /100 WBC
PH UR STRIP: 6 [PH] (ref 5–7)
PLATELET # BLD AUTO: 145 K/MCL (ref 140–450)
POTASSIUM SERPL-SCNC: 3.8 MMOL/L (ref 3.4–5.1)
PROT SERPL-MCNC: 7.1 G/DL (ref 6.4–8.2)
PROT UR STRIP-MCNC: NEGATIVE MG/DL
PSA SERPL-MCNC: 1.51 NG/ML
RBC # BLD: 5.29 MIL/MCL (ref 4.5–5.9)
RBC #/AREA URNS HPF: NORMAL /HPF
SODIUM SERPL-SCNC: 139 MMOL/L (ref 135–145)
SP GR UR STRIP: 1.01 (ref 1–1.03)
SQUAMOUS #/AREA URNS HPF: NORMAL /HPF
TRIGL SERPL-MCNC: 71 MG/DL
TSH SERPL-ACNC: 0.74 MCUNITS/ML (ref 0.35–5)
UROBILINOGEN UR STRIP-MCNC: 0.2 MG/DL
WBC # BLD: 8.4 K/MCL (ref 4.2–11)
WBC #/AREA URNS HPF: NORMAL /HPF

## 2024-08-06 DIAGNOSIS — M50.30 DDD (DEGENERATIVE DISC DISEASE), CERVICAL: ICD-10-CM

## 2024-08-06 DIAGNOSIS — M17.0 PRIMARY OSTEOARTHRITIS OF BOTH KNEES: ICD-10-CM

## 2024-08-06 RX ORDER — TRAMADOL HYDROCHLORIDE 50 MG/1
50 TABLET ORAL 2 TIMES DAILY
Qty: 60 TABLET | Refills: 0 | Status: CANCELLED | OUTPATIENT
Start: 2024-08-06

## 2024-08-06 RX ORDER — ALBUTEROL SULFATE 90 UG/1
2 AEROSOL, METERED RESPIRATORY (INHALATION) EVERY 4 HOURS PRN
Qty: 6.7 EACH | Refills: 3 | Status: SHIPPED | OUTPATIENT
Start: 2024-08-06 | End: 2024-08-06 | Stop reason: SDUPTHER

## 2024-08-07 RX ORDER — ALBUTEROL SULFATE 90 UG/1
2 AEROSOL, METERED RESPIRATORY (INHALATION) EVERY 4 HOURS PRN
Qty: 6.7 EACH | Refills: 3 | Status: SHIPPED | OUTPATIENT
Start: 2024-08-07

## 2024-08-09 DIAGNOSIS — M17.0 PRIMARY OSTEOARTHRITIS OF BOTH KNEES: ICD-10-CM

## 2024-08-09 DIAGNOSIS — M50.30 DDD (DEGENERATIVE DISC DISEASE), CERVICAL: ICD-10-CM

## 2024-08-10 ENCOUNTER — TELEPHONE (OUTPATIENT)
Dept: RHEUMATOLOGY | Age: 59
End: 2024-08-10

## 2024-08-10 RX ORDER — TRAMADOL HYDROCHLORIDE 50 MG/1
50 TABLET ORAL 2 TIMES DAILY
Qty: 60 TABLET | Refills: 0 | OUTPATIENT
Start: 2024-08-10

## 2024-08-13 ENCOUNTER — NURSE TRIAGE (OUTPATIENT)
Dept: TELEHEALTH | Age: 59
End: 2024-08-13

## 2024-08-19 ENCOUNTER — APPOINTMENT (OUTPATIENT)
Dept: RHEUMATOLOGY | Age: 59
End: 2024-08-19

## 2024-08-19 VITALS
DIASTOLIC BLOOD PRESSURE: 90 MMHG | OXYGEN SATURATION: 97 % | SYSTOLIC BLOOD PRESSURE: 154 MMHG | TEMPERATURE: 98.3 F | HEIGHT: 66 IN | BODY MASS INDEX: 29.09 KG/M2 | WEIGHT: 181 LBS | HEART RATE: 78 BPM

## 2024-08-19 DIAGNOSIS — M17.0 PRIMARY OSTEOARTHRITIS OF BOTH KNEES: ICD-10-CM

## 2024-08-19 DIAGNOSIS — M19.012 PRIMARY OSTEOARTHRITIS OF BOTH SHOULDERS: ICD-10-CM

## 2024-08-19 DIAGNOSIS — M50.30 DDD (DEGENERATIVE DISC DISEASE), CERVICAL: ICD-10-CM

## 2024-08-19 DIAGNOSIS — G25.81 RESTLESS LEGS SYNDROME: ICD-10-CM

## 2024-08-19 DIAGNOSIS — M19.011 PRIMARY OSTEOARTHRITIS OF BOTH SHOULDERS: ICD-10-CM

## 2024-08-19 DIAGNOSIS — M48.061 SPINAL STENOSIS OF LUMBAR REGION WITHOUT NEUROGENIC CLAUDICATION: Primary | ICD-10-CM

## 2024-08-19 DIAGNOSIS — Z79.1 NSAID LONG-TERM USE: ICD-10-CM

## 2024-08-19 PROCEDURE — 99213 OFFICE O/P EST LOW 20 MIN: CPT | Performed by: INTERNAL MEDICINE

## 2024-08-19 PROCEDURE — 3077F SYST BP >= 140 MM HG: CPT | Performed by: INTERNAL MEDICINE

## 2024-08-19 RX ORDER — TRAMADOL HYDROCHLORIDE 50 MG/1
50 TABLET ORAL 2 TIMES DAILY
Qty: 60 TABLET | Refills: 0 | Status: SHIPPED | OUTPATIENT
Start: 2024-08-19

## 2024-08-19 RX ORDER — GABAPENTIN 300 MG/1
300 CAPSULE ORAL 2 TIMES DAILY
Qty: 60 CAPSULE | Refills: 2 | Status: SHIPPED | OUTPATIENT
Start: 2024-08-19

## 2024-08-19 RX ORDER — ROPINIROLE 2 MG/1
2 TABLET, FILM COATED ORAL NIGHTLY
Qty: 90 TABLET | Refills: 3 | Status: SHIPPED | OUTPATIENT
Start: 2024-08-19

## 2024-08-19 ASSESSMENT — PATIENT HEALTH QUESTIONNAIRE - PHQ9
SUM OF ALL RESPONSES TO PHQ9 QUESTIONS 1 AND 2: 1
1. LITTLE INTEREST OR PLEASURE IN DOING THINGS: NOT AT ALL
2. FEELING DOWN, DEPRESSED OR HOPELESS: SEVERAL DAYS
SUM OF ALL RESPONSES TO PHQ9 QUESTIONS 1 AND 2: 1
CLINICAL INTERPRETATION OF PHQ2 SCORE: NO FURTHER SCREENING NEEDED

## 2024-08-19 ASSESSMENT — PAIN SCALES - GENERAL: PAINLEVEL: 8

## 2024-08-22 ENCOUNTER — APPOINTMENT (OUTPATIENT)
Age: 59
End: 2024-08-22
Attending: INTERNAL MEDICINE

## 2024-09-10 ENCOUNTER — APPOINTMENT (OUTPATIENT)
Dept: SLEEP MEDICINE | Age: 59
End: 2024-09-10

## 2024-10-04 DIAGNOSIS — M48.061 SPINAL STENOSIS OF LUMBAR REGION, UNSPECIFIED WHETHER NEUROGENIC CLAUDICATION PRESENT: ICD-10-CM

## 2024-10-04 DIAGNOSIS — M54.50 BILATERAL LOW BACK PAIN WITHOUT SCIATICA, UNSPECIFIED CHRONICITY: ICD-10-CM

## 2024-10-04 DIAGNOSIS — M54.32 SCIATICA OF LEFT SIDE: ICD-10-CM

## 2024-10-04 RX ORDER — CYCLOBENZAPRINE HCL 10 MG
TABLET ORAL
Qty: 50 TABLET | Refills: 1 | Status: SHIPPED | OUTPATIENT
Start: 2024-10-04

## 2024-10-13 DIAGNOSIS — G47.33 OBSTRUCTIVE SLEEP APNEA: ICD-10-CM

## 2024-10-13 DIAGNOSIS — M50.30 DDD (DEGENERATIVE DISC DISEASE), CERVICAL: ICD-10-CM

## 2024-10-13 DIAGNOSIS — I10 BENIGN ESSENTIAL HYPERTENSION: ICD-10-CM

## 2024-10-13 DIAGNOSIS — M17.0 PRIMARY OSTEOARTHRITIS OF BOTH KNEES: ICD-10-CM

## 2024-10-13 RX ORDER — TAMSULOSIN HYDROCHLORIDE 0.4 MG/1
0.4 CAPSULE ORAL DAILY
Qty: 90 CAPSULE | Refills: 3 | Status: CANCELLED | OUTPATIENT
Start: 2024-10-13

## 2024-10-14 RX ORDER — TAMSULOSIN HYDROCHLORIDE 0.4 MG/1
CAPSULE ORAL
Qty: 90 CAPSULE | Refills: 3 | OUTPATIENT
Start: 2024-10-14

## 2024-10-14 RX ORDER — TRAMADOL HYDROCHLORIDE 50 MG/1
50 TABLET ORAL 2 TIMES DAILY
Qty: 60 TABLET | Refills: 0 | Status: SHIPPED | OUTPATIENT
Start: 2024-10-14

## 2024-10-14 RX ORDER — LOSARTAN POTASSIUM 50 MG/1
50 TABLET ORAL DAILY
Qty: 90 TABLET | Refills: 0 | Status: SHIPPED | OUTPATIENT
Start: 2024-10-14

## 2024-10-14 RX ORDER — MODAFINIL 200 MG/1
200 TABLET ORAL DAILY
Qty: 30 TABLET | Refills: 3 | Status: SHIPPED | OUTPATIENT
Start: 2024-10-14 | End: 2025-02-11

## 2024-10-14 RX ORDER — TAMSULOSIN HYDROCHLORIDE 0.4 MG/1
CAPSULE ORAL
Qty: 90 CAPSULE | Refills: 3 | Status: SHIPPED | OUTPATIENT
Start: 2024-10-14

## 2024-10-21 ENCOUNTER — APPOINTMENT (OUTPATIENT)
Dept: CT IMAGING | Facility: HOSPITAL | Age: 59
End: 2024-10-21
Attending: EMERGENCY MEDICINE
Payer: COMMERCIAL

## 2024-10-21 ENCOUNTER — HOSPITAL ENCOUNTER (EMERGENCY)
Facility: HOSPITAL | Age: 59
Discharge: HOME | End: 2024-10-21
Attending: EMERGENCY MEDICINE
Payer: COMMERCIAL

## 2024-10-21 ENCOUNTER — APPOINTMENT (OUTPATIENT)
Dept: CT IMAGING | Facility: HOSPITAL | Age: 59
End: 2024-10-21
Payer: COMMERCIAL

## 2024-10-21 ENCOUNTER — APPOINTMENT (OUTPATIENT)
Dept: GENERAL RADIOLOGY | Facility: HOSPITAL | Age: 59
End: 2024-10-21
Attending: EMERGENCY MEDICINE
Payer: COMMERCIAL

## 2024-10-21 VITALS
RESPIRATION RATE: 18 BRPM | OXYGEN SATURATION: 98 % | HEART RATE: 58 BPM | DIASTOLIC BLOOD PRESSURE: 84 MMHG | SYSTOLIC BLOOD PRESSURE: 138 MMHG | TEMPERATURE: 98 F

## 2024-10-21 DIAGNOSIS — S00.93XA CONTUSION OF HEAD, UNSPECIFIED PART OF HEAD, INITIAL ENCOUNTER: Primary | ICD-10-CM

## 2024-10-21 DIAGNOSIS — S40.012A CONTUSION OF LEFT SHOULDER, INITIAL ENCOUNTER: ICD-10-CM

## 2024-10-21 DIAGNOSIS — S16.1XXA STRAIN OF NECK MUSCLE, INITIAL ENCOUNTER: ICD-10-CM

## 2024-10-21 PROCEDURE — 99284 EMERGENCY DEPT VISIT MOD MDM: CPT

## 2024-10-21 PROCEDURE — 72125 CT NECK SPINE W/O DYE: CPT | Performed by: EMERGENCY MEDICINE

## 2024-10-21 PROCEDURE — 73030 X-RAY EXAM OF SHOULDER: CPT | Performed by: EMERGENCY MEDICINE

## 2024-10-21 PROCEDURE — 70450 CT HEAD/BRAIN W/O DYE: CPT

## 2024-10-21 RX ORDER — HYDROCODONE BITARTRATE AND ACETAMINOPHEN 5; 325 MG/1; MG/1
1-2 TABLET ORAL EVERY 6 HOURS PRN
Qty: 10 TABLET | Refills: 0 | Status: SHIPPED | OUTPATIENT
Start: 2024-10-21 | End: 2024-10-28

## 2024-10-21 RX ORDER — HYDROCODONE BITARTRATE AND ACETAMINOPHEN 5; 325 MG/1; MG/1
1 TABLET ORAL ONCE
Status: COMPLETED | OUTPATIENT
Start: 2024-10-21 | End: 2024-10-21

## 2024-10-21 NOTE — ED PROVIDER NOTES
Patient Seen in: [facility] Emergency Department      History     Chief Complaint   Patient presents with    Fall    Headache     Stated Complaint: fell ouot of bed yesterday and has a headache    Subjective:   HPI      Patient is a 59-year-old male who states yesterday morning he rolled out of bed fell onto his left side.  Patient struck his left side of his head as well as left shoulder when he fell.  Patient not on any blood thinners.  Patient states he did have headache up to 8 out of 10.  Patient also complains of neck pain.  Patient has some pain to his left shoulder as well.  Patient dates his blood pressure was elevated earlier.  Patient states he did have 1 episode of nausea vomiting earlier.  Patient states he has chronic back pain and does use a cane to walk.  Patient denies change in vision, no slurred speech.  No chest pain, shortness of breath, remainder of review of systems negative.    Objective:     Past Medical History:    Anxiety state    Asthma (HCC)    Back pain    Back problem    Cancer (HCC)    Depression    DVT (deep venous thrombosis) ([facility])    Fibromyalgia    Follicular lymphoma grade I (HCC)    Hearing impairment    Herniated cervical disc    C5    Herniated lumbar intervertebral disc    High blood pressure    History of blood clots    bilateral legs & r.arm    Kidney calculus    Leukemia (HCC)    Restless leg syndrome    Unspecified sleep apnea    Visual impairment              Past Surgical History:   Procedure Laterality Date    Cysto/uretero, stone remove Right 05/10/2024    stone extraction    Cystoscopy,fragmt ureteral stone Left 2019    URS, laser, stone removal, Dr. Landa    Lithotripsy      ESWL and URS in the past    Repair ing hernia,5+y/o,reducibl                  Social History     Socioeconomic History    Marital status:    Tobacco Use    Smoking status: Never    Smokeless tobacco: Never   Substance and Sexual Activity    Alcohol use: No    Drug use: Never      Social Drivers of Health     Food Insecurity: No Food Insecurity (5/10/2024)    Food Insecurity     Food Insecurity: Never true   Transportation Needs: No Transportation Needs (5/10/2024)    Transportation Needs     Lack of Transportation: No   Housing Stability: Low Risk  (5/10/2024)    Housing Stability     Housing Instability: No                  Physical Exam     ED Triage Vitals [10/21/24 1352]   BP (!) 177/97   Pulse 79   Resp 18   Temp 98.4 °F (36.9 °C)   Temp src Temporal   SpO2 97 %   O2 Device None (Room air)       Current Vitals:   Vital Signs  BP: 138/84  Pulse: 58  Resp: 18  Temp: 98.4 °F (36.9 °C)  Temp src: Temporal  MAP (mmHg): (!) 101    Oxygen Therapy  SpO2: 98 %  O2 Device: None (Room air)        Physical Exam   GENERAL: Patient resting comfortably on the cart in no acute distress.  HEENT: Extraocular muscles intact, pupils equal round reactive to light and accommodation.  Mouth normal, neck supple, no meningismus.  Mild tenderness left temporal region.  No crepitus or step-off.  Mild tenderness mid cervical region, no crepitus or step-off.  LUNGS: Lungs clear to auscultation bilaterally.  CARDIOVASCULAR: + S1-S2, regular rate and rhythm, no murmurs.  BACK: No CVA tenderness, no midline bony tenderness.  ABDOMEN: + Bowel sounds, soft, nontender, nondistended.  No rebound, no guarding, no hepatosplenomegaly.  EXTREMITIES: Mild tenderness lateral anterior and posterior left shoulder.  No obvious deformity.  Left elbow and wrist full range of motion, no tenderness, good capillary refill.  Sensation tact light touch all extremities.  SKIN: No rash, good turgor.  NEURO: Patient answers questions appropriately.  No focal deficits appreciated.  Conversant.  5-5 strength upper and lower extremities.      ED Course   Labs Reviewed - No data to display         CT brain No acute intracranial abnormality.  If there is clinical concern for acute ischemia/infarction, an MRI of the brain would be recommended  for further evaluation.   Independent reviewed by myself, no subdural        CT C-spine No acute abnormality in the cervical spine.            Left shoulder x-ray Mild osteoarthritic changes are present. No acute fracture or other acute bony process.            MDM      Patient was given Norco for pain.  Patient CT brain no acute abnormality.  Patient had CT C-spine no fracture.  Patient left shoulder x-ray no fracture.  Patient felt comfortable going home.  Recommend rest, ibuprofen for pain.  Ice to areas of pain.  Gradual increase in activity as long as asymptomatic.  Follow-up for further evaluation primary physician.  Call tomorrow for appointment.  Return if new or worse symptoms.  I did consider subdural, head contusion, concussion, cervical strain, cervical fracture, shoulder contusion, shoulder fracture        Medical Decision Making      Disposition and Plan     Clinical Impression:  1. Contusion of head, unspecified part of head, initial encounter    2. Strain of neck muscle, initial encounter    3. Contusion of left shoulder, initial encounter         Disposition:  Discharge  10/21/2024  7:38 pm    Follow-up:  Bernheim, Bruce, MD  [facility] [address]  [phone]    Follow up in 2 day(s)            Medications Prescribed:  Current Discharge Medication List        START taking these medications    Details   HYDROcodone-acetaminophen 5-325 MG Oral Tab Take 1-2 tablets by mouth every 6 (six) hours as needed.  Qty: 10 tablet, Refills: 0    Associated Diagnoses: Strain of neck muscle, initial encounter; Contusion of left shoulder, initial encounter                 Supplementary Documentation:

## 2024-10-21 NOTE — ED INITIAL ASSESSMENT (HPI)
Pt arrives in wheelchair, states he rolled out of bed yesterday. Hit head on dresser. Denies thinners, having headaches and vomiting. A&O x4

## 2024-10-22 ENCOUNTER — CLINICAL DOCUMENTATION (OUTPATIENT)
Dept: GERIATRIC MEDICINE | Age: 59
End: 2024-10-22

## 2024-10-22 DIAGNOSIS — M54.2 NECK PAIN: ICD-10-CM
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
## 2024-10-22 DIAGNOSIS — M25.512 PAIN IN JOINT OF LEFT SHOULDER: ICD-10-CM

## 2024-11-08 RX ORDER — TAMSULOSIN HYDROCHLORIDE 0.4 MG/1
CAPSULE ORAL
Qty: 30 CAPSULE | OUTPATIENT
Start: 2024-11-08

## 2024-11-10 DIAGNOSIS — M50.30 DDD (DEGENERATIVE DISC DISEASE), CERVICAL: ICD-10-CM

## 2024-11-10 DIAGNOSIS — M17.0 PRIMARY OSTEOARTHRITIS OF BOTH KNEES: ICD-10-CM

## 2024-11-11 RX ORDER — TAMSULOSIN HYDROCHLORIDE 0.4 MG/1
CAPSULE ORAL
Qty: 90 CAPSULE | Refills: 3 | Status: SHIPPED | OUTPATIENT
Start: 2024-11-11

## 2024-11-21 RX ORDER — TRAMADOL HYDROCHLORIDE 50 MG/1
50 TABLET ORAL 2 TIMES DAILY
Qty: 60 TABLET | Refills: 0 | Status: SHIPPED | OUTPATIENT
Start: 2024-11-21

## 2024-11-22 ENCOUNTER — LAB SERVICES (OUTPATIENT)
Dept: LAB | Age: 59
End: 2024-11-22

## 2024-11-22 ENCOUNTER — OFFICE VISIT (OUTPATIENT)
Dept: HEMATOLOGY/ONCOLOGY | Age: 59
End: 2024-11-22
Attending: INTERNAL MEDICINE

## 2024-11-22 VITALS
DIASTOLIC BLOOD PRESSURE: 96 MMHG | WEIGHT: 166.01 LBS | HEART RATE: 85 BPM | OXYGEN SATURATION: 96 % | BODY MASS INDEX: 26.79 KG/M2 | SYSTOLIC BLOOD PRESSURE: 158 MMHG | TEMPERATURE: 98 F

## 2024-11-22 DIAGNOSIS — C82.03 FOLLICULAR LYMPHOMA GRADE I OF INTRA-ABDOMINAL LYMPH NODES  (CMD): ICD-10-CM

## 2024-11-22 DIAGNOSIS — Z85.6 HISTORY OF ACUTE MYELOID LEUKEMIA: ICD-10-CM

## 2024-11-22 DIAGNOSIS — R53.83 OTHER FATIGUE: ICD-10-CM

## 2024-11-22 DIAGNOSIS — C82.03 FOLLICULAR LYMPHOMA GRADE I OF INTRA-ABDOMINAL LYMPH NODES  (CMD): Primary | ICD-10-CM

## 2024-11-22 DIAGNOSIS — R63.4 WEIGHT LOSS: ICD-10-CM

## 2024-11-22 LAB
ALBUMIN SERPL-MCNC: 4 G/DL (ref 3.4–5)
ALBUMIN/GLOB SERPL: 1 {RATIO} (ref 1–2.4)
ALP SERPL-CCNC: 103 UNITS/L (ref 45–117)
ALT SERPL-CCNC: 29 UNITS/L
ANION GAP SERPL CALC-SCNC: 8 MMOL/L (ref 7–19)
AST SERPL-CCNC: 21 UNITS/L
BASOPHILS # BLD: 0.1 K/MCL (ref 0–0.3)
BASOPHILS NFR BLD: 1 %
BILIRUB SERPL-MCNC: 1.1 MG/DL (ref 0.2–1)
BUN SERPL-MCNC: 13 MG/DL (ref 6–20)
BUN/CREAT SERPL: 15 (ref 7–25)
CALCIUM SERPL-MCNC: 9.6 MG/DL (ref 8.4–10.2)
CHLORIDE SERPL-SCNC: 107 MMOL/L (ref 97–110)
CO2 SERPL-SCNC: 26 MMOL/L (ref 21–32)
CREAT SERPL-MCNC: 0.89 MG/DL (ref 0.67–1.17)
DEPRECATED RDW RBC: 41.5 FL (ref 39–50)
EGFRCR SERPLBLD CKD-EPI 2021: >90 ML/MIN/{1.73_M2}
EOSINOPHIL # BLD: 0.4 K/MCL (ref 0–0.5)
EOSINOPHIL NFR BLD: 4 %
ERYTHROCYTE [DISTWIDTH] IN BLOOD: 12.8 % (ref 11–15)
FASTING DURATION TIME PATIENT: ABNORMAL H
GLOBULIN SER-MCNC: 3.9 G/DL (ref 2–4)
GLUCOSE SERPL-MCNC: 84 MG/DL (ref 70–99)
HCT VFR BLD CALC: 47.6 % (ref 39–51)
HGB BLD-MCNC: 15.5 G/DL (ref 13–17)
IMM GRANULOCYTES # BLD AUTO: 0 K/MCL (ref 0–0.2)
IMM GRANULOCYTES # BLD: 0 %
LDH SERPL L TO P-CCNC: 193 UNITS/L (ref 86–234)
LYMPHOCYTES # BLD: 2.4 K/MCL (ref 1–4)
LYMPHOCYTES NFR BLD: 29 %
MCH RBC QN AUTO: 28.7 PG (ref 26–34)
MCHC RBC AUTO-ENTMCNC: 32.6 G/DL (ref 32–36.5)
MCV RBC AUTO: 88.1 FL (ref 78–100)
MONOCYTES # BLD: 0.4 K/MCL (ref 0.3–0.9)
MONOCYTES NFR BLD: 5 %
NEUTROPHILS # BLD: 4.9 K/MCL (ref 1.8–7.7)
NEUTROPHILS NFR BLD: 61 %
NRBC BLD MANUAL-RTO: 0 /100 WBC
PLATELET # BLD AUTO: 196 K/MCL (ref 140–450)
POTASSIUM SERPL-SCNC: 4.2 MMOL/L (ref 3.4–5.1)
PROT SERPL-MCNC: 7.9 G/DL (ref 6.4–8.2)
RBC # BLD: 5.4 MIL/MCL (ref 4.5–5.9)
SODIUM SERPL-SCNC: 137 MMOL/L (ref 135–145)
TSH SERPL-ACNC: 0.45 MCUNITS/ML (ref 0.35–5)
WBC # BLD: 8.2 K/MCL (ref 4.2–11)

## 2024-11-22 PROCEDURE — 83615 LACTATE (LD) (LDH) ENZYME: CPT | Performed by: CLINICAL MEDICAL LABORATORY

## 2024-11-22 PROCEDURE — 3080F DIAST BP >= 90 MM HG: CPT | Performed by: INTERNAL MEDICINE

## 2024-11-22 PROCEDURE — 36415 COLL VENOUS BLD VENIPUNCTURE: CPT | Performed by: INTERNAL MEDICINE

## 2024-11-22 PROCEDURE — 99214 OFFICE O/P EST MOD 30 MIN: CPT | Performed by: INTERNAL MEDICINE

## 2024-11-22 PROCEDURE — 99211 OFF/OP EST MAY X REQ PHY/QHP: CPT

## 2024-11-22 PROCEDURE — 3077F SYST BP >= 140 MM HG: CPT | Performed by: INTERNAL MEDICINE

## 2024-11-22 PROCEDURE — 80050 GENERAL HEALTH PANEL: CPT | Performed by: CLINICAL MEDICAL LABORATORY

## 2024-11-22 ASSESSMENT — PAIN SCALES - GENERAL: PAINLEVEL: 8

## 2024-12-06 ENCOUNTER — HOSPITAL ENCOUNTER (OUTPATIENT)
Dept: NUCLEAR MEDICINE | Facility: HOSPITAL | Age: 59
Discharge: HOME | End: 2024-12-06
Payer: COMMERCIAL

## 2024-12-06 DIAGNOSIS — C82.03 FOLLICULAR LYMPHOMA GRADE I OF INTRA-ABDOMINAL LYMPH NODES (HCC): ICD-10-CM

## 2024-12-06 LAB
GLUCOSE BLD-MCNC: 105 MG/DL (ref 70–99)
REF LAB TEST NAME: NORMAL
SERVICE CMNT-IMP: NORMAL

## 2024-12-06 PROCEDURE — 78815 PET IMAGE W/CT SKULL-THIGH: CPT

## 2024-12-06 PROCEDURE — 82962 GLUCOSE BLOOD TEST: CPT

## 2024-12-09 ENCOUNTER — APPOINTMENT (OUTPATIENT)
Dept: MRI IMAGING | Facility: HOSPITAL | Age: 59
End: 2024-12-09
Attending: EMERGENCY MEDICINE
Payer: COMMERCIAL

## 2024-12-09 ENCOUNTER — APPOINTMENT (OUTPATIENT)
Dept: CT IMAGING | Facility: HOSPITAL | Age: 59
End: 2024-12-09
Attending: EMERGENCY MEDICINE
Payer: COMMERCIAL

## 2024-12-09 ENCOUNTER — APPOINTMENT (OUTPATIENT)
Dept: GENERAL RADIOLOGY | Facility: HOSPITAL | Age: 59
End: 2024-12-09
Attending: EMERGENCY MEDICINE
Payer: COMMERCIAL

## 2024-12-09 ENCOUNTER — HOSPITAL ENCOUNTER (OUTPATIENT)
Age: 59
Discharge: ANOTHER HOSPITAL | End: 2024-12-09
Attending: EMERGENCY MEDICINE
Payer: COMMERCIAL

## 2024-12-09 ENCOUNTER — HOSPITAL ENCOUNTER (OUTPATIENT)
Facility: HOSPITAL | Age: 59
Setting detail: OBSERVATION
Discharge: HOME | End: 2024-12-11
Attending: EMERGENCY MEDICINE | Admitting: EMERGENCY MEDICINE
Payer: COMMERCIAL

## 2024-12-09 ENCOUNTER — TELEPHONE (OUTPATIENT)
Dept: HEMATOLOGY/ONCOLOGY | Age: 59
End: 2024-12-09

## 2024-12-09 VITALS
TEMPERATURE: 98 F | WEIGHT: 165 LBS | BODY MASS INDEX: 27 KG/M2 | RESPIRATION RATE: 26 BRPM | OXYGEN SATURATION: 99 % | HEART RATE: 81 BPM | SYSTOLIC BLOOD PRESSURE: 154 MMHG | DIASTOLIC BLOOD PRESSURE: 87 MMHG

## 2024-12-09 DIAGNOSIS — G44.89 OTHER HEADACHE SYNDROME: ICD-10-CM

## 2024-12-09 DIAGNOSIS — R50.9 FEVER, UNSPECIFIED FEVER CAUSE: ICD-10-CM

## 2024-12-09 DIAGNOSIS — R41.82 ALTERED MENTAL STATUS, UNSPECIFIED ALTERED MENTAL STATUS TYPE: ICD-10-CM

## 2024-12-09 DIAGNOSIS — R41.82 ALTERED MENTAL STATUS, UNSPECIFIED ALTERED MENTAL STATUS TYPE: Primary | ICD-10-CM

## 2024-12-09 DIAGNOSIS — R55 SYNCOPE AND COLLAPSE: ICD-10-CM

## 2024-12-09 DIAGNOSIS — J40 BRONCHITIS: Primary | ICD-10-CM

## 2024-12-09 PROBLEM — R73.9 HYPERGLYCEMIA: Status: ACTIVE | Noted: 2024-12-09

## 2024-12-09 LAB
ADENOVIRUS PCR:: NOT DETECTED
ALBUMIN SERPL-MCNC: 4.5 G/DL (ref 3.2–4.8)
ALBUMIN/GLOB SERPL: 1.3 {RATIO} (ref 1–2)
ALP LIVER SERPL-CCNC: 113 U/L
ALT SERPL-CCNC: 17 U/L
ANION GAP SERPL CALC-SCNC: 10 MMOL/L (ref 0–18)
AST SERPL-CCNC: 19 U/L (ref ?–34)
B PARAPERT DNA SPEC QL NAA+PROBE: NOT DETECTED
B PERT DNA SPEC QL NAA+PROBE: NOT DETECTED
BASOPHILS # BLD AUTO: 0.06 X10(3) UL (ref 0–0.2)
BASOPHILS NFR BLD AUTO: 0.6 %
BILIRUB SERPL-MCNC: 0.7 MG/DL (ref 0.3–1.2)
BILIRUB UR QL STRIP.AUTO: NEGATIVE
BUN BLD-MCNC: 6 MG/DL (ref 9–23)
C PNEUM DNA SPEC QL NAA+PROBE: NOT DETECTED
CALCIUM BLD-MCNC: 10 MG/DL (ref 8.7–10.4)
CHLORIDE SERPL-SCNC: 108 MMOL/L (ref 98–112)
CLARITY CSF: CLEAR
CLARITY UR REFRACT.AUTO: CLEAR
CO2 SERPL-SCNC: 23 MMOL/L (ref 21–32)
COLOR CSF: COLORLESS
COLOR UR AUTO: COLORLESS
CORONAVIRUS 229E PCR:: NOT DETECTED
CORONAVIRUS HKU1 PCR:: NOT DETECTED
CORONAVIRUS NL63 PCR:: NOT DETECTED
CORONAVIRUS OC43 PCR:: NOT DETECTED
COUNT PERFORMED ON TUBE: 3
CREAT BLD-MCNC: 0.95 MG/DL
EGFRCR SERPLBLD CKD-EPI 2021: 92 ML/MIN/1.73M2 (ref 60–?)
EOSINOPHIL # BLD AUTO: 0.31 X10(3) UL (ref 0–0.7)
EOSINOPHIL NFR BLD AUTO: 3 %
ERYTHROCYTE [DISTWIDTH] IN BLOOD BY AUTOMATED COUNT: 12.9 %
FLUAV RNA SPEC QL NAA+PROBE: NOT DETECTED
FLUBV RNA SPEC QL NAA+PROBE: NOT DETECTED
GLOBULIN PLAS-MCNC: 3.5 G/DL (ref 2–3.5)
GLUCOSE BLD-MCNC: 103 MG/DL (ref 70–99)
GLUCOSE BLD-MCNC: 108 MG/DL (ref 70–99)
GLUCOSE CSF-MCNC: 62 MG/DL (ref 40–70)
GLUCOSE UR STRIP.AUTO-MCNC: NORMAL MG/DL
HCT VFR BLD AUTO: 43.7 %
HGB BLD-MCNC: 15.2 G/DL
IMM GRANULOCYTES # BLD AUTO: 0.03 X10(3) UL (ref 0–1)
IMM GRANULOCYTES NFR BLD: 0.3 %
KETONES UR STRIP.AUTO-MCNC: NEGATIVE MG/DL
LEUKOCYTE ESTERASE UR QL STRIP.AUTO: NEGATIVE
LYMPHOCYTES # BLD AUTO: 1.91 X10(3) UL (ref 1–4)
LYMPHOCYTES NFR BLD AUTO: 18.4 %
MCH RBC QN AUTO: 29.9 PG (ref 26–34)
MCHC RBC AUTO-ENTMCNC: 34.8 G/DL (ref 31–37)
MCV RBC AUTO: 86 FL
METAPNEUMOVIRUS PCR:: NOT DETECTED
MONOCYTES # BLD AUTO: 0.65 X10(3) UL (ref 0.1–1)
MONOCYTES NFR BLD AUTO: 6.3 %
MYCOPLASMA PNEUMONIA PCR:: NOT DETECTED
NEUTROPHILS # BLD AUTO: 7.43 X10 (3) UL (ref 1.5–7.7)
NEUTROPHILS # BLD AUTO: 7.43 X10(3) UL (ref 1.5–7.7)
NEUTROPHILS NFR BLD AUTO: 71.4 %
NITRITE UR QL STRIP.AUTO: NEGATIVE
OSMOLALITY SERPL CALC.SUM OF ELEC: 290 MOSM/KG (ref 275–295)
PARAINFLUENZA 1 PCR:: NOT DETECTED
PARAINFLUENZA 2 PCR:: NOT DETECTED
PARAINFLUENZA 3 PCR:: NOT DETECTED
PARAINFLUENZA 4 PCR:: NOT DETECTED
PH UR STRIP.AUTO: 7 [PH] (ref 5–8)
PLATELET # BLD AUTO: 168 10(3)UL (ref 150–450)
POTASSIUM SERPL-SCNC: 3.2 MMOL/L (ref 3.5–5.1)
PROT PATTERN CSF ELPH-IMP: 34.1 MG/DL (ref 15–45)
PROT SERPL-MCNC: 8 G/DL (ref 5.7–8.2)
PROT UR STRIP.AUTO-MCNC: NEGATIVE MG/DL
RBC # BLD AUTO: 5.08 X10(6)UL
RBC # CSF: 1 /MM3 (ref ?–1)
RBC UR QL AUTO: NEGATIVE
RHINOVIRUS/ENTERO PCR:: NOT DETECTED
RSV RNA SPEC QL NAA+PROBE: NOT DETECTED
SARS-COV-2 RNA NPH QL NAA+NON-PROBE: NOT DETECTED
SARS-COV-2 RNA RESP QL NAA+PROBE: NOT DETECTED
SODIUM SERPL-SCNC: 141 MMOL/L (ref 136–145)
SP GR UR STRIP.AUTO: 1.03 (ref 1–1.03)
TOTAL CELLS COUNTED CSF: 2 /MM3 (ref 0–5)
TOTAL VOLUME CSF: 11.5 ML
UROBILINOGEN UR STRIP.AUTO-MCNC: NORMAL MG/DL
WBC # BLD AUTO: 10.4 X10(3) UL (ref 4–11)

## 2024-12-09 PROCEDURE — 99223 1ST HOSP IP/OBS HIGH 75: CPT | Performed by: HOSPITALIST

## 2024-12-09 PROCEDURE — 71275 CT ANGIOGRAPHY CHEST: CPT | Performed by: EMERGENCY MEDICINE

## 2024-12-09 PROCEDURE — 70450 CT HEAD/BRAIN W/O DYE: CPT | Performed by: EMERGENCY MEDICINE

## 2024-12-09 PROCEDURE — 62328 DX LMBR SPI PNXR W/FLUOR/CT: CPT | Performed by: EMERGENCY MEDICINE

## 2024-12-09 PROCEDURE — 0042T CT STROKE (DAWN) CTA BRAIN/CTA NECK+PERF(CPT=70496/70498/0042T): CPT | Performed by: EMERGENCY MEDICINE

## 2024-12-09 PROCEDURE — 99213 OFFICE O/P EST LOW 20 MIN: CPT

## 2024-12-09 PROCEDURE — 74177 CT ABD & PELVIS W/CONTRAST: CPT | Performed by: EMERGENCY MEDICINE

## 2024-12-09 PROCEDURE — 70498 CT ANGIOGRAPHY NECK: CPT | Performed by: EMERGENCY MEDICINE

## 2024-12-09 PROCEDURE — 70496 CT ANGIOGRAPHY HEAD: CPT | Performed by: EMERGENCY MEDICINE

## 2024-12-09 RX ORDER — POTASSIUM CHLORIDE 14.9 MG/ML
20 INJECTION INTRAVENOUS ONCE
Status: COMPLETED | OUTPATIENT
Start: 2024-12-09 | End: 2024-12-09

## 2024-12-09 RX ORDER — POLYETHYLENE GLYCOL 3350 17 G/17G
17 POWDER, FOR SOLUTION ORAL DAILY PRN
Status: DISCONTINUED | OUTPATIENT
Start: 2024-12-09 | End: 2024-12-11

## 2024-12-09 RX ORDER — ALBUTEROL SULFATE 90 UG/1
2 INHALANT RESPIRATORY (INHALATION) EVERY 4 HOURS PRN
Status: DISCONTINUED | OUTPATIENT
Start: 2024-12-09 | End: 2024-12-11

## 2024-12-09 RX ORDER — GUAIFENESIN 600 MG/1
600 TABLET, EXTENDED RELEASE ORAL 2 TIMES DAILY
Status: DISCONTINUED | OUTPATIENT
Start: 2024-12-09 | End: 2024-12-11

## 2024-12-09 RX ORDER — VANCOMYCIN 2 GRAM/500 ML IN 0.9 % SODIUM CHLORIDE INTRAVENOUS
25 ONCE
Status: COMPLETED | OUTPATIENT
Start: 2024-12-09 | End: 2024-12-09

## 2024-12-09 RX ORDER — HYDROMORPHONE HYDROCHLORIDE 1 MG/ML
1 INJECTION, SOLUTION INTRAMUSCULAR; INTRAVENOUS; SUBCUTANEOUS ONCE
Status: COMPLETED | OUTPATIENT
Start: 2024-12-09 | End: 2024-12-09

## 2024-12-09 RX ORDER — GABAPENTIN 300 MG/1
300 CAPSULE ORAL 2 TIMES DAILY
COMMUNITY
Start: 2024-11-08

## 2024-12-09 RX ORDER — ACETAMINOPHEN 500 MG
500 TABLET ORAL EVERY 4 HOURS PRN
Status: DISCONTINUED | OUTPATIENT
Start: 2024-12-09 | End: 2024-12-11

## 2024-12-09 RX ORDER — PREDNISONE 20 MG/1
40 TABLET ORAL ONCE
Status: COMPLETED | OUTPATIENT
Start: 2024-12-09 | End: 2024-12-09

## 2024-12-09 RX ORDER — MODAFINIL 100 MG/1
200 TABLET ORAL DAILY
Status: DISCONTINUED | OUTPATIENT
Start: 2024-12-10 | End: 2024-12-11

## 2024-12-09 RX ORDER — ROPINIROLE 2 MG/1
2 TABLET, FILM COATED ORAL NIGHTLY
Status: DISCONTINUED | OUTPATIENT
Start: 2024-12-09 | End: 2024-12-11

## 2024-12-09 RX ORDER — SODIUM PHOSPHATE, DIBASIC AND SODIUM PHOSPHATE, MONOBASIC 7; 19 G/230ML; G/230ML
1 ENEMA RECTAL ONCE AS NEEDED
Status: DISCONTINUED | OUTPATIENT
Start: 2024-12-09 | End: 2024-12-11

## 2024-12-09 RX ORDER — ONDANSETRON 2 MG/ML
4 INJECTION INTRAMUSCULAR; INTRAVENOUS ONCE
Status: COMPLETED | OUTPATIENT
Start: 2024-12-09 | End: 2024-12-09

## 2024-12-09 RX ORDER — PROCHLORPERAZINE EDISYLATE 5 MG/ML
5 INJECTION INTRAMUSCULAR; INTRAVENOUS EVERY 8 HOURS PRN
Status: DISCONTINUED | OUTPATIENT
Start: 2024-12-09 | End: 2024-12-11

## 2024-12-09 RX ORDER — TAMSULOSIN HYDROCHLORIDE 0.4 MG/1
0.4 CAPSULE ORAL DAILY
Status: DISCONTINUED | OUTPATIENT
Start: 2024-12-10 | End: 2024-12-11

## 2024-12-09 RX ORDER — LOSARTAN POTASSIUM 50 MG/1
50 TABLET ORAL DAILY
Status: DISCONTINUED | OUTPATIENT
Start: 2024-12-10 | End: 2024-12-11

## 2024-12-09 RX ORDER — SODIUM CHLORIDE 9 MG/ML
INJECTION, SOLUTION INTRAVENOUS CONTINUOUS
Status: ACTIVE | OUTPATIENT
Start: 2024-12-09 | End: 2024-12-10

## 2024-12-09 RX ORDER — ROPINIROLE 2 MG/1
2 TABLET, FILM COATED ORAL NIGHTLY
COMMUNITY
Start: 2024-12-02

## 2024-12-09 RX ORDER — ONDANSETRON 2 MG/ML
4 INJECTION INTRAMUSCULAR; INTRAVENOUS EVERY 6 HOURS PRN
Status: DISCONTINUED | OUTPATIENT
Start: 2024-12-09 | End: 2024-12-11

## 2024-12-09 RX ORDER — BISACODYL 10 MG
10 SUPPOSITORY, RECTAL RECTAL
Status: DISCONTINUED | OUTPATIENT
Start: 2024-12-09 | End: 2024-12-11

## 2024-12-09 RX ORDER — DIPHENHYDRAMINE HYDROCHLORIDE 50 MG/ML
25 INJECTION INTRAMUSCULAR; INTRAVENOUS ONCE
Status: COMPLETED | OUTPATIENT
Start: 2024-12-09 | End: 2024-12-09

## 2024-12-09 RX ORDER — TRAMADOL HYDROCHLORIDE 50 MG/1
50 TABLET ORAL EVERY 12 HOURS PRN
Status: DISCONTINUED | OUTPATIENT
Start: 2024-12-09 | End: 2024-12-11

## 2024-12-09 RX ORDER — PREDNISONE 20 MG/1
40 TABLET ORAL
Status: DISCONTINUED | OUTPATIENT
Start: 2024-12-10 | End: 2024-12-11

## 2024-12-09 RX ORDER — LORAZEPAM 2 MG/ML
2 INJECTION INTRAMUSCULAR ONCE
Status: DISCONTINUED | OUTPATIENT
Start: 2024-12-09 | End: 2024-12-11

## 2024-12-09 RX ORDER — IPRATROPIUM BROMIDE AND ALBUTEROL SULFATE 2.5; .5 MG/3ML; MG/3ML
3 SOLUTION RESPIRATORY (INHALATION)
Status: DISCONTINUED | OUTPATIENT
Start: 2024-12-09 | End: 2024-12-10

## 2024-12-09 RX ORDER — GABAPENTIN 300 MG/1
300 CAPSULE ORAL NIGHTLY
Status: DISCONTINUED | OUTPATIENT
Start: 2024-12-09 | End: 2024-12-11

## 2024-12-09 RX ORDER — LORAZEPAM 2 MG/ML
1 INJECTION INTRAMUSCULAR ONCE
Status: COMPLETED | OUTPATIENT
Start: 2024-12-09 | End: 2024-12-09

## 2024-12-09 RX ORDER — KETOROLAC TROMETHAMINE 15 MG/ML
15 INJECTION, SOLUTION INTRAMUSCULAR; INTRAVENOUS ONCE
Status: COMPLETED | OUTPATIENT
Start: 2024-12-09 | End: 2024-12-09

## 2024-12-09 RX ORDER — SENNOSIDES 8.6 MG
17.2 TABLET ORAL NIGHTLY PRN
Status: DISCONTINUED | OUTPATIENT
Start: 2024-12-09 | End: 2024-12-11

## 2024-12-09 NOTE — ED PROVIDER NOTES
Patient Seen in: [facility]      History     Chief Complaint   Patient presents with    Fall     Stated Complaint: Inj After Fall / Asthma    Subjective:   HPI      60 yo male has had URI symptoms for a few weeks. Has had persistent cough. This morning was awake early doing a neb treatment in the bathroom and the wife heard a loud noise and found him on the floor. She was able to get him up. He was conscious when she found him. He is giving limited history and is somewhat slow to respond to me in IC. Daughter at bedside and wife on the phone say that his mental status was normal yesterday  but that today he is definitely not himself. Wife also notes that he has had multiple unexplained falls over the last year.     Objective:     No pertinent past medical history.            No pertinent past surgical history.              No pertinent social history.            Review of Systems    Positive for stated complaint: Inj After Fall / Asthma  Other systems are as noted in HPI.  Constitutional and vital signs reviewed.      All other systems reviewed and negative except as noted above.    Physical Exam     ED Triage Vitals [12/09/24 1037]   /87   Pulse 81   Resp 26   Temp 97.8 °F (36.6 °C)   Temp src Oral   SpO2 99 %   O2 Device None (Room air)       Current Vitals:   Vital Signs  BP: 154/87  Pulse: 81  Resp: 26  Temp: 97.8 °F (36.6 °C)  Temp src: Oral    Oxygen Therapy  SpO2: 99 %  O2 Device: None (Room air)        Physical Exam  Vitals and nursing note reviewed.   Constitutional:       Appearance: He is well-developed.      Comments: Awake, cooperative but slow to answer questions and slow to respond   HENT:      Head: Normocephalic and atraumatic.   Cardiovascular:      Rate and Rhythm: Normal rate and regular rhythm.   Pulmonary:      Effort: Pulmonary effort is normal. No respiratory distress.      Comments: Scattered wheeze.   Musculoskeletal:      Comments: No midline spinal tenderness.  There is left rhomboid tenderness.    Skin:     General: Skin is warm and dry.      Capillary Refill: Capillary refill takes less than 2 seconds.   Neurological:      Comments: Left side weakness, chronic per family   Psychiatric:         Mood and Affect: Mood normal.         Behavior: Behavior normal.            ED Course   Labs Reviewed - No data to display                MDM         Medical Decision Making  To ER for higher level of care. Offered EMS but both wife and daughter refuse. Daughter is comfortable driving him. She will take him straight to ER at [facility].   Disposition and Plan     Clinical Impression:  1. Bronchitis    2. Altered mental status, unspecified altered mental status type    3. Syncope and collapse         Disposition:  Ic to ed  12/9/2024 11:01 am    Follow-up:  No follow-up provider specified.        Medications Prescribed:  Current Discharge Medication List              Supplementary Documentation:

## 2024-12-09 NOTE — ED QUICK NOTES
Orders for admission, patient is aware of plan and ready to go upstairs. Any questions, please call ED RN Nia at extension [phone].     Patient Covid vaccination status: Fully vaccinated     COVID Test Ordered in ED: $$$$Respiratory Flu Expanded Panel + Covid-19$$$$    COVID Suspicion at Admission: N/A    Running Infusions:  Vancomycin    Mental Status/LOC at time of transport: A&Ox4    Other pertinent information:   CIWA score: N/A   NIH score:       Fluids complete

## 2024-12-09 NOTE — PROCEDURES
[facility]   part of [facility]  Procedure Note    Alonzo Quevedo Patient Status:  Emergency    3/21/1965 MRN [id number]   Location [facility] EMERGENCY DEPARTMENT Attending Kim Nguyen MD   Hosp Day # 0 PCP Bruce Bernheim, MD     Procedure: Lumbar puncture    Pre-Procedure Diagnosis:  Meningitis    Post-Procedure Diagnosis: Same    Anesthesia:  Local    Findings:  Successful LP at L3-4. Return of clear CSF. Collection of 12cc total for labs as ordered.    Specimens: As above    Blood Loss:  0    Tourniquet Time: 0  Complications:  None  Drains:  None    Secondary Diagnosis:  None    Yarelis Huerta MD  2024

## 2024-12-09 NOTE — SIGNIFICANT EVENT
Stroke Alert initiated ED  Last Know Normal at 2230  Pre-morbid MRS 1  Initial NIHSS 6 including left sided weakness, decreased sensation on the left, lethargic   Patient accompanied to CT dept  Repeat NIHSS completed back in ED room    NIH Stroke Scale  1a.  Level of consciousness: 1   1b. LOC questions:  0   1c. LOC commands: 0   2.  Best Gaze: 0   3. Visual: 0   4. Facial Palsy: 1 left sided   5a. Motor left arm: 2   5b.  Motor right arm: 0   6a. Motor left le   6b.  Motor right le   7. Limb Ataxia: 0   8.  Sensory: 1 left sided   9. Best Language:  0   10. Dysarthria: 1   11. Extinction and Inattention: 0     Total:   6      Other symptoms include coughing, SOB      Per Dr Galvez, patient is not a candidate for TNK, exclusions include out of window  Per Dr Irby, patient is not a candidate for neuro intervention, exclusions include no LVO  Case discussed with Dr Nguyen, ED      Stroke Alert timing affected by: Unclear story and LKW    Please refer to the Stroke Data Flowsheets for additional information and Stroke Alert response times.

## 2024-12-09 NOTE — ED INITIAL ASSESSMENT (HPI)
59YM c/c of r/o stroke Pt wife reports that pt was doing a breathing treatment and wife report heard him fall Pt wife report that at the time he was reporting that he was having L sided numbness. Pt denies these symptoms at this time Pt is complaining of sob upon arrival  Pt having increased weakness upon arrival

## 2024-12-09 NOTE — ED PROVIDER NOTES
Patient Seen in: [facility]      History     Chief Complaint   Patient presents with    Fall     Stated Complaint: fall, pain    Subjective:   HPI      Last normal 10:30 pm- jfell down at 5:30 am- \"not speaking or breathing right\" per wife- Hx NHL, fibromyalgia and chronic back pain-this morning his wife heard a crash in the room and he had fallen to the ground and he was on the ground it was at 5:30 AM and she noticed that his speech was slurred and that he had facial droop they FaceTime to their daughter who is a nurse and the daughter who is a nurse noticed the facial droop and suggested the patient may have had a stroke.  There was also some concern because he has had a cough and congestion he has been sick for over a week with back pain and headache and neck pain as well.  Patient reports fevers over the past week.  However upon his arrival he really was not a reliable historian his eyes were closed and he was occasionally shivering.          Objective:     Past Medical History:    Anxiety state    Asthma (HCC)    Back pain    Back problem    Cancer (HCC)    Depression    DVT (deep venous thrombosis) (HCC)    Fibromyalgia    Follicular lymphoma grade I (HCC)    Hearing impairment    Herniated cervical disc    C5    Herniated lumbar intervertebral disc    High blood pressure    History of blood clots    bilateral legs & r.arm    Kidney calculus    Leukemia (HCC)    Restless leg syndrome    Unspecified sleep apnea    Visual impairment              Past Surgical History:   Procedure Laterality Date    Cysto/uretero, stone remove Right 05/10/2024    stone extraction    Cystoscopy,fragmt ureteral stone Left 2019    URS, laser, stone removal, Dr. Landa    Lithotripsy      ESWL and URS in the past    Repair ing hernia,5+y/o,reducibl                  No pertinent social history.                Physical Exam     ED Triage Vitals [12/09/24 1130]   BP (!) 178/91   Pulse 90   Resp 20   Temp  99.3 °F (37.4 °C)   Temp src Temporal   SpO2 99 %   O2 Device None (Room air)       Current Vitals:   Vital Signs  BP: 136/84  Pulse: 72  Resp: 12  Temp: 99.3 °F (37.4 °C)  Temp src: Temporal  MAP (mmHg): 100    Oxygen Therapy  SpO2: 100 %  O2 Device: None (Room air)        Physical Exam  Vitals and nursing note reviewed. Exam conducted with a chaperone present (Wife and daughter at bedside.).   Constitutional:       General: He is not in acute distress.     Appearance: He is well-developed. He is not diaphoretic.   HENT:      Head: Atraumatic.      Right Ear: External ear normal.      Left Ear: External ear normal.      Nose: Nose normal.   Eyes:      General: No scleral icterus.        Right eye: No discharge.         Left eye: No discharge.      Conjunctiva/sclera: Conjunctivae normal.      Pupils: Pupils are equal, round, and reactive to light.   Neck:      Vascular: No JVD.   Cardiovascular:      Rate and Rhythm: Normal rate and regular rhythm.      Heart sounds: Normal heart sounds. No murmur heard.     No friction rub. No gallop.   Pulmonary:      Effort: Pulmonary effort is normal. No respiratory distress.      Breath sounds: Normal breath sounds. No stridor. No wheezing.      Comments: Did cough in the room but no audible wheezing and lungs are clear to auscultation    Chest:      Chest wall: No tenderness.   Abdominal:      General: Bowel sounds are normal. There is no distension.      Palpations: Abdomen is soft.      Tenderness: There is no abdominal tenderness. There is no guarding or rebound.   Musculoskeletal:         General: No tenderness or deformity. Normal range of motion.      Cervical back: Normal range of motion and neck supple.   Lymphadenopathy:      Cervical: No cervical adenopathy.   Skin:     General: Skin is warm and dry.      Coloration: Skin is not jaundiced or pale.      Findings: No bruising, erythema, lesion or rash.   Neurological:      Mental Status: He is alert. He is  disoriented.      Cranial Nerves: Dysarthria present. No cranial nerve deficit.      Motor: No seizure activity.      Coordination: Coordination normal.      Gait: Gait abnormal.      Comments: Is a truly nonfocal neuroexam states that he cannot move his left arm that he cannot raise it and then he is able to.  Not attempting gait testing right now because patient seems almost delirious and altered rather than focal with deficits.             ED Course     Labs Reviewed   COMP METABOLIC PANEL (14) - Abnormal; Notable for the following components:       Result Value    Glucose 103 (*)     Potassium 3.2 (*)     BUN 6 (*)     All other components within normal limits   URINALYSIS WITH CULTURE REFLEX - Abnormal; Notable for the following components:    Urine Color Colorless (*)     All other components within normal limits   POCT GLUCOSE - Abnormal; Notable for the following components:    POC Glucose 108 (*)     All other components within normal limits   SARS-COV-2 BY PCR (GENEXPERT) - Normal   RESPIRATORY FLU EXPAND PANEL + COVID-19 - Normal    Narrative:     This test is intended for the simultaneous qualitative detection and differentiation of nucleic acids from multiple viral and bacterial respiratory organisms, including nucleic acid from Severe Acute Respiratory Syndrome Coronavirus 2 (SARS-CoV-2) in nasopharyngeal swab from individuals suspected of respiratory viral infection consistent with COVID-19 by their healthcare provider.    Test performed using the [employer] Respiratory Panel 2.1 (RP2.1) assay on the [employer] 2.0 System, [employer], [place] [address].    This test is being used under the Food and Drug Administration's Emergency Use Authorization.    The authorized Fact Sheet for Healthcare Providers for this assay is available upon request from the laboratory.    SARS and MERS coronaviruses are not tested on this assay.   CBC WITH DIFFERENTIAL WITH PLATELET   PROTEIN, TOTAL, CSF    GLUCOSE, CEREBROSPINAL FLUID   CELL COUNT, CSF   MENINGITIS ENCEPHALITIS PANEL BY PCR   RAINBOW DRAW BLUE   CSF CULTURE   BLOOD CULTURE   BLOOD CULTURE     EKG    Rate, intervals and axes as noted on EKG Report.  Rate: 89  Rhythm: Sinus Rhythm  Reading: Normal sinus rhythm no acute ST elevation or significant ST depression to suggest acute ischemia normal EKG           ED Course as of 12/09/24 1706  ------------------------------------------------------------  Time: 12/09 1615  Comment: Discussed case with Dr Way for admission  ------------------------------------------------------------  Time: 12/09 1648  Value: Potassium(!): 3.2  Comment: Potassium, patient with significant pain on potassium replacement even with lidocaine 1 mL 1% added to the bag and following with fluids  ------------------------------------------------------------  Time: 12/09 1652  Comment: Continues to have headache despite Dilaudid Benadryl lorazepam and aggressive IV fluids.  Will give some Toradol as he appears sleepy       CT of the brain noncontrast is has immediately reviewed by myself does not show any signs of intracranial hemorrhage.  I independently reviewed that when CTA of the brain chest abdomen pelvis as reviewed with the radiologist we personally discussed these studies does not show any overt signs of infection including pneumonia, no signs of stroke on the CTA of the brain last time patient seen normal was 10:30 PM last night discussed the possibility of MRI to rule out encephalitis or cerebritis.  Will do MRI after lumbar puncture.     The patient requires an LP to rule out meningitis, infection .  Informed consent was obtained.  The standard timeout procedure was completed.  The patient was prepped and draped in sterile fashion.  Landmarks were identified.  Local anesthesia was achieved with 1% Lidocaine.  A 3.5 inch 22 gauge spinal needed was inserted into a lumbar interspinous space and spinal fluid was drained.   Approximately 0 ml’s of CSF were obtained and sent to the lab.   The needle was withdrawn.  While attempts unable to access the spinal space.  Therefore, asked IR to attempt  under fluoroscopy therefore  I asked our interventional radiologist to assist me.    XR LUMBAR PUNCTURE DIAG, INCLD IMG (CPT=62328)   Final Result   PROCEDURE:  XR LUMBAR PUNCTURE DIAG, ING IMG (CPT=62328)       COMPARISON:  None.       INDICATIONS:  fever, AMS, headache and neck pain, possible seizure,    concern for meningitis       TECHNIQUE:  Witnessed verbal and written informed consent was obtained.     Time out was performed by the staff. A lumbar puncture was performed under    fluoroscopic guidance in the usual sterile manner.        PATIENT STATED HISTORY: (As transcribed by Technologist)  Patient offered    no additional history at this time.         FLUOROSCOPY IMAGES OBTAINED:  2   FLUOROSCOPY TIME:  38 seconds   RADIATION DOSE (AIR KERMA PRODUCT):  96.95uGy/m2       FINDINGS:     Following a discussion of the procedure with the patient and/or family,    including its risks, benefits, alternatives, and steps to prevent    infection, a witnessed verbal and signed consent was obtained and    documented in the patient's chart.  Stated risks    included CSF leak and postural headaches,  bleeding, infection and    seizure.       The patient was placed prone on the fluoroscopic table and the back was    prepped with betadine and covered with a body drape in the usual sterile    fashion.  All operators present for the case performed standard    pre-procedural prep including hand washing,    sterile gloves, and mask.       A preprocedural time out was performed with all physicians, technologists,    and nurses involved with the procedure.       The skin over the lower back in this area was sterilely prepped and    draped. 1% lidocaine was applied with a 25 gauge needle for local    anesthesia. Lumbar puncture was performed with a 22 gauge  spinal needle    under fluoroscopic guidance.  The needle was    placed at the L3-L4 level. Clear CSF return was observed. Subsequently, 12    cc of fluid was collected and placed into 4 vials which were then sent to    lab. At the conclusion of the procedure, the stylet was replaced and    needle was removed. The patient    tolerated the procedure without immediate complication.                          =====   CONCLUSION:   Successful lumbar puncture without complication           LOCATION:  [facility]               Dictated by (CST): Bradley Santoyo MD on 12/09/2024 at 4:09 PM        Finalized by (CST): Bradley Santoyo MD on 12/09/2024 at 4:10 PM         CTA CHEST + CT ABD (W) + CT PEL (W) (CPT=71275/14300)   Final Result   PROCEDURE:  CTA CHEST + CT ABD (W) + CT PEL (W) SH(CPT=71275/66453)       COMPARISON:  [facility] , CT, CT ABDOMEN+PELVIS KIDNEYSTONE 2D RNDR(NO IV,NO    ORAL)(CPT=74176), 5/10/2024, 1:29 AM.  [facility] , CT, CT ABDOMEN+PELVIS    KIDNEYSTONE 2D RNDR(NO IV,NO ORAL)(CPT=74176), 7/31/2019, 10:16 AM.     [facility] , CT, CT ABDOMEN+PELVIS KIDNEYSTONE    2D RNDR(NO IV,NO ORAL)(CPT=74176), 6/12/2017, 10:05 PM.  [facility] , CT, CT    ABDOMEN+PELVIS KIDNEYSTONE 2D RNDR(NO IV,NO ORAL)(CPT=74176), 4/14/2017,    8:15 AM.       INDICATIONS:  fall, pain       TECHNIQUE:  CT of the chest (with CTA imaging), abdomen, and pelvis were    obtained post- injection of non-ionic intravenous contrast material.    Multi-planar reformatted/3-D images were created to optimize visualization    of vascular anatomy.  Dose reduction    techniques were used. Dose information is transmitted to the ACR (American    College of Radiology) NRDR (National Radiology Data Registry) which    includes the Dose Index Registry.       PATIENT STATED HISTORY:(As transcribed by Technologist)  fall.  Chest and    abdominal pain.        CONTRAST USED:  80cc of Isovue 370       FINDINGS:         CHEST:     LUNGS:  No visible pulmonary disease.  Subsegmental  atelectasis at the    lung bases dependently.   MEDIASTINUM:  No mass or adenopathy.     JOHAN:  No mass or adenopathy.     CARDIAC:  No enlargement, pericardial thickening, or significant coronary    artery calcification.   PLEURA:  No mass or effusion.     CHEST WALL:  No mass or axillary adenopathy.     AORTA:  No aneurysm or dissection.     VASCULATURE:  No visible pulmonary arterial thrombus or attenuation.         ABDOMEN/PELVIS:   LIVER:  No enlargement, atrophy, abnormal density, or significant focal    lesion.     BILIARY:  Status post cholecystectomy.  No ductal dilatation.   PANCREAS:  No lesion, fluid collection, ductal dilatation, or atrophy.     SPLEEN:  No enlargement or focal lesion.     KIDNEYS:  No mass, obstruction, or calcification.  Extrarenal pelvis on    the right.   ADRENALS:  No mass or enlargement.     AORTA:  No aneurysm or dissection.     RETROPERITONEUM:  No mass or adenopathy.     BOWEL/MESENTERY:  This to the mesentery is not significantly changed since    2019. This is likely some scarring from prior inflammation within the    mesentery.  The appendix is unremarkable.  No visible mass, obstruction,    or bowel wall thickening.  Few    scattered diverticula of the sigmoid colon without diverticulitis.   ABDOMINAL WALL:  No mass or hernia.     URINARY BLADDER:  No visible focal wall thickening, lesion, or calculus.     PELVIC NODES:  No adenopathy.     PELVIC ORGANS:  Mild prostatic hypertrophy.     BONES:  No bony lesion or fracture.  Mild degenerative changes of the    spine.                         =====   CONCLUSION:         1.  No evidence of acute cardiopulmonary process.  There is no evidence of    pulmonary embolus.         2. No acute abdominal pelvic process.  Stable haziness to the mesenteric    fat could be due to scarring as is not significantly changed since 2019.             LOCATION:  [place]           Dictated by (CST): Bryn Prieto MD on 12/09/2024 at 1:25 PM         Finalized by (CST): Bryn Prieto MD on 12/09/2024 at 1:33 PM         CT STROKE (JASMIN) CTA BRAIN/CTA NECK+PERF(CPT=70496/44129/0042T)   Final Result   PROCEDURE:  CT STROKE(JASMIN BRAIN)CTA BRAIN/CTA    NECK+PERF(CPT=70496/71341/0042T)       COMPARISON:  [facility] , CT, CT STROKE BRAIN (NO IV)(CPT=70450), 12/09/2024,    12:02 PM.  [facility] , CT, CT BRAIN OR HEAD (01455), 10/21/2024, 2:57 PM.     [facility] , CT, CT BRAIN OR HEAD (11773), 12/29/2018, 1:10 PM.       INDICATIONS:  fall, pain       TECHNIQUE:  Unenhanced followed by contrast enhanced multi-slice CT    angiography of the brain and neck vasculature using non-ionic contrast was    performed.  3D volume renderings are performed by the radiologist on an    independent workstation and interpreted    to optimize visualization of vascular anatomy.  PERFUSION:  Axial    sections were obtained per stroke protocol. Arterial and venous structures    were selected for purposes of brain perfusion mapping. All measurements    obtained in this exam were performed    using NASCET criteria.  Dose reduction techniques were used. Dose    information is transmitted to the ACR (American College of Radiology) NRDR    (National Radiology Data Registry) which includes the Dose Index Registry.       PATIENT STATED HISTORY:(As transcribed by Technologist)  code stroke.     left facial droop and left side weakness        CONTRAST USED:  115cc of Isovue 370       FINDINGS:     VASCULATURE:  No significant stenosis.  No visible aneurysm or vascular    malformation.  Mild atherosclerotic plaque in narrowing involving the V4    segment the right vertebral artery causing 30-40% luminal narrowing.   VENTRICLES:  Normal for age.  No enlargement or displacement.   CEREBRUM:  Normal for age.  No excessive atrophy, mass, or hemorrhage, or    abnormal enhancement.   CEREBELLUM:  Normal for age.  No excessive atrophy, mass, or hemorrhage,    or abnormal enhancement.   BRAINSTEM:  Normal for age.  No  excessive atrophy, mass, or hemorrhage, or    abnormal enhancement.   BASAL CISTERNS:  No subarachnoid hemorrhage or effacement.     SKULL:  Negative.         LEFT   INTERNAL CAROTID:  There is mild atherosclerotic plaque involving the    proximal terminal carotid artery causing 20-30% luminal narrowing.   EXTERNAL CAROTID:  No hemodynamically significant stenosis or dissection   COMMON CAROTID:  There is approximately 50% stenosis secondary to smooth    plaque within the mid common carotid artery.   VERTEBRAL:  No hemodynamically significant stenosis or dissection       RIGHT   INTERNAL CAROTID:   No hemodynamically significant stenosis or dissection   EXTERNAL CAROTID:    No hemodynamically significant stenosis or dissection   COMMON CAROTID:   No hemodynamically significant stenosis or dissection   VERTEBRAL:   No hemodynamically significant stenosis or dissection       OTHER:  The visualized soft tissues of the neck are also unremarkable.                               =====   CONCLUSION:         1. No acute intracranial process.         2. Unremarkable CTA of the [id number].         3. Mild/moderate atherosclerotic plaque of the left common and internal    carotid artery without hemodynamically significant stenosis.         Findings discussed with Dr. Nguyen at 1245 hours on 12/9/2024 with read    back.           LOCATION:  [facility]           Dictated by (CST): Bryn Prieto MD on 12/09/2024 at 12:39 PM        Finalized by (CST): Bryn Prieto MD on 12/09/2024 at 12:45 PM         CT STROKE BRAIN (NO IV)(CPT=70450)   Final Result   PROCEDURE:  CT STROKE BRAIN (NO IV)(CPT=70450)       COMPARISON:  [facility] , CT, CT BRAIN OR HEAD (01342), 10/21/2024, 2:57 PM.     [facility] , CT, CT BRAIN OR HEAD (31825), 12/29/2018, 1:10 PM.       INDICATIONS:  fall, pain       TECHNIQUE:  Noncontrast CT scanning is performed through the brain.  Dose    reduction techniques were used. Dose information is transmitted to the ACR     (American College of Radiology) NRDR (National Radiology Data Registry)    which includes the Dose Index    Registry.       PATIENT STATED HISTORY: (As transcribed by Technologist)  code stroke.     left side weakness and left facial droop            FINDINGS:     VENTRICLES/SULCI:  Ventricles and sulci are normal in size.     INTRACRANIAL:  There are no abnormal extraaxial fluid collections.  There    is no midline shift.  There are no intraparenchymal brain abnormalities.     There is nothing specific for acute infarct.  There is no hemorrhage or    mass lesion.     SINUSES:           No sign of acute sinusitis.     MASTOIDS:          No sign of acute inflammation.   SKULL:             No evidence for fracture or osseous abnormality.   OTHER:             None.                         =====   CONCLUSION:  No acute intracranial process.       Critical results were called to Dr. Nguyen at 12:18 pms on 12/9/2024.     There was appropriate read back.             LOCATION:  [facility]           Dictated by (CST): Bryn Prieto MD on 12/09/2024 at 12:13 PM        Finalized by (CST): Bryn Prieto MD on 12/09/2024 at 12:18 PM         MRI BRAIN (W+WO) (CPT=70553)    (Results Pending)          MDM      59-year-old presents to the emergency department after a known fall at home about 5:30 AM.  Wife found that he had bit his tongue and that his speech was slurred.  He continued to have a headache and neck pain and back pain and brought in this afternoon for rule out stroke given his thick speech.  He may have had a fever this past week and also will need to rule out meningitis, encephalitis, cerebritis, I also have concerns that this may have been a seizure with the report of having bit his tongue.  He has a history of non-Hodgkin's lymphoma that he is in remission for, CT the brain shows no signs of acute intracranial hemorrhage from the trauma of the fall no signs of stroke at this time given that he was having severe right  sided back pain and felt like his left arm was cold when he got here also did a CTA of the chest and arch to rule out aortic dissection he reported abdominal pain as well but his wife states that he frequently has pain all over his body because of his fibromyalgia.  Thankfully radiographic imaging is negative for those above issues.  He does not have a significant leukocytosis that would suggest severe systemic illness but the concern for ongoing congestion and ongoing cough fever this past week would make me want to rule out meningitis or encephalitis that could also contribute to seizure activity on review of the medical chart patient had a similar presentation a few months ago which does increase my suspicion for possible seizure please note the patient completely returned to baseline in the emergency department on the multiple evaluations that he had done.  I did not attempt an LP but whether positioning, patient's ability to adequately position herself, or technical placement I was unable to obtain CSF therefore the patient went to IR for lumbar puncture.  Discussed the case with the hospitalist and neurology for admission.  Dr. Bowser requested EEG.  This was ordered.  Patient is now stable in the emergency department we are treating his headache at this point    Admission disposition: 12/9/2024  4:24 PM       Total critical care time exclusive of procedures on this patient with multiple reevaluations and exclusive of procedure time which included lumbar puncture attempt is 50 minutes with multiple reevaluations critical/making coordination of care.  Please note this patient did not have a stroke does not need tenecteplase because he did not have a stroke.  Patient is also not septic therefore he does not need a sepsis reassessment he did receive a fluid bolus he did receive antibiotics for the concern for possible meningitis but he is not septic    Medical Decision Making      Disposition and Plan      Clinical Impression:  1. Altered mental status, unspecified altered mental status type    2. Fever, unspecified fever cause    3. Other headache syndrome         Disposition:  Admit  12/9/2024  4:24 pm    Follow-up:  No follow-up provider specified.        Medications Prescribed:  Current Discharge Medication List              Supplementary Documentation:

## 2024-12-09 NOTE — H&P
[facility]  [facility]     Alonzo Quevedo Patient Status:  Emergency    3/21/1965 MRN [id number]   [facility] EMERGENCY DEPARTMENT Attending Kim Nguyen MD   Hosp Day # 0 PCP Bruce Bernheim, MD     Chief Complaint: AMS, URI    Subjective:    History of Present Illness:     Alonzo Quevedo is a 59 year old male with a history of asthma, essential hypertension, VTE, lymphoma, fibromyalgia, restless leg syndrome and SHAWN who presents with AMS and URI symptoms. Patient was at home with family. He has had recurrent falls. This morning wife heard patient giving himself a neb tx for URI. She heard a thud, patient had had a fall. She was not concerned as it has occurred in the past. Patient went back to bed. He has been more sleepy the past few days d/t URI. Patient woke up and noted to be a bit confused, but it was attributed to patient having bit his tongue earlier in the morning. Patient was taken to IC for URI symptoms and sent to the ER for AMS. Patient is lethargic after medications and therefore unable to provide history. Wife at bedside provided history.     History/Other:    Past Medical History:  Past Medical History:    Anxiety state    Asthma (HCC)    Back pain    Back problem    Cancer (HCC)    Depression    DVT (deep venous thrombosis) (HCC)    Fibromyalgia    Follicular lymphoma grade I (HCC)    Hearing impairment    Herniated cervical disc    C5    Herniated lumbar intervertebral disc    High blood pressure    History of blood clots    bilateral legs & r.arm    Kidney calculus    Leukemia (HCC)    Restless leg syndrome    Unspecified sleep apnea    Visual impairment     Past Surgical History:   Past Surgical History:   Procedure Laterality Date    Cysto/uretero, stone remove Right 05/10/2024    stone extraction    Cystoscopy,fragmt ureteral stone Left     URS, laser, stone removal, Dr. Landa    Lithotripsy      ESWL and URS in the past    Repair ing  hernia,5+y/o,reducibl        Family History:   No family history on file.  Social History:    reports that he has never smoked. He has never used smokeless tobacco. He reports that he does not drink alcohol and does not use drugs.     Allergies: Allergies[1]    Medications:  Medications Ordered Prior to Encounter[2]    Review of Systems:   Limited d/t patient factors    Objective:   Physical Exam:    /88   Pulse 64   Temp 99.3 °F (37.4 °C) (Temporal)   Resp 13   Wt 164 lb 14.5 oz (74.8 kg)   SpO2 99%   BMI 26.62 kg/m²   General: No acute distress  HEENT NCAT  Respiratory: Mild exp wheeze noted   Cardiovascular: S1, S2. Regular rate and rhythm  Abdomen: Soft, Non-tender, non-distended, positive bowel sounds  Neuro: Unable to assess  Extremities: No edema    Results:    Labs:      Labs Last 24 Hours:    Recent Labs   Lab 12/09/24  1142   RBC 5.08   HGB 15.2   HCT 43.7   MCV 86.0   MCH 29.9   MCHC 34.8   RDW 12.9   NEPRELIM 7.43   WBC 10.4   .0       Recent Labs   Lab 12/09/24  1142   *   BUN 6*   CREATSERUM 0.95   EGFRCR 92   CA 10.0   ALB 4.5      K 3.2*      CO2 23.0   ALKPHO 113   AST 19   ALT 17   BILT 0.7   TP 8.0       Lab Results   Component Value Date    INR 0.92 08/28/2020       No results for input(s): \"TROP\", \"TROPHS\", \"CK\" in the last 168 hours.    No results for input(s): \"TROP\", \"PBNP\" in the last 168 hours.    No results for input(s): \"PCT\" in the last 168 hours.    Imaging: Imaging data reviewed in Epic.    Assessment & Plan:      #Acute encephalopathy history concerning for seizure activity, initial imaging unremarkable, LP does not speak to meningitis  -Hold on further abx  -MRI pending  -EEG  -Seizure precautions  -Neurology consult    #Asthma exacerbation  -Prednisone  -Nebs  -Antitussives    #Essential hypertension  -Losartan    #Restless leg syndrome  -Requip    #BPH  -Flomax    #History of VTE  #History of lymphoma  #Fibromyalgia  #SHAWN    Plan of care  discussed with family and ER.    Tarun Way MD    Supplementary Documentation:     The 21st Century Cures Act makes medical notes like these available to patients in the interest of transparency. Please be advised this is a medical document. Medical documents are intended to carry relevant information, facts as evident, and the clinical opinion of the practitioner. The medical note is intended as peer to peer communication and may appear blunt or direct. It is written in medical language and may contain abbreviations or verbiage that are unfamiliar.                                     [1]   Allergies  Allergen Reactions    Cat Dander [Dander] ANAPHYLAXIS    Nickel RASH    Polyestradiol ITCHING   [2]   Current Facility-Administered Medications on File Prior to Encounter   Medication Dose Route Frequency Provider Last Rate Last Admin    [COMPLETED] HYDROcodone-acetaminophen (Norco) 5-325 MG per tab 1 tablet  1 tablet Oral Once Abdiaziz Shepard MD   1 tablet at 10/21/24 1702     Current Outpatient Medications on File Prior to Encounter   Medication Sig Dispense Refill    gabapentin 300 MG Oral Cap Take 1 capsule (300 mg total) by mouth 2 (two) times daily. (Patient taking differently: Take 1 capsule (300 mg total) by mouth nightly.)      rOPINIRole 2 MG Oral Tab Take 1 tablet (2 mg total) by mouth nightly.      albuterol 108 (90 Base) MCG/ACT Inhalation Aero Soln Inhale 2 puffs into the lungs every 4 (four) hours as needed for Wheezing or Shortness of Breath.      Linolenic Acid (OMEGA 3 OR) Take 2 capsules by mouth every morning.      losartan 50 MG Oral Tab Take 1 tablet (50 mg total) by mouth daily.      modafinil 200 MG Oral Tab Take 1 tablet (200 mg total) by mouth daily.      tamsulosin HCl 0.4 MG Oral Cap Take 1 capsule (0.4 mg total) by mouth daily.      TraMADol HCl 50 MG Oral Tab Take 1 tablet (50 mg total) by mouth 2 (two) times daily.      NON FORMULARY Take 2 capsules by mouth every evening. Nutrafol (hair  supplement)      DUPIXENT 300 MG/2ML Subcutaneous Solution Pen-injector Inject 300 mg into the skin every 14 (fourteen) days.

## 2024-12-10 ENCOUNTER — CLINICAL DOCUMENTATION (OUTPATIENT)
Dept: GERIATRIC MEDICINE | Age: 59
End: 2024-12-10

## 2024-12-10 ENCOUNTER — NURSE ONLY (OUTPATIENT)
Dept: ELECTROPHYSIOLOGY | Facility: HOSPITAL | Age: 59
End: 2024-12-10
Attending: EMERGENCY MEDICINE
Payer: COMMERCIAL

## 2024-12-10 ENCOUNTER — APPOINTMENT (OUTPATIENT)
Dept: MRI IMAGING | Facility: HOSPITAL | Age: 59
End: 2024-12-10
Attending: EMERGENCY MEDICINE
Payer: COMMERCIAL

## 2024-12-10 DIAGNOSIS — R41.82 ALTERED MENTAL STATUS, UNSPECIFIED ALTERED MENTAL STATUS TYPE: Primary | ICD-10-CM

## 2024-12-10 PROBLEM — G93.40 ACUTE ENCEPHALOPATHY: Status: ACTIVE | Noted: 2024-12-10

## 2024-12-10 PROBLEM — R55 SYNCOPE: Status: ACTIVE | Noted: 2024-12-10

## 2024-12-10 LAB
ALBUMIN SERPL-MCNC: 3.9 G/DL (ref 3.2–4.8)
ALBUMIN/GLOB SERPL: 1.3 {RATIO} (ref 1–2)
ALP LIVER SERPL-CCNC: 94 U/L
ALT SERPL-CCNC: 14 U/L
ANION GAP SERPL CALC-SCNC: 6 MMOL/L (ref 0–18)
AST SERPL-CCNC: 14 U/L (ref ?–34)
BASOPHILS # BLD AUTO: 0.02 X10(3) UL (ref 0–0.2)
BASOPHILS NFR BLD AUTO: 0.3 %
BILIRUB SERPL-MCNC: 0.8 MG/DL (ref 0.3–1.2)
BUN BLD-MCNC: 7 MG/DL (ref 9–23)
CALCIUM BLD-MCNC: 9.3 MG/DL (ref 8.7–10.4)
CHLORIDE SERPL-SCNC: 112 MMOL/L (ref 98–112)
CO2 SERPL-SCNC: 22 MMOL/L (ref 21–32)
CREAT BLD-MCNC: 0.73 MG/DL
CYTOMEGALOVIRUS (CMV): NOT DETECTED
CYTOMEGALOVIRUS (CMV): NOT DETECTED
EGFRCR SERPLBLD CKD-EPI 2021: 105 ML/MIN/1.73M2 (ref 60–?)
ENTEROVIRUS (EV): NOT DETECTED
ENTEROVIRUS (EV): NOT DETECTED
EOSINOPHIL # BLD AUTO: 0.01 X10(3) UL (ref 0–0.7)
EOSINOPHIL NFR BLD AUTO: 0.1 %
ERYTHROCYTE [DISTWIDTH] IN BLOOD BY AUTOMATED COUNT: 13.2 %
ESCHERICHIA COLI K1: NOT DETECTED
ESCHERICHIA COLI K1: NOT DETECTED
GLOBULIN PLAS-MCNC: 2.9 G/DL (ref 2–3.5)
GLUCOSE BLD-MCNC: 132 MG/DL (ref 70–99)
HAEMOPHILUS INFLUENZAE: NOT DETECTED
HAEMOPHILUS INFLUENZAE: NOT DETECTED
HCT VFR BLD AUTO: 41.7 %
HERPES SIMPLEX VIRUS 1 (HSV-1): NOT DETECTED
HERPES SIMPLEX VIRUS 1 (HSV-1): NOT DETECTED
HERPES SIMPLEX VIRUS 2 (HSV-2): NOT DETECTED
HERPES SIMPLEX VIRUS 2 (HSV-2): NOT DETECTED
HGB BLD-MCNC: 14.4 G/DL
HUMAN HERPESVIRUS 6 (HHV-6): NOT DETECTED
HUMAN HERPESVIRUS 6 (HHV-6): NOT DETECTED
HUMAN PARECHOVIRUS (HPEV): NOT DETECTED
HUMAN PARECHOVIRUS (HPEV): NOT DETECTED
IMM GRANULOCYTES # BLD AUTO: 0.03 X10(3) UL (ref 0–1)
IMM GRANULOCYTES NFR BLD: 0.4 %
LISTERIA MONOCYTOGENES: NOT DETECTED
LISTERIA MONOCYTOGENES: NOT DETECTED
LYMPHOCYTES # BLD AUTO: 0.63 X10(3) UL (ref 1–4)
LYMPHOCYTES NFR BLD AUTO: 8.9 %
MCH RBC QN AUTO: 29.9 PG (ref 26–34)
MCHC RBC AUTO-ENTMCNC: 34.5 G/DL (ref 31–37)
MCV RBC AUTO: 86.5 FL
MONOCYTES # BLD AUTO: 0.07 X10(3) UL (ref 0.1–1)
MONOCYTES NFR BLD AUTO: 1 %
NEISSERIA MENINGIDITIS: NOT DETECTED
NEISSERIA MENINGIDITIS: NOT DETECTED
NEUTROPHILS # BLD AUTO: 6.34 X10 (3) UL (ref 1.5–7.7)
NEUTROPHILS # BLD AUTO: 6.34 X10(3) UL (ref 1.5–7.7)
NEUTROPHILS NFR BLD AUTO: 89.3 %
NS CRYPTOCOCCUS (C. NEOFORMANS/C. GATTII): NOT DETECTED
OSMOLALITY SERPL CALC.SUM OF ELEC: 290 MOSM/KG (ref 275–295)
PLATELET # BLD AUTO: 153 10(3)UL (ref 150–450)
POTASSIUM SERPL-SCNC: 4.5 MMOL/L (ref 3.5–5.1)
PROT SERPL-MCNC: 6.8 G/DL (ref 5.7–8.2)
RBC # BLD AUTO: 4.82 X10(6)UL
SODIUM SERPL-SCNC: 140 MMOL/L (ref 136–145)
STREPTOCOCCUS AGALACTIAE: NOT DETECTED
STREPTOCOCCUS AGALACTIAE: NOT DETECTED
STREPTOCOCCUS PNEUMONIAE: NOT DETECTED
STREPTOCOCCUS PNEUMONIAE: NOT DETECTED
VARICELLA ZOSTER VIRUS (VZV): NOT DETECTED
VARICELLA ZOSTER VIRUS (VZV): NOT DETECTED
WBC # BLD AUTO: 7.1 X10(3) UL (ref 4–11)

## 2024-12-10 PROCEDURE — 99233 SBSQ HOSP IP/OBS HIGH 50: CPT | Performed by: HOSPITALIST

## 2024-12-10 PROCEDURE — 95816 EEG AWAKE AND DROWSY: CPT | Performed by: OTHER

## 2024-12-10 PROCEDURE — 70553 MRI BRAIN STEM W/O & W/DYE: CPT | Performed by: EMERGENCY MEDICINE

## 2024-12-10 RX ORDER — BUTALBITAL, ACETAMINOPHEN AND CAFFEINE 50; 325; 40 MG/1; MG/1; MG/1
1 TABLET ORAL EVERY 4 HOURS PRN
Status: DISCONTINUED | OUTPATIENT
Start: 2024-12-10 | End: 2024-12-11

## 2024-12-10 RX ORDER — IPRATROPIUM BROMIDE AND ALBUTEROL SULFATE 2.5; .5 MG/3ML; MG/3ML
3 SOLUTION RESPIRATORY (INHALATION)
Status: DISCONTINUED | OUTPATIENT
Start: 2024-12-10 | End: 2024-12-11

## 2024-12-10 RX ORDER — RIZATRIPTAN BENZOATE 10 MG/1
10 TABLET, ORALLY DISINTEGRATING ORAL ONCE
Status: COMPLETED | OUTPATIENT
Start: 2024-12-10 | End: 2024-12-10

## 2024-12-10 RX ORDER — GADOTERATE MEGLUMINE 376.9 MG/ML
15 INJECTION INTRAVENOUS
Status: COMPLETED | OUTPATIENT
Start: 2024-12-10 | End: 2024-12-10

## 2024-12-10 NOTE — PHYSICAL THERAPY NOTE
PHYSICAL THERAPY EVALUATION - INPATIENT     Room Number: 3601/3601-A  Evaluation Date: 12/10/2024  Type of Evaluation: Initial  Physician Order: PT Eval and Treat    Presenting Problem: AMS  Co-Morbidities : lumbar disc bulge w/out myelopathy, left sciatic pain, fibromyalgia  Reason for Therapy: Mobility Dysfunction and Discharge Planning    MRI Brain  CONCLUSION:  There are a few scattered areas of FLAIR signal hyperintensity noted within the white matter.  These are nonspecific and could represent sequela of chronic microvascular ischemic changes, chronic migraine headaches, demyelinating disease,   among other etiologies.  There is no restricted diffusion to suggest acute infarct.     PHYSICAL THERAPY ASSESSMENT   Patient is a 59 year old male admitted 12/9/2024 for AMS.  Prior to admission, patient's baseline is Mod I.  Patient is currently functioning near baseline with bed mobility, transfers, and gait.  Patient is requiring contact guard assist as a result of the following impairments: decreased endurance/aerobic capacity, pain, impaired Gait and standing balance, and decreased muscular endurance.  Physical Therapy will continue to follow for duration of hospitalization.    Patient will benefit from continued skilled PT Services at discharge to promote prior level of function.  Anticipate patient will return home with OP PT.    PLAN DURING HOSPITALIZATION  Nursing Mobility Recommendation : 1 Assist  PT Device Recommendation: Rolling walker  PT Treatment Plan: Bed mobility;Body mechanics;Endurance;Family education;Gait training;Strengthening;Stair training;Transfer training;Balance training  Rehab Potential : Good  Frequency (Obs): 3-5x/week     CURRENT GOALS    Goal #1 Patient is able to demonstrate supine - sit EOB @ level: supervision     Goal #2 Patient is able to demonstrate transfers EOB to/from BSC at assistance level: supervision     Goal #3 Patient is able to ambulate 150 feet with assist device:  walker - rolling at assistance level: supervision     Goal #4    Goal #5    Goal #6    Goal Comments: Goals established on 12/10/2024      PHYSICAL THERAPY MEDICAL/SOCIAL HISTORY  History related to current admission: Patient is a 59 year old male admitted on 12/9/2024 from Home for AMS.    HOME SITUATION  Type of Home: Apartment  Home Layout:  (second floor walk-up)                     Lives With: Spouse    Drives: Yes   Patient Regularly Uses: None      Prior Level of Petroleum: Pt states that he lives in an apt with his spouse and 2 adult children. Pt reports that he is IND with all ADLs. Pt reports that when he feels strong he does not need an assisted device and is able to participate in exercise. However, pt reports that he occasionally has to use a SPC when he feels not well. Pt and pt's wife reports that he has a history of falls which includes knee buckling unexpectedly. Recently he fell out of bed.     SUBJECTIVE   \"I have a migraine\"    OBJECTIVE  Precautions: Bed/chair alarm  Fall Risk: High fall risk    WEIGHT BEARING RESTRICTION     PAIN ASSESSMENT  Rating: Unable to rate  Location: Low back       COGNITION  Overall Cognitive Status:  WFL - within functional limits    RANGE OF MOTION AND STRENGTH ASSESSMENT  Upper extremity ROM and strength are within functional limits     Lower extremity ROM is within functional limits     Lower extremity strength is within functional limits     BALANCE  Static Sitting: Fair +  Dynamic Sitting: Fair +  Static Standing: Fair  Dynamic Standing: Fair -    ADDITIONAL TESTS                                    ACTIVITY TOLERANCE                         O2 WALK       NEUROLOGICAL FINDINGS                        AM-PAC '6-Clicks' INPATIENT SHORT FORM - BASIC MOBILITY  How much difficulty does the patient currently have...  Patient Difficulty: Turning over in bed (including adjusting bedclothes, sheets and blankets)?: A Little   Patient Difficulty: Sitting down on and  standing up from a chair with arms (e.g., wheelchair, bedside commode, etc.): A Little   Patient Difficulty: Moving from lying on back to sitting on the side of the bed?: A Little   How much help from another person does the patient currently need...   Help from Another: Moving to and from a bed to a chair (including a wheelchair)?: A Little   Help from Another: Need to walk in hospital room?: A Little   Help from Another: Climbing 3-5 steps with a railing?: A Little     AM-PAC Score:  Raw Score: 18   Approx Degree of Impairment: 46.58%   Standardized Score (AM-PAC Scale): 43.63   CMS Modifier (G-Code): CK    FUNCTIONAL ABILITY STATUS  Gait Assessment   Functional Mobility/Gait Assessment  Gait Assistance: Contact guard assist  Distance (ft): 120  Assistive Device: Rolling walker;Cane  Pattern: Shuffle    Skilled Therapy Provided     Bed Mobility:  Rolling: NT  Supine to sit: SBA   Sit to supine: SBA     Transfer Mobility:  Sit to stand: SBA   Stand to sit: SBA  Gait = CGA 120ft initially using the RW and transitioned to the SPC. Though pt was CGA with SPC pt was observed with an inefficient gait pattern and an increase shuffle using the SPC vs the RW.     Therapist's Comments: Educated pt on the benefits of using the RW when ambulating to reduce the risk falls at home.     Exercise/Education Provided:  Bed mobility  Body mechanics  Gait training  Transfer training    Patient End of Session: Call light within reach;RN aware of session/findings;All patient questions and concerns addressed;Alarm set;With  staff;Family present      Patient Evaluation Complexity Level:  History Moderate - 1 or 2 personal factors and/or co-morbidities   Examination of body systems Low -  addressing 1-2 elements   Clinical Presentation Low- Stable   Clinical Decision Making Low Complexity       PT Session Time: 23 minutes  Therapeutic Activity: 15 minutes

## 2024-12-10 NOTE — PROGRESS NOTES
[facility]   part of [facility]     Hospitalist Progress Note     Alonzo Quevedo Patient Status:  Observation    3/21/1965 MRN [id number]   Location [facility] 3NE-A Attending Scott Nuñez MD   Hosp Day # 0 PCP Bruce Bernheim, MD     Subjective:   Feeling much better  Breathing better  States he has been very busy traveling and stopped his medications due to driving long hours. He had severe sleep deprivation due to current situation and stopping medications. Therefore he took all his meds at once last night- (tramadol, gabapentin, requip, and rizatriptan).  He states he was so tired he was having trouble talking after taking his meds and bit his tongue    Objective:    Review of Systems:   A comprehensive review of systems was completed; pertinent positive and negatives stated in subjective.  Vital signs:  Temp:  [97.8 °F (36.6 °C)-99.3 °F (37.4 °C)] 98.3 °F (36.8 °C)  Pulse:  [60-90] 65  Resp:  [12-26] 16  BP: (119-178)/() 131/82  SpO2:  [95 %-100 %] 96 %  Physical Exam:    General: No acute distress    Respiratory: no wheezes, no rhonchi  Cardiovascular: S1, S2, RRR  Abdomen: Soft, NT/ND, +BS  Extremities: no edema    Diagnostic Data:    Labs:  Recent Labs   Lab 24  1142 12/10/24  0629   WBC 10.4 7.1   HGB 15.2 14.4   MCV 86.0 86.5   .0 153.0     Recent Labs   Lab 24  1142 12/10/24  0629   * 132*   BUN 6* 7*   CREATSERUM 0.95 0.73   CA 10.0 9.3   ALB 4.5 3.9    140   K 3.2* 4.5    112   CO2 23.0 22.0   ALKPHO 113 94   AST 19 14   ALT 17 14   BILT 0.7 0.8   TP 8.0 6.8     Estimated Creatinine Clearance: 98.3 mL/min (based on SCr of 0.73 mg/dL).  No results for input(s): \"PTP\", \"INR\" in the last 168 hours.     Microbiology  Hospital Encounter on 24   1. CSF culture     Status: None (Preliminary result)    Collection Time: 24  3:43 PM    Specimen: CSF, lumbar puncture; Cerebral spinal fluid   Result Value Ref Range    CSF Smear No organisms  seen N/A    CSF Smear No WBCs seen N/A    CSF Smear This is a cytocentrifuged smear. N/A     Imaging: Reviewed in Epic.  Medications:    ipratropium-albuterol  3 mL Nebulization Q6H WA    LORazepam  2 mg Intravenous Once    gabapentin  300 mg Oral Nightly    losartan  50 mg Oral Daily    tamsulosin  0.4 mg Oral Daily    rOPINIRole  2 mg Oral Nightly    modafinil  200 mg Oral Daily    predniSONE  40 mg Oral Daily with breakfast    guaiFENesin ER  600 mg Oral BID       Assessment & Plan:    #Acute encephalopathy - unclear etiology. States he had a history of a seizure. That said he admits to very busy traveling and stopped his medications due to driving long hours. He had severe sleep deprivation due to current situation and stopping medications. Therefore he took all his meds at once last night- (tramadol, gabapentin, requip, and rizatriptan).  He states he was so tired he was having trouble talking after taking his meds and bit his tongue   -LP does not speak to meningitis  -Hold on further abx  -MRI brain reviewed- defer to Neurology   -EEG results pending per Neuro   -Seizure precautions  -Neurology consulted  -limit meds but will give Rizatriptan for HA     #Asthma exacerbation  -Prednisone  -Nebs  -Antitussives     #Essential hypertension-Losartan     #Restless leg syndrome- Requip     #BPH-Flomax     #History of VTE  #History of lymphoma  #Fibromyalgia  #SHAWN  #Hypokalemia- replaced          Supplementary Documentation:   Quality:  DVT Mechanical Prophylaxis:   SCDs,    DVT Pharmacologic Prophylaxis   Medication   None                Code Status: Full Code  Mauricio: No urinary catheter in place  Mauricio Duration (in days):   Central line:    BENY:   At this point Mr. Quevedo is expected to be discharge to: tbd     The 21st Century Cures Act makes medical notes like these available to patients in the interest of transparency. Please be advised this is a medical document. Medical documents are intended to carry  relevant information, facts as evident, and the clinical opinion of the practitioner. The medical note is intended as peer to peer communication and may appear blunt or direct. It is written in medical language and may contain abbreviations or verbiage that are unfamiliar.              **Certification      PHYSICIAN Certification of Need for Inpatient Hospitalization - Initial Certification    Patient will require inpatient services that will reasonably be expected to span two midnight's based on the clinical documentation in H+P.   Based on patients current state of illness, I anticipate that, after discharge, patient will require TBD.

## 2024-12-10 NOTE — OCCUPATIONAL THERAPY NOTE
OCCUPATIONAL THERAPY EVALUATION - INPATIENT     Room Number: 3601/3601-A  Evaluation Date: 12/10/2024  Type of Evaluation: Initial  Presenting Problem: AMS, falls, weakness, cough    Physician Order: IP Consult to Occupational Therapy  Reason for Therapy: ADL/IADL Dysfunction and Discharge Planning    OCCUPATIONAL THERAPY ASSESSMENT   Patient is currently functioning below baseline with toileting, lower body dressing, grooming, bed mobility, transfers, dynamic standing balance, and functional standing tolerance. Prior to admission, patient's baseline is mod I with use of cane.  Patient is requiring minimum assistance as a result of the following impairments: decreased functional strength, decreased endurance, impaired standing balance, impaired coordination, and decreased safety awareness. Occupational Therapy will continue to follow for duration of hospitalization.    Patient will benefit from continued skilled OT Services with no additional skilled services but increased support at home    History Related to Current Admission: Patient is a 59 year old male admitted on 12/9/2024 with Presenting Problem: AMS, falls, weakness, cough. Co-Morbidities : lumbar disc bulge w/out myelopathy, left sciatic pain, fibromyalgia    Recommendations for nursing staff:   Transfers: one person and RW  Toileting location: toilet    EVALUATION SESSION    Patient Start of Session: supine    FUNCTIONAL TRANSFER ASSESSMENT  Sit to Stand: Edge of Bed; Chair  Edge of Bed: Contact Guard Assist  Chair: Contact Guard Assist  Toilet Transfer: Stand-by Assist    BED MOBILITY  Rolling: Supervision  Supine to Sit : Supervision  Sit to Supine (OT): Supervision  Scooting: supervision    BALANCE ASSESSMENT  Static Sitting: Modified Independent  Static Standing: Stand-by Assist    FUNCTIONAL ADL ASSESSMENT  Eating: Independent  Grooming Seated: Modified Independent  LB Dressing Seated: Minimal Assist    ACTIVITY TOLERANCE:    SBP: 170-180's in  sitting and standing.  Patient w/ c/o migraine headache.  Patient's nurse aware and obtaining meds                      O2 SATURATIONS       Cognition  Alert, oriented x 3  Follows simple motor commands  Needs frequent cues for safety awareness during mobility, standing ADLs    Upper extremity  BUE AROM and strength WFL    Sensation: intact light touch BUE; c/o occ numbness in bilateral hands and reports this is chronic and states it is d/t h/o fibromaylgia  EDUCATION PROVIDED  Patient Education : Role of Occupational Therapy; Plan of Care; Discharge Recommendations  Patient's Response to Education: Verbalized Understanding; Requires Further Education  Family/Caregiver Education : Role of Occupational Therapy; Plan of Care  Family/Caregiver's Response to Education: Verbalized Understanding    Equipment used: RW  Demonstrates functional use, Would benefit from additional trial     Therapist comments: OT educated patient on safety,  sequencing, energy conservation, pain management, home modifications and adaptive equipment to increase independence with ADLs.  Patient needs frequent education on importance of safety techniques and use of walker.      Patient End of Session: Up in chair;Needs met;Call light within reach;RN aware of session/findings;All patient questions and concerns addressed;Alarm set;Discussed recommendations with /;Hospital anti-slip socks;Family present    OCCUPATIONAL PROFILE    HOME SITUATION  Type of Home: Apartment  Home Layout:  (second floor walk-up)  Lives With: Spouse    Toilet and Equipment: Standard height toilet  Shower/Tub and Equipment: Walk-in shower  Other Equipment:  (cane, walker)       Hand Dominance: Right          Prior Level of Function: Mod I with ADLs and functional mobility without use of adaptive device with use of cane.  Patient's wife did purchase a walker for patient, however he declines to use it.  Patient and wife state patient has fallen  multiple times in the past 2 months, agreeing to at least 4-5 falls d/t knees buckling and giving out without notice.  Patient states he knows it is d/t his \"bad back and neck\" and epidural shots help for  a while.      SUBJECTIVE   \"I feel no pain when I'm laying flat or standing upright.\"    PAIN ASSESSMENT  Rating: Unable to rate  Location: BLE, ,back, left side of neck  Management Techniques: Nurse notified;Repositioning;Relaxation;Body mechanics;Activity promotion    OBJECTIVE  Precautions: Bed/chair alarm  Fall Risk: High fall risk    WEIGHT BEARING RESTRICTION       ASSESSMENTS    AM-PAC '6-Clicks' Inpatient Daily Activity Short Form  -   Putting on and taking off regular lower body clothing?: A Little  -   Bathing (including washing, rinsing, drying)?: A Little  -   Toileting, which includes using toilet, bedpan or urinal? : A Little  -   Putting on and taking off regular upper body clothing?: A Little  -   Taking care of personal grooming such as brushing teeth?: None  -   Eating meals?: None    AM-PAC Score:  Score: 20  Approx Degree of Impairment: 38.32%  Standardized Score (AM-PAC Scale): 42.03    PLAN  OT Device Recommendations: Shower chair (bed rail)  OT Treatment Plan: Balance activities;Energy conservation/work simplification techniques;ADL training;Functional transfer training;UE strengthening/ROM;Endurance training;Patient/Family education;Patient/Family training;Cognitive reorientation;Equipment eval/education;Compensatory technique education  Rehab Potential : Good  Frequency: 3-5x/week  Number of Visits to Meet Established Goals: 7    ADL Goals  Patient will perform toileting with supervision and AE PRN  Patient will perform LB dressing with supervision and AE PRN    Functional Transfer Goals  Patient will perform bed mobility supine to sit with supervision  Patient will perform bed mobility sit to supine with supervision  Patient will perform toilet transfer with supervision    Additional  Goals:  Patient will state precautions and maintain during ADL      Patient Evaluation Complexity Level:   Occupational Profile/Medical History MODERATE - Expanded review of history including review of medical or therapy record   Specific performance deficits impacting engagement in ADL/IADL MODERATE  3 - 5 performance deficits   Client Assessment/Performance Deficits MODERATE - Comorbidities and min to mod modifications of tasks    Clinical Decision Making LOW - Analysis of occupational profile, problem-focused assessments, limited treatment options    Overall Complexity LOW     OT Session Time: 30 minutes  Self-Care Home Management: 10 minutes

## 2024-12-10 NOTE — PROCEDURES
EEG REPORT;    Reason for Examination: Encephalopathy    Technical Summary:   18 Channels of EEG and 1 Channel of EKG was performed utilizing internation 10/20 method.        Background Activity:   The background activity consisted of 9-10 Hz waveforms, reactive to eye opening/ external stimulation.    Activation:    Hyperventilation:   Not Performed.    Photic Stimulation:  Driving response seen.No       Sleep:  Stage I sleep seen.     Impression:  This is a Normal EEG. No focal, lateralized or generalized epileptiform activity seen.       Ochoa Downs MD  [facility].

## 2024-12-10 NOTE — PLAN OF CARE
Assumed pt care this morning at 0730.  Pt is A&Ox4, following commands.   Pt on room air, VSS.  NSR  Pt reports migraine pain, PRN Fioricet ordered and scheduled Rizatriptan.  Pt ambulates with cane baseline, more steady when up x1 w/ walker currently. Cane from home is bedside.  Pt on regular diet. Tolerating diet.   PIV in L upper and R AC, both SL.  Neuro Q4,   now new deficits. NIH daily. Szr precautions.  Contact and droplet iso for r/o meningitis.   Bed in lowest position, call light in reach.        Problem: PAIN - ADULT  Goal: Verbalizes/displays adequate comfort level or patient's stated pain goal  Description: INTERVENTIONS:  - Encourage pt to monitor pain and request assistance  - Assess pain using appropriate pain scale  - Administer analgesics based on type and severity of pain and evaluate response  - Implement non-pharmacological measures as appropriate and evaluate response  - Consider cultural and social influences on pain and pain management  - Manage/alleviate anxiety  - Utilize distraction and/or relaxation techniques  - Monitor for opioid side effects  - Notify MD/LIP if interventions unsuccessful or patient reports new pain  - Anticipate increased pain with activity and pre-medicate as appropriate  Outcome: Progressing     Problem: NEUROLOGICAL - ADULT  Goal: Achieves stable or improved neurological status  Description: INTERVENTIONS  - Assess for and report changes in neurological status  - Initiate measures to prevent increased intracranial pressure  - Maintain blood pressure and fluid volume within ordered parameters to optimize cerebral perfusion and minimize risk of hemorrhage  - Monitor temperature, glucose, and sodium. Initiate appropriate interventions as ordered  Outcome: Progressing  Goal: Absence of seizures  Description: INTERVENTIONS  - Monitor for seizure activity  - Administer anti-seizure medications as ordered  - Monitor neurological status  Outcome: Progressing  Goal: Remains  free of injury related to seizure activity  Description: INTERVENTIONS:  - Maintain airway, patient safety  and administer oxygen as ordered  - Monitor patient for seizure activity, document and report duration and description of seizure to MD/LIP  - If seizure occurs, turn patient to side and suction secretions as needed  - Reorient patient post seizure  - Seizure pads on all 4 side rails  - Instruct patient/family to notify RN of any seizure activity  - Instruct patient/family to call for assistance with activity based on assessment  Outcome: Progressing  Goal: Achieves maximal functionality and self care  Description: INTERVENTIONS  - Monitor swallowing and airway patency with patient fatigue and changes in neurological status  - Encourage and assist patient to increase activity and self care with guidance from PT/OT  - Encourage visually impaired, hearing impaired and aphasic patients to use assistive/communication devices  Outcome: Progressing     Problem: RESPIRATORY - ADULT  Goal: Achieves optimal ventilation and oxygenation  Description: INTERVENTIONS:  - Assess for changes in respiratory status  - Assess for changes in mentation and behavior  - Position to facilitate oxygenation and minimize respiratory effort  - Oxygen supplementation based on oxygen saturation or ABGs  - Provide Smoking Cessation handout, if applicable  - Encourage broncho-pulmonary hygiene including cough, deep breathe, Incentive Spirometry  - Assess the need for suctioning and perform as needed  - Assess and instruct to report SOB or any respiratory difficulty  - Respiratory Therapy support as indicated  - Manage/alleviate anxiety  - Monitor for signs/symptoms of CO2 retention  Outcome: Progressing

## 2024-12-10 NOTE — PLAN OF CARE
Received pt asleep for noc shift. Complaints of back pain.Cpap at  night. For  MRI of the brain. PT to see. Seizure prec. NQ2 till 2am. RUST daily. Safety and fall prec maintained. Bed in lowest position, bed alarm on. POC on-going          Problem: PAIN - ADULT  Goal: Verbalizes/displays adequate comfort level or patient's stated pain goal  Description: INTERVENTIONS:  - Encourage pt to monitor pain and request assistance  - Assess pain using appropriate pain scale  - Administer analgesics based on type and severity of pain and evaluate response  - Implement non-pharmacological measures as appropriate and evaluate response  - Consider cultural and social influences on pain and pain management  - Manage/alleviate anxiety  - Utilize distraction and/or relaxation techniques  - Monitor for opioid side effects  - Notify MD/LIP if interventions unsuccessful or patient reports new pain  - Anticipate increased pain with activity and pre-medicate as appropriate  Outcome: Progressing     Problem: NEUROLOGICAL - ADULT  Goal: Achieves stable or improved neurological status  Description: INTERVENTIONS  - Assess for and report changes in neurological status  - Initiate measures to prevent increased intracranial pressure  - Maintain blood pressure and fluid volume within ordered parameters to optimize cerebral perfusion and minimize risk of hemorrhage  - Monitor temperature, glucose, and sodium. Initiate appropriate interventions as ordered  Outcome: Progressing  Goal: Absence of seizures  Description: INTERVENTIONS  - Monitor for seizure activity  - Administer anti-seizure medications as ordered  - Monitor neurological status  Outcome: Progressing  Goal: Remains free of injury related to seizure activity  Description: INTERVENTIONS:  - Maintain airway, patient safety  and administer oxygen as ordered  - Monitor patient for seizure activity, document and report duration and description of seizure to MD/LIP  - If seizure occurs,  turn patient to side and suction secretions as needed  - Reorient patient post seizure  - Seizure pads on all 4 side rails  - Instruct patient/family to notify RN of any seizure activity  - Instruct patient/family to call for assistance with activity based on assessment  Outcome: Progressing  Goal: Achieves maximal functionality and self care  Description: INTERVENTIONS  - Monitor swallowing and airway patency with patient fatigue and changes in neurological status  - Encourage and assist patient to increase activity and self care with guidance from PT/OT  - Encourage visually impaired, hearing impaired and aphasic patients to use assistive/communication devices  Outcome: Progressing     Problem: RESPIRATORY - ADULT  Goal: Achieves optimal ventilation and oxygenation  Description: INTERVENTIONS:  - Assess for changes in respiratory status  - Assess for changes in mentation and behavior  - Position to facilitate oxygenation and minimize respiratory effort  - Oxygen supplementation based on oxygen saturation or ABGs  - Provide Smoking Cessation handout, if applicable  - Encourage broncho-pulmonary hygiene including cough, deep breathe, Incentive Spirometry  - Assess the need for suctioning and perform as needed  - Assess and instruct to report SOB or any respiratory difficulty  - Respiratory Therapy support as indicated  - Manage/alleviate anxiety  - Monitor for signs/symptoms of CO2 retention  Outcome: Progressing

## 2024-12-11 VITALS
SYSTOLIC BLOOD PRESSURE: 148 MMHG | TEMPERATURE: 99 F | RESPIRATION RATE: 17 BRPM | DIASTOLIC BLOOD PRESSURE: 83 MMHG | OXYGEN SATURATION: 94 % | WEIGHT: 164.88 LBS | BODY MASS INDEX: 27 KG/M2 | HEART RATE: 75 BPM

## 2024-12-11 PROCEDURE — 99239 HOSP IP/OBS DSCHRG MGMT >30: CPT | Performed by: HOSPITALIST

## 2024-12-11 RX ORDER — RIZATRIPTAN BENZOATE 10 MG/1
TABLET ORAL
Qty: 12 TABLET | Refills: 3 | Status: SHIPPED | OUTPATIENT
Start: 2024-12-11

## 2024-12-11 NOTE — PROGRESS NOTES
[facility]   part of [facility]     Hospitalist Progress Note     Alonzo Quevedo Patient Status:  Observation    3/21/1965 MRN [id number]   Location [facility] 3NE-A Attending Scott Nuñez MD   Hosp Day # 0 PCP Bruce Bernheim, MD     Subjective:   Doing great     Objective:    Review of Systems:   A comprehensive review of systems was completed; pertinent positive and negatives stated in subjective.  Vital signs:  Temp:  [97.5 °F (36.4 °C)-98.5 °F (36.9 °C)] 98.1 °F (36.7 °C)  Pulse:  [64-88] 76  Resp:  [16-17] 16  BP: (108-157)/(85-89) 153/86  SpO2:  [94 %-100 %] 96 %  Physical Exam:    General: No acute distress    Respiratory: no wheezes, no rhonchi  Cardiovascular: S1, S2, RRR  Abdomen: Soft, NT/ND, +BS  Extremities: no edema    Diagnostic Data:    Labs:  Recent Labs   Lab 24  1142 12/10/24  0629   WBC 10.4 7.1   HGB 15.2 14.4   MCV 86.0 86.5   .0 153.0     Recent Labs   Lab 24  1142 12/10/24  0629   * 132*   BUN 6* 7*   CREATSERUM 0.95 0.73   CA 10.0 9.3   ALB 4.5 3.9    140   K 3.2* 4.5    112   CO2 23.0 22.0   ALKPHO 113 94   AST 19 14   ALT 17 14   BILT 0.7 0.8   TP 8.0 6.8     Estimated Creatinine Clearance: 98.3 mL/min (based on SCr of 0.73 mg/dL).  No results for input(s): \"PTP\", \"INR\" in the last 168 hours.     Microbiology  Hospital Encounter on 24   1. CSF culture     Status: None (Preliminary result)    Collection Time: 24  3:43 PM    Specimen: CSF, lumbar puncture; Cerebral spinal fluid   Result Value Ref Range    CSF Culture Result No Growth 2 Days N/A    CSF Smear No organisms seen N/A    CSF Smear No WBCs seen N/A    CSF Smear This is a cytocentrifuged smear. N/A   2. Blood Culture     Status: None (Preliminary result)    Collection Time: 24  2:51 PM    Specimen: Blood,peripheral   Result Value Ref Range    Blood Culture Result No Growth 1 Day N/A     Imaging: Reviewed in Epic.  Medications:    ipratropium-albuterol  3 mL  Nebulization Q6H WA    LORazepam  2 mg Intravenous Once    gabapentin  300 mg Oral Nightly    losartan  50 mg Oral Daily    tamsulosin  0.4 mg Oral Daily    rOPINIRole  2 mg Oral Nightly    modafinil  200 mg Oral Daily    predniSONE  40 mg Oral Daily with breakfast    guaiFENesin ER  600 mg Oral BID       Assessment & Plan:    #Acute encephalopathy - unclear etiology. States he had a history of a seizure. That said he admits to very busy traveling and stopped his medications due to driving long hours. He had severe sleep deprivation due to current situation and stopping medications. Therefore he took all his meds at once last night- (tramadol, gabapentin, requip, and rizatriptan).  He states he was so tired he was having trouble talking after taking his meds and bit his tongue   -LP does not speak to meningitis  -Hold on further abx  -MRI brain reviewed- defer to Neurology   -EEG results per Neuro   -Seizure precautions  -Neurology consulted  -limit meds but will give Rizatriptan for HA     #Asthma exacerbation  -Prednisone burst- no need for further tapering dose- no wheezing or SOB      #Essential hypertension-Losartan     #Restless leg syndrome- Requip only as needed      #BPH-Flomax     #History of VTE  #History of lymphoma  #Fibromyalgia  #SHAWN  #Hypokalemia- replaced     DC planning, d/w patient regarding medications and timing, usage. Pt understands and will limit accordingly.           Supplementary Documentation:   Quality:  DVT Mechanical Prophylaxis:   SCDs,    DVT Pharmacologic Prophylaxis   Medication   None                Code Status: Full Code  Mauricio: No urinary catheter in place  Mauricio Duration (in days):   Central line:    BENY:   At this point Mr. Quevedo is expected to be discharge to: tbd     The 21st Century Cures Act makes medical notes like these available to patients in the interest of transparency. Please be advised this is a medical document. Medical documents are intended to carry relevant  information, facts as evident, and the clinical opinion of the practitioner. The medical note is intended as peer to peer communication and may appear blunt or direct. It is written in medical language and may contain abbreviations or verbiage that are unfamiliar.              **Certification      PHYSICIAN Certification of Need for Inpatient Hospitalization - Initial Certification    Patient will require inpatient services that will reasonably be expected to span two midnight's based on the clinical documentation in H+P.   Based on patients current state of illness, I anticipate that, after discharge, patient will require TBD.

## 2024-12-11 NOTE — PLAN OF CARE
Assumed pt care this morning at 0730.  Pt is A&Ox4, following commands.   Pt on room air, VSS.  NSR  Pt had severe headache early in morning. PRN Fioricet given w/ relief.  Pt up x1 w/ walker to br  Pt on regular diet. Tolerating diet.   PIV L upper and R AC both SL.  Bed in lowest position, call light in reach.        Problem: PAIN - ADULT  Goal: Verbalizes/displays adequate comfort level or patient's stated pain goal  Description: INTERVENTIONS:  - Encourage pt to monitor pain and request assistance  - Assess pain using appropriate pain scale  - Administer analgesics based on type and severity of pain and evaluate response  - Implement non-pharmacological measures as appropriate and evaluate response  - Consider cultural and social influences on pain and pain management  - Manage/alleviate anxiety  - Utilize distraction and/or relaxation techniques  - Monitor for opioid side effects  - Notify MD/LIP if interventions unsuccessful or patient reports new pain  - Anticipate increased pain with activity and pre-medicate as appropriate  Outcome: Progressing     Problem: NEUROLOGICAL - ADULT  Goal: Achieves stable or improved neurological status  Description: INTERVENTIONS  - Assess for and report changes in neurological status  - Initiate measures to prevent increased intracranial pressure  - Maintain blood pressure and fluid volume within ordered parameters to optimize cerebral perfusion and minimize risk of hemorrhage  - Monitor temperature, glucose, and sodium. Initiate appropriate interventions as ordered  Outcome: Progressing  Goal: Absence of seizures  Description: INTERVENTIONS  - Monitor for seizure activity  - Administer anti-seizure medications as ordered  - Monitor neurological status  Outcome: Progressing  Goal: Remains free of injury related to seizure activity  Description: INTERVENTIONS:  - Maintain airway, patient safety  and administer oxygen as ordered  - Monitor patient for seizure activity, document  and report duration and description of seizure to MD/LIP  - If seizure occurs, turn patient to side and suction secretions as needed  - Reorient patient post seizure  - Seizure pads on all 4 side rails  - Instruct patient/family to notify RN of any seizure activity  - Instruct patient/family to call for assistance with activity based on assessment  Outcome: Progressing  Goal: Achieves maximal functionality and self care  Description: INTERVENTIONS  - Monitor swallowing and airway patency with patient fatigue and changes in neurological status  - Encourage and assist patient to increase activity and self care with guidance from PT/OT  - Encourage visually impaired, hearing impaired and aphasic patients to use assistive/communication devices  Outcome: Progressing     Problem: RESPIRATORY - ADULT  Goal: Achieves optimal ventilation and oxygenation  Description: INTERVENTIONS:  - Assess for changes in respiratory status  - Assess for changes in mentation and behavior  - Position to facilitate oxygenation and minimize respiratory effort  - Oxygen supplementation based on oxygen saturation or ABGs  - Provide Smoking Cessation handout, if applicable  - Encourage broncho-pulmonary hygiene including cough, deep breathe, Incentive Spirometry  - Assess the need for suctioning and perform as needed  - Assess and instruct to report SOB or any respiratory difficulty  - Respiratory Therapy support as indicated  - Manage/alleviate anxiety  - Monitor for signs/symptoms of CO2 retention  Outcome: Progressing

## 2024-12-11 NOTE — PROGRESS NOTES
NURSING DISCHARGE NOTE    Discharged Home via Wheelchair.  Accompanied by RN  Belongings Taken by patient/family.  PIV removed and site assessed.  Paperwork provided and questions answered.

## 2024-12-11 NOTE — PLAN OF CARE
Problem: PAIN - ADULT  Goal: Verbalizes/displays adequate comfort level or patient's stated pain goal  Description: INTERVENTIONS:  - Encourage pt to monitor pain and request assistance  - Assess pain using appropriate pain scale  - Administer analgesics based on type and severity of pain and evaluate response  - Implement non-pharmacological measures as appropriate and evaluate response  - Consider cultural and social influences on pain and pain management  - Manage/alleviate anxiety  - Utilize distraction and/or relaxation techniques  - Monitor for opioid side effects  - Notify MD/LIP if interventions unsuccessful or patient reports new pain  - Anticipate increased pain with activity and pre-medicate as appropriate  12/11/2024 1346 by Myriam Mitchell RN  Outcome: Adequate for Discharge  12/11/2024 1146 by Myriam Mitchell RN  Outcome: Progressing     Problem: NEUROLOGICAL - ADULT  Goal: Achieves stable or improved neurological status  Description: INTERVENTIONS  - Assess for and report changes in neurological status  - Initiate measures to prevent increased intracranial pressure  - Maintain blood pressure and fluid volume within ordered parameters to optimize cerebral perfusion and minimize risk of hemorrhage  - Monitor temperature, glucose, and sodium. Initiate appropriate interventions as ordered  12/11/2024 1346 by Myriam Mitchell RN  Outcome: Adequate for Discharge  12/11/2024 1146 by Myriam Mitchell RN  Outcome: Progressing  Goal: Absence of seizures  Description: INTERVENTIONS  - Monitor for seizure activity  - Administer anti-seizure medications as ordered  - Monitor neurological status  12/11/2024 1346 by Myriam Mitchell RN  Outcome: Adequate for Discharge  12/11/2024 1146 by Myriam Mitchell RN  Outcome: Progressing  Goal: Remains free of injury related to seizure activity  Description: INTERVENTIONS:  - Maintain airway, patient safety  and administer oxygen as ordered  - Monitor patient for seizure  activity, document and report duration and description of seizure to MD/LIP  - If seizure occurs, turn patient to side and suction secretions as needed  - Reorient patient post seizure  - Seizure pads on all 4 side rails  - Instruct patient/family to notify RN of any seizure activity  - Instruct patient/family to call for assistance with activity based on assessment  12/11/2024 1346 by Myriam Mitchell RN  Outcome: Adequate for Discharge  12/11/2024 1146 by Myriam Mitchell RN  Outcome: Progressing  Goal: Achieves maximal functionality and self care  Description: INTERVENTIONS  - Monitor swallowing and airway patency with patient fatigue and changes in neurological status  - Encourage and assist patient to increase activity and self care with guidance from PT/OT  - Encourage visually impaired, hearing impaired and aphasic patients to use assistive/communication devices  12/11/2024 1346 by Myriam Mitchell RN  Outcome: Adequate for Discharge  12/11/2024 1146 by Myriam Mitchell RN  Outcome: Progressing     Problem: RESPIRATORY - ADULT  Goal: Achieves optimal ventilation and oxygenation  Description: INTERVENTIONS:  - Assess for changes in respiratory status  - Assess for changes in mentation and behavior  - Position to facilitate oxygenation and minimize respiratory effort  - Oxygen supplementation based on oxygen saturation or ABGs  - Provide Smoking Cessation handout, if applicable  - Encourage broncho-pulmonary hygiene including cough, deep breathe, Incentive Spirometry  - Assess the need for suctioning and perform as needed  - Assess and instruct to report SOB or any respiratory difficulty  - Respiratory Therapy support as indicated  - Manage/alleviate anxiety  - Monitor for signs/symptoms of CO2 retention  12/11/2024 1346 by Myriam Mitchell RN  Outcome: Adequate for Discharge  12/11/2024 1146 by Myriam Mitchell RN  Outcome: Progressing

## 2024-12-11 NOTE — PLAN OF CARE
Received pt alert and coherent. SBA with cane to the bathroom. RA. VSS. Denies pain. BRENNEN. NIH daily, seizure prec. PT recomm out patient PT. Poss d/ c. CPAP at [facility]. Safety and fall prec maintained. Bed in lowest position, bed alarm on. POC on-going        Problem: PAIN - ADULT  Goal: Verbalizes/displays adequate comfort level or patient's stated pain goal  Description: INTERVENTIONS:  - Encourage pt to monitor pain and request assistance  - Assess pain using appropriate pain scale  - Administer analgesics based on type and severity of pain and evaluate response  - Implement non-pharmacological measures as appropriate and evaluate response  - Consider cultural and social influences on pain and pain management  - Manage/alleviate anxiety  - Utilize distraction and/or relaxation techniques  - Monitor for opioid side effects  - Notify MD/LIP if interventions unsuccessful or patient reports new pain  - Anticipate increased pain with activity and pre-medicate as appropriate  Outcome: Progressing     Problem: NEUROLOGICAL - ADULT  Goal: Achieves stable or improved neurological status  Description: INTERVENTIONS  - Assess for and report changes in neurological status  - Initiate measures to prevent increased intracranial pressure  - Maintain blood pressure and fluid volume within ordered parameters to optimize cerebral perfusion and minimize risk of hemorrhage  - Monitor temperature, glucose, and sodium. Initiate appropriate interventions as ordered  Outcome: Progressing  Goal: Absence of seizures  Description: INTERVENTIONS  - Monitor for seizure activity  - Administer anti-seizure medications as ordered  - Monitor neurological status  Outcome: Progressing  Goal: Remains free of injury related to seizure activity  Description: INTERVENTIONS:  - Maintain airway, patient safety  and administer oxygen as ordered  - Monitor patient for seizure activity, document and report duration and description of seizure to MD/LIP  - If  seizure occurs, turn patient to side and suction secretions as needed  - Reorient patient post seizure  - Seizure pads on all 4 side rails  - Instruct patient/family to notify RN of any seizure activity  - Instruct patient/family to call for assistance with activity based on assessment  Outcome: Progressing  Goal: Achieves maximal functionality and self care  Description: INTERVENTIONS  - Monitor swallowing and airway patency with patient fatigue and changes in neurological status  - Encourage and assist patient to increase activity and self care with guidance from PT/OT  - Encourage visually impaired, hearing impaired and aphasic patients to use assistive/communication devices  Outcome: Progressing     Problem: RESPIRATORY - ADULT  Goal: Achieves optimal ventilation and oxygenation  Description: INTERVENTIONS:  - Assess for changes in respiratory status  - Assess for changes in mentation and behavior  - Position to facilitate oxygenation and minimize respiratory effort  - Oxygen supplementation based on oxygen saturation or ABGs  - Provide Smoking Cessation handout, if applicable  - Encourage broncho-pulmonary hygiene including cough, deep breathe, Incentive Spirometry  - Assess the need for suctioning and perform as needed  - Assess and instruct to report SOB or any respiratory difficulty  - Respiratory Therapy support as indicated  - Manage/alleviate anxiety  - Monitor for signs/symptoms of CO2 retention  Outcome: Progressing

## 2024-12-12 ENCOUNTER — APPOINTMENT (OUTPATIENT)
Dept: PET IMAGING | Age: 59
End: 2024-12-12
Attending: INTERNAL MEDICINE

## 2024-12-12 ENCOUNTER — E-VISIT (OUTPATIENT)
Dept: TELEHEALTH | Age: 59
End: 2024-12-12
Payer: COMMERCIAL

## 2024-12-12 DIAGNOSIS — M54.50 LOW BACK PAIN, UNSPECIFIED BACK PAIN LATERALITY, UNSPECIFIED CHRONICITY, UNSPECIFIED WHETHER SCIATICA PRESENT: Primary | ICD-10-CM

## 2024-12-12 LAB
ATRIAL RATE: 89 BPM
P AXIS: 69 DEGREES
P-R INTERVAL: 148 MS
Q-T INTERVAL: 360 MS
QRS DURATION: 98 MS
QTC CALCULATION (BEZET): 438 MS
R AXIS: -4 DEGREES
T AXIS: 41 DEGREES
VENTRICULAR RATE: 89 BPM

## 2024-12-12 NOTE — PROGRESS NOTES
Pt. With recent ED visit, had [employer], now with back pain with radiation, possible weakness.  Recommend follow up with PCP today, or if unable, return to ED.

## 2024-12-12 NOTE — DISCHARGE SUMMARY
[place] HOSPITALIST  DISCHARGE SUMMARY     Alonzo Quevedo Patient Status:  Observation    3/21/1965 MRN [id number]   Location [facility] 3NE-A Attending No att. providers found   Hosp Day # 0 PCP Bruce Bernheim, MD     Date of Admission: 2024  Date of Discharge: 2024    Discharge Disposition: Home or Self Care    Admitting Diagnosis:   Other headache syndrome [G44.89]  Fever, unspecified fever cause [R50.9]  Altered mental status, unspecified altered mental status type [R41.82]    Hospital Discharge Diagnoses:  #Acute encephalopathy - unclear etiology. States he had a history of a seizure. That said he admits to very busy traveling and stopped his medications due to driving long hours. He had severe sleep deprivation due to current situation and stopping medications. Therefore he took all his meds at once last night- (tramadol, gabapentin, requip, and rizatriptan).  He states he was so tired he was having trouble talking after taking his meds and bit his tongue   -LP does not speak to meningitis  -Hold on further abx  -MRI brain reviewed- defer to Neurology   -EEG results per Neuro   -Seizure precautions  -Neurology consulted  -limit meds but will give Rizatriptan for HA     #Asthma exacerbation  -Prednisone burst- no need for further tapering dose- no wheezing or SOB      #Essential hypertension-Losartan     #Restless leg syndrome- Requip only as needed      #BPH-Flomax     #History of VTE  #History of lymphoma  #Fibromyalgia  #SHAWN  #Hypokalemia- replaced      DC planning, d/w patient regarding medications and timing, usage. Pt understands and will limit accordingly.      Lace+ Score: 40  59-90 High Risk  29-58 Medium Risk  0-28   Low Risk.     Brief Synopsis: Patient extensive workup and ruled out acute stroke.  MRI had scattered FLAIR signal suggesting chronic microvascular changes and patient has a history of chronic migraines.  Headache improved throughout the hospital stay.  Patient's mental  status returned to baseline.  With his clinical history patient seemed to have encephalopathy from overdosing of medications all at 1 time.  EEG was unimpressive and patient was cleared for discharge.  Patient discharged in stable condition but warrants close outpatient follow-up with his physician.    Procedures during hospitalization:   EEG    Lab/Test results pending at Discharge:   None    Consultants:  Neurology    Discharge Medication List:     Discharge Medications        CHANGE how you take these medications        Instructions Prescription details   gabapentin 300 MG Caps  Commonly known as: Neurontin  What changed: when to take this      Take 1 capsule (300 mg total) by mouth 2 (two) times daily.   Refills: 0            CONTINUE taking these medications        Instructions Prescription details   albuterol 108 (90 Base) MCG/ACT Aers  Commonly known as: Ventolin HFA      Inhale 2 puffs into the lungs every 4 (four) hours as needed for Wheezing or Shortness of Breath.   Refills: 0     Dupixent 300 MG/2ML Sopn  Generic drug: Dupilumab      Inject 300 mg into the skin every 14 (fourteen) days.   Refills: 0     losartan 50 MG Tabs  Commonly known as: Cozaar      Take 1 tablet (50 mg total) by mouth daily.   Refills: 0     modafinil 200 MG Tabs  Commonly known as: PROVIGIL      Take 1 tablet (200 mg total) by mouth daily.   Refills: 0     NON FORMULARY      Take 2 capsules by mouth every evening. Nutrafol (hair supplement)   Refills: 0     OMEGA 3 OR      Take 2 capsules by mouth every morning.   Refills: 0     rOPINIRole 2 MG Tabs  Commonly known as: Requip      Take 1 tablet (2 mg total) by mouth nightly.   Refills: 0     tamsulosin 0.4 MG Caps  Commonly known as: Flomax      Take 1 capsule (0.4 mg total) by mouth daily.   Refills: 0     traMADol 50 MG Tabs  Commonly known as: Ultram      Take 1 tablet (50 mg total) by mouth 2 (two) times daily.   Refills: 0              Follow-up appointment:   Bernheim,  MD Hilario  [address] [facility] [address]  [phone]    Follow up in 1 week(s)        -----------------------------------------------------------------------------------------------  PATIENT DISCHARGE INSTRUCTIONS: See electronic chart    Scott Nuñez MD 12/12/2024    Time spent: 33 minutes

## 2024-12-13 ENCOUNTER — TELEPHONE (OUTPATIENT)
Dept: CASE MANAGEMENT | Facility: HOSPITAL | Age: 59
End: 2024-12-13

## 2024-12-13 NOTE — CM/SW NOTE
Patient's symptoms are not improving.    Explained EDCM cannot give out medical advice over the phone.  Recommended patient call his PCP and/or return to the ED for re-evaluation.  Patient verbalized understanding.

## 2024-12-17 ENCOUNTER — APPOINTMENT (OUTPATIENT)
Dept: NEUROLOGY | Age: 59
End: 2024-12-17

## 2024-12-17 ENCOUNTER — TELEPHONE (OUTPATIENT)
Dept: NEUROLOGY | Age: 59
End: 2024-12-17

## 2024-12-17 ENCOUNTER — TELEPHONE (OUTPATIENT)
Dept: FAMILY MEDICINE | Age: 59
End: 2024-12-17

## 2024-12-17 ENCOUNTER — E-ADVICE (OUTPATIENT)
Dept: NEUROLOGY | Age: 59
End: 2024-12-17

## 2024-12-17 VITALS
WEIGHT: 168.32 LBS | BODY MASS INDEX: 27.05 KG/M2 | HEART RATE: 73 BPM | DIASTOLIC BLOOD PRESSURE: 86 MMHG | SYSTOLIC BLOOD PRESSURE: 153 MMHG | HEIGHT: 66 IN | OXYGEN SATURATION: 100 %

## 2024-12-17 DIAGNOSIS — H90.3 BILATERAL SENSORINEURAL HEARING LOSS: ICD-10-CM

## 2024-12-17 DIAGNOSIS — M79.7 FIBROMYALGIA: ICD-10-CM

## 2024-12-17 DIAGNOSIS — G43.019 INTRACTABLE MIGRAINE WITHOUT AURA AND WITHOUT STATUS MIGRAINOSUS: ICD-10-CM

## 2024-12-17 DIAGNOSIS — Z91.148 POOR COMPLIANCE WITH MEDICATION: ICD-10-CM

## 2024-12-17 DIAGNOSIS — G25.81 RESTLESS LEGS: ICD-10-CM

## 2024-12-17 DIAGNOSIS — R41.3 MEMORY LOSS: ICD-10-CM

## 2024-12-17 DIAGNOSIS — G97.1 POST LUMBAR PUNCTURE HEADACHE: Primary | ICD-10-CM

## 2024-12-17 DIAGNOSIS — G47.33 OBSTRUCTIVE SLEEP APNEA: ICD-10-CM

## 2024-12-17 DIAGNOSIS — G97.1 HEADACHE FOLLOWING LUMBAR PUNCTURE: Primary | ICD-10-CM

## 2024-12-17 PROCEDURE — 3079F DIAST BP 80-89 MM HG: CPT | Performed by: PSYCHIATRY & NEUROLOGY

## 2024-12-17 PROCEDURE — 99214 OFFICE O/P EST MOD 30 MIN: CPT | Performed by: PSYCHIATRY & NEUROLOGY

## 2024-12-17 PROCEDURE — 3077F SYST BP >= 140 MM HG: CPT | Performed by: PSYCHIATRY & NEUROLOGY

## 2024-12-18 ENCOUNTER — TELEPHONE (OUTPATIENT)
Dept: PAIN MANAGEMENT | Age: 59
End: 2024-12-18

## 2024-12-18 ENCOUNTER — TELEPHONE (OUTPATIENT)
Dept: NEUROLOGY | Age: 59
End: 2024-12-18

## 2024-12-19 ENCOUNTER — TELEPHONE (OUTPATIENT)
Dept: PAIN MANAGEMENT | Age: 59
End: 2024-12-19

## 2024-12-20 ENCOUNTER — OFFICE VISIT (OUTPATIENT)
Dept: HEMATOLOGY/ONCOLOGY | Age: 59
End: 2024-12-20
Attending: INTERNAL MEDICINE

## 2024-12-20 VITALS
TEMPERATURE: 97.1 F | RESPIRATION RATE: 16 BRPM | SYSTOLIC BLOOD PRESSURE: 160 MMHG | WEIGHT: 168.43 LBS | DIASTOLIC BLOOD PRESSURE: 90 MMHG | OXYGEN SATURATION: 99 % | BODY MASS INDEX: 27.19 KG/M2 | HEART RATE: 83 BPM

## 2024-12-20 DIAGNOSIS — C82.03 FOLLICULAR LYMPHOMA GRADE I OF INTRA-ABDOMINAL LYMPH NODES  (CMD): Primary | ICD-10-CM

## 2024-12-20 PROCEDURE — 3077F SYST BP >= 140 MM HG: CPT | Performed by: INTERNAL MEDICINE

## 2024-12-20 PROCEDURE — 99211 OFF/OP EST MAY X REQ PHY/QHP: CPT

## 2024-12-20 PROCEDURE — 99214 OFFICE O/P EST MOD 30 MIN: CPT | Performed by: INTERNAL MEDICINE

## 2024-12-20 ASSESSMENT — PAIN SCALES - GENERAL: PAINLEVEL: 8

## 2024-12-20 ASSESSMENT — PATIENT HEALTH QUESTIONNAIRE - PHQ9
1. LITTLE INTEREST OR PLEASURE IN DOING THINGS: NOT AT ALL
SUM OF ALL RESPONSES TO PHQ9 QUESTIONS 1 AND 2: 0
2. FEELING DOWN, DEPRESSED OR HOPELESS: NOT AT ALL
CLINICAL INTERPRETATION OF PHQ2 SCORE: NO FURTHER SCREENING NEEDED
SUM OF ALL RESPONSES TO PHQ9 QUESTIONS 1 AND 2: 0

## 2024-12-27 ENCOUNTER — CLINICAL DOCUMENTATION (OUTPATIENT)
Dept: GERIATRIC MEDICINE | Age: 59
End: 2024-12-27

## 2024-12-27 DIAGNOSIS — C85.93 LYMPHOMA OF INTRA-ABDOMINAL LYMPH NODES, UNSPECIFIED LYMPHOMA TYPE  (CMD): Primary | ICD-10-CM

## 2024-12-27 DIAGNOSIS — C92.01 ACUTE MYELOID LEUKEMIA IN REMISSION  (CMD): ICD-10-CM

## 2024-12-27 DIAGNOSIS — C82.93 FOLLICULAR LYMPHOMA OF INTRA-ABDOMINAL LYMPH NODES, UNSPECIFIED FOLLICULAR LYMPHOMA TYPE (CMD): ICD-10-CM

## 2024-12-27 DIAGNOSIS — Z85.6 HISTORY OF ACUTE MYELOID LEUKEMIA: ICD-10-CM

## 2024-12-30 DIAGNOSIS — I10 BENIGN ESSENTIAL HYPERTENSION: ICD-10-CM

## 2024-12-31 ENCOUNTER — CLINICAL DOCUMENTATION (OUTPATIENT)
Dept: GERIATRIC MEDICINE | Age: 59
End: 2024-12-31

## 2024-12-31 PROBLEM — Z00.00 WELCOME TO MEDICARE PREVENTIVE VISIT: Status: ACTIVE | Noted: 2024-12-31

## 2024-12-31 RX ORDER — LOSARTAN POTASSIUM 50 MG/1
50 TABLET ORAL DAILY
Qty: 90 TABLET | Refills: 1 | Status: SHIPPED | OUTPATIENT
Start: 2024-12-31

## 2025-01-08 ENCOUNTER — APPOINTMENT (OUTPATIENT)
Dept: INTERNAL MEDICINE | Age: 60
End: 2025-01-08

## 2025-01-08 ENCOUNTER — CLINICAL DOCUMENTATION (OUTPATIENT)
Dept: GERIATRIC MEDICINE | Age: 60
End: 2025-01-08

## 2025-01-08 ENCOUNTER — LAB SERVICES (OUTPATIENT)
Dept: LAB | Age: 60
End: 2025-01-08

## 2025-01-08 VITALS
WEIGHT: 170.4 LBS | BODY MASS INDEX: 27.38 KG/M2 | DIASTOLIC BLOOD PRESSURE: 80 MMHG | HEIGHT: 66 IN | TEMPERATURE: 96.7 F | HEART RATE: 72 BPM | SYSTOLIC BLOOD PRESSURE: 118 MMHG | RESPIRATION RATE: 16 BRPM

## 2025-01-08 DIAGNOSIS — N40.1 BPH WITH OBSTRUCTION/LOWER URINARY TRACT SYMPTOMS: ICD-10-CM

## 2025-01-08 DIAGNOSIS — F32.0 MILD MAJOR DEPRESSION (CMD): ICD-10-CM

## 2025-01-08 DIAGNOSIS — N13.8 BPH WITH OBSTRUCTION/LOWER URINARY TRACT SYMPTOMS: ICD-10-CM

## 2025-01-08 DIAGNOSIS — G89.4 CHRONIC PAIN SYNDROME: ICD-10-CM

## 2025-01-08 DIAGNOSIS — D12.6 TUBULAR ADENOMA OF COLON: ICD-10-CM

## 2025-01-08 DIAGNOSIS — I10 BENIGN ESSENTIAL HYPERTENSION: ICD-10-CM

## 2025-01-08 DIAGNOSIS — N20.0 NEPHROLITHIASIS: ICD-10-CM

## 2025-01-08 DIAGNOSIS — M54.32 SCIATICA OF LEFT SIDE: ICD-10-CM

## 2025-01-08 DIAGNOSIS — H90.5 SENSORINEURAL HEARING LOSS (SNHL), UNSPECIFIED LATERALITY: ICD-10-CM

## 2025-01-08 DIAGNOSIS — C82.03 FOLLICULAR LYMPHOMA GRADE I OF INTRA-ABDOMINAL LYMPH NODES  (CMD): ICD-10-CM

## 2025-01-08 DIAGNOSIS — C92.01 ACUTE MYELOID LEUKEMIA IN REMISSION  (CMD): ICD-10-CM

## 2025-01-08 DIAGNOSIS — E66.3 OVERWEIGHT WITH BODY MASS INDEX (BMI) OF 27 TO 27.9 IN ADULT: ICD-10-CM

## 2025-01-08 DIAGNOSIS — G25.81 RESTLESS LEGS SYNDROME: ICD-10-CM

## 2025-01-08 DIAGNOSIS — R31.21 ASYMPTOMATIC MICROSCOPIC HEMATURIA: ICD-10-CM

## 2025-01-08 DIAGNOSIS — M54.50 BILATERAL LOW BACK PAIN WITHOUT SCIATICA, UNSPECIFIED CHRONICITY: ICD-10-CM

## 2025-01-08 DIAGNOSIS — M79.7 FIBROMYALGIA: ICD-10-CM

## 2025-01-08 DIAGNOSIS — C92.01 ACUTE MYELOID LEUKEMIA IN REMISSION  (CMD): Primary | ICD-10-CM

## 2025-01-08 DIAGNOSIS — R41.82 ALTERED MENTAL STATUS, UNSPECIFIED ALTERED MENTAL STATUS TYPE: ICD-10-CM

## 2025-01-08 DIAGNOSIS — J45.20 MILD INTERMITTENT ASTHMA WITHOUT COMPLICATION (CMD): ICD-10-CM

## 2025-01-08 DIAGNOSIS — M48.061 SPINAL STENOSIS OF LUMBAR REGION, UNSPECIFIED WHETHER NEUROGENIC CLAUDICATION PRESENT: ICD-10-CM

## 2025-01-08 DIAGNOSIS — T45.1X5A POLYNEUROPATHY FOLLOWING CHEMOTHERAPY  (CMD): ICD-10-CM

## 2025-01-08 DIAGNOSIS — G47.33 OBSTRUCTIVE SLEEP APNEA: ICD-10-CM

## 2025-01-08 DIAGNOSIS — G62.0 POLYNEUROPATHY FOLLOWING CHEMOTHERAPY  (CMD): ICD-10-CM

## 2025-01-08 DIAGNOSIS — Z00.00 WELCOME TO MEDICARE PREVENTIVE VISIT: ICD-10-CM

## 2025-01-08 PROBLEM — R55 SYNCOPE: Status: RESOLVED | Noted: 2024-12-10 | Resolved: 2025-01-08

## 2025-01-08 PROCEDURE — 80061 LIPID PANEL: CPT | Performed by: CLINICAL MEDICAL LABORATORY

## 2025-01-08 PROCEDURE — 36415 COLL VENOUS BLD VENIPUNCTURE: CPT | Performed by: INTERNAL MEDICINE

## 2025-01-08 PROCEDURE — G0402 INITIAL PREVENTIVE EXAM: HCPCS | Performed by: INTERNAL MEDICINE

## 2025-01-08 PROCEDURE — 81001 URINALYSIS AUTO W/SCOPE: CPT | Performed by: CLINICAL MEDICAL LABORATORY

## 2025-01-08 PROCEDURE — 83036 HEMOGLOBIN GLYCOSYLATED A1C: CPT | Performed by: CLINICAL MEDICAL LABORATORY

## 2025-01-08 PROCEDURE — 99215 OFFICE O/P EST HI 40 MIN: CPT | Performed by: INTERNAL MEDICINE

## 2025-01-08 PROCEDURE — 80050 GENERAL HEALTH PANEL: CPT | Performed by: CLINICAL MEDICAL LABORATORY

## 2025-01-08 PROCEDURE — 3079F DIAST BP 80-89 MM HG: CPT | Performed by: INTERNAL MEDICINE

## 2025-01-08 PROCEDURE — 84153 ASSAY OF PSA TOTAL: CPT | Performed by: CLINICAL MEDICAL LABORATORY

## 2025-01-08 PROCEDURE — 3074F SYST BP LT 130 MM HG: CPT | Performed by: INTERNAL MEDICINE

## 2025-01-08 RX ORDER — CYCLOBENZAPRINE HCL 10 MG
TABLET ORAL
Qty: 50 TABLET | Refills: 1 | Status: SHIPPED | OUTPATIENT
Start: 2025-01-08

## 2025-01-08 ASSESSMENT — ENCOUNTER SYMPTOMS
SORE THROAT: 0
SHORTNESS OF BREATH: 0
PHOTOPHOBIA: 0
TREMORS: 0
TROUBLE SWALLOWING: 0
ABDOMINAL PAIN: 0
CONSTIPATION: 0
FACIAL SWELLING: 0
SINUS PRESSURE: 0
CONFUSION: 0
CHEST TIGHTNESS: 0
EYE DISCHARGE: 0
HEADACHES: 0
BRUISES/BLEEDS EASILY: 0
RECTAL PAIN: 0
NUMBNESS: 0
DIARRHEA: 0
VOMITING: 0
FATIGUE: 0
SEIZURES: 0
SLEEP DISTURBANCE: 0
EYE PAIN: 0
FEVER: 0
ADENOPATHY: 0

## 2025-01-08 ASSESSMENT — PATIENT HEALTH QUESTIONNAIRE - PHQ9
2. FEELING DOWN, DEPRESSED OR HOPELESS: NOT AT ALL
SUM OF ALL RESPONSES TO PHQ9 QUESTIONS 1 AND 2: 0
1. LITTLE INTEREST OR PLEASURE IN DOING THINGS: NOT AT ALL
1. LITTLE INTEREST OR PLEASURE IN DOING THINGS: NOT AT ALL
CLINICAL INTERPRETATION OF PHQ2 SCORE: NO FURTHER SCREENING NEEDED
CLINICAL INTERPRETATION OF PHQ2 SCORE: NO FURTHER SCREENING NEEDED
SUM OF ALL RESPONSES TO PHQ9 QUESTIONS 1 AND 2: 0
2. FEELING DOWN, DEPRESSED OR HOPELESS: NOT AT ALL
SUM OF ALL RESPONSES TO PHQ9 QUESTIONS 1 AND 2: 0
SUM OF ALL RESPONSES TO PHQ9 QUESTIONS 1 AND 2: 0

## 2025-01-08 ASSESSMENT — VISUAL ACUITY
OD_CC: 20/20
OS_CC: 20/20

## 2025-01-08 ASSESSMENT — MINI COG
PATIENT ABLE TO FILL IN THE CLOCK FACE WITH 10 MINUTES PAST 11 O'CLOCK?: YES, CLOCK IS CORRECT
PATIENT ABLE TO REPEAT THE 3 WORDS GIVEN PREVIOUSLY?: WAS ABLE TO REPEAT BACK 2 WORDS CORRECTLY
TOTAL SCORE: 4
PATIENT WAS GIVEN REPEAT BACK WORDS FROM VERSION: 1 - BANANA SUNRISE CHAIR

## 2025-01-08 ASSESSMENT — ACTIVITIES OF DAILY LIVING (ADL)
RECENT_DECLINE_ADL: NO
BATHING: INDEPENDENT
CONTINENCE: INDEPENDENT
DRESSING: INDEPENDENT
FEEDING: INDEPENDENT
MOBILITY_ASSIST_DEVICES: CANE;FRONT-WHEELED WALKER
SENSORY_SUPPORT_DEVICES: EYEGLASSES;HEARING AIDS
NEEDS_ASSIST: NO
ADL_SHORT_OF_BREATH: NO
ADL_SCORE: 12
TRANSFERRING: INDEPENDENT
TOILETING: INDEPENDENT

## 2025-01-08 ASSESSMENT — PAIN SCALES - GENERAL: PAINLEVEL: 0

## 2025-01-09 ENCOUNTER — CLINICAL DOCUMENTATION (OUTPATIENT)
Dept: GERIATRIC MEDICINE | Age: 60
End: 2025-01-09

## 2025-01-09 DIAGNOSIS — R31.21 ASYMPTOMATIC MICROSCOPIC HEMATURIA: ICD-10-CM

## 2025-01-09 DIAGNOSIS — E66.3 OVERWEIGHT WITH BODY MASS INDEX (BMI) OF 27 TO 27.9 IN ADULT: Primary | ICD-10-CM

## 2025-01-09 DIAGNOSIS — E78.2 MIXED HYPERLIPIDEMIA: ICD-10-CM

## 2025-01-09 PROBLEM — E78.5 HYPERLIPIDEMIA: Status: ACTIVE | Noted: 2025-01-09

## 2025-01-09 LAB
ALBUMIN SERPL-MCNC: 4 G/DL (ref 3.4–5)
ALBUMIN/GLOB SERPL: 1.4 {RATIO} (ref 1–2.4)
ALP SERPL-CCNC: 115 UNITS/L (ref 45–117)
ALT SERPL-CCNC: 32 UNITS/L
ANION GAP SERPL CALC-SCNC: 10 MMOL/L (ref 7–19)
APPEARANCE UR: ABNORMAL
AST SERPL-CCNC: 26 UNITS/L
BACTERIA #/AREA URNS HPF: ABNORMAL /HPF
BASOPHILS # BLD: 0.1 K/MCL (ref 0–0.3)
BASOPHILS NFR BLD: 1 %
BILIRUB SERPL-MCNC: 1.7 MG/DL (ref 0.2–1)
BILIRUB UR QL STRIP: NEGATIVE
BUN SERPL-MCNC: 12 MG/DL (ref 6–20)
BUN/CREAT SERPL: 16 (ref 7–25)
CALCIUM SERPL-MCNC: 9.1 MG/DL (ref 8.4–10.2)
CHLORIDE SERPL-SCNC: 106 MMOL/L (ref 97–110)
CHOLEST SERPL-MCNC: 213 MG/DL
CHOLEST/HDLC SERPL: 3.2 {RATIO}
CO2 SERPL-SCNC: 28 MMOL/L (ref 21–32)
COLOR UR: YELLOW
CREAT SERPL-MCNC: 0.76 MG/DL (ref 0.67–1.17)
DEPRECATED RDW RBC: 43.3 FL (ref 39–50)
EGFRCR SERPLBLD CKD-EPI 2021: >90 ML/MIN/{1.73_M2}
EOSINOPHIL # BLD: 0.2 K/MCL (ref 0–0.5)
EOSINOPHIL NFR BLD: 3 %
ERYTHROCYTE [DISTWIDTH] IN BLOOD: 13.4 % (ref 11–15)
FASTING DURATION TIME PATIENT: 12 HOURS (ref 0–999)
GLOBULIN SER-MCNC: 2.8 G/DL (ref 2–4)
GLUCOSE SERPL-MCNC: 83 MG/DL (ref 70–99)
GLUCOSE UR STRIP-MCNC: NEGATIVE MG/DL
HBA1C MFR BLD: 5.3 % (ref 4.5–5.6)
HCT VFR BLD CALC: 45.4 % (ref 39–51)
HDLC SERPL-MCNC: 66 MG/DL
HGB BLD-MCNC: 15.4 G/DL (ref 13–17)
HGB UR QL STRIP: NEGATIVE
HYALINE CASTS #/AREA URNS LPF: ABNORMAL /LPF
IMM GRANULOCYTES # BLD AUTO: 0 K/MCL (ref 0–0.2)
IMM GRANULOCYTES # BLD: 0 %
KETONES UR STRIP-MCNC: 20 MG/DL
LDLC SERPL CALC-MCNC: 134 MG/DL
LEUKOCYTE ESTERASE UR QL STRIP: NEGATIVE
LYMPHOCYTES # BLD: 1.5 K/MCL (ref 1–4)
LYMPHOCYTES NFR BLD: 21 %
MCH RBC QN AUTO: 29.8 PG (ref 26–34)
MCHC RBC AUTO-ENTMCNC: 33.9 G/DL (ref 32–36.5)
MCV RBC AUTO: 88 FL (ref 78–100)
MONOCYTES # BLD: 0.5 K/MCL (ref 0.3–0.9)
MONOCYTES NFR BLD: 6 %
MUCOUS THREADS URNS QL MICRO: PRESENT
NEUTROPHILS # BLD: 5 K/MCL (ref 1.8–7.7)
NEUTROPHILS NFR BLD: 69 %
NITRITE UR QL STRIP: NEGATIVE
NONHDLC SERPL-MCNC: 147 MG/DL
NRBC BLD MANUAL-RTO: 0 /100 WBC
PH UR STRIP: 5.5 [PH] (ref 5–7)
PLATELET # BLD AUTO: 150 K/MCL (ref 140–450)
POTASSIUM SERPL-SCNC: 3.8 MMOL/L (ref 3.4–5.1)
PROT SERPL-MCNC: 6.8 G/DL (ref 6.4–8.2)
PROT UR STRIP-MCNC: ABNORMAL MG/DL
PSA SERPL-MCNC: 1.86 NG/ML
RBC # BLD: 5.16 MIL/MCL (ref 4.5–5.9)
RBC #/AREA URNS HPF: ABNORMAL /HPF
SODIUM SERPL-SCNC: 140 MMOL/L (ref 135–145)
SP GR UR STRIP: 1.03 (ref 1–1.03)
SQUAMOUS #/AREA URNS HPF: ABNORMAL /HPF
TRIGL SERPL-MCNC: 64 MG/DL
TSH SERPL-ACNC: 0.64 MCUNITS/ML (ref 0.35–5)
UROBILINOGEN UR STRIP-MCNC: 0.2 MG/DL
WBC # BLD: 7.3 K/MCL (ref 4.2–11)
WBC #/AREA URNS HPF: ABNORMAL /HPF

## 2025-03-06 DIAGNOSIS — M50.30 DDD (DEGENERATIVE DISC DISEASE), CERVICAL: ICD-10-CM

## 2025-03-06 DIAGNOSIS — M17.0 PRIMARY OSTEOARTHRITIS OF BOTH KNEES: ICD-10-CM

## 2025-03-07 RX ORDER — ROPINIROLE 2 MG/1
2 TABLET, FILM COATED ORAL NIGHTLY
Qty: 90 TABLET | Refills: 3 | Status: SHIPPED | OUTPATIENT
Start: 2025-03-07

## 2025-03-07 RX ORDER — TRAMADOL HYDROCHLORIDE 50 MG/1
50 TABLET ORAL 2 TIMES DAILY
Qty: 60 TABLET | Refills: 0 | Status: SHIPPED | OUTPATIENT
Start: 2025-03-07

## 2025-07-04 SDOH — ECONOMIC STABILITY: HOUSING INSECURITY: DO YOU HAVE PROBLEMS WITH ANY OF THE FOLLOWING?: WATER LEAKS

## 2025-07-04 SDOH — ECONOMIC STABILITY: FOOD INSECURITY: WITHIN THE PAST 12 MONTHS, THE FOOD YOU BOUGHT JUST DIDN'T LAST AND YOU DIDN'T HAVE MONEY TO GET MORE.: PATIENT DECLINED

## 2025-07-04 SDOH — ECONOMIC STABILITY: HOUSING INSECURITY: WHAT IS YOUR LIVING SITUATION TODAY?: I HAVE A PLACE TO LIVE TODAY, BUT I AM WORRIED ABOUT LOSING IT IN THE FUTURE

## 2025-07-04 SDOH — ECONOMIC STABILITY: TRANSPORTATION INSECURITY
IN THE PAST 12 MONTHS, HAS LACK OF RELIABLE TRANSPORTATION KEPT YOU FROM MEDICAL APPOINTMENTS, MEETINGS, WORK OR FROM GETTING THINGS NEEDED FOR DAILY LIVING?: YES

## 2025-07-04 ASSESSMENT — SOCIAL DETERMINANTS OF HEALTH (SDOH): IN THE PAST 12 MONTHS, HAS THE ELECTRIC, GAS, OIL, OR WATER COMPANY THREATENED TO SHUT OFF SERVICE IN YOUR HOME?: NO

## 2025-07-08 ENCOUNTER — APPOINTMENT (OUTPATIENT)
Dept: INTERNAL MEDICINE | Age: 60
End: 2025-07-08

## 2025-07-08 VITALS
TEMPERATURE: 96.7 F | HEART RATE: 74 BPM | DIASTOLIC BLOOD PRESSURE: 70 MMHG | SYSTOLIC BLOOD PRESSURE: 126 MMHG | WEIGHT: 174.6 LBS | HEIGHT: 66 IN | BODY MASS INDEX: 28.06 KG/M2

## 2025-07-08 DIAGNOSIS — R10.32 LEFT LOWER QUADRANT ABDOMINAL PAIN: ICD-10-CM

## 2025-07-08 DIAGNOSIS — N13.8 BPH WITH OBSTRUCTION/LOWER URINARY TRACT SYMPTOMS: ICD-10-CM

## 2025-07-08 DIAGNOSIS — Z23 NEED FOR COVID-19 VACCINE: ICD-10-CM

## 2025-07-08 DIAGNOSIS — E66.3 OVERWEIGHT WITH BODY MASS INDEX (BMI) OF 28 TO 28.9 IN ADULT: ICD-10-CM

## 2025-07-08 DIAGNOSIS — C92.01 ACUTE MYELOID LEUKEMIA IN REMISSION  (CMD): ICD-10-CM

## 2025-07-08 DIAGNOSIS — G47.33 OBSTRUCTIVE SLEEP APNEA: ICD-10-CM

## 2025-07-08 DIAGNOSIS — I10 BENIGN ESSENTIAL HYPERTENSION: ICD-10-CM

## 2025-07-08 DIAGNOSIS — T45.1X5A POLYNEUROPATHY FOLLOWING CHEMOTHERAPY  (CMD): ICD-10-CM

## 2025-07-08 DIAGNOSIS — D12.6 TUBULAR ADENOMA OF COLON: ICD-10-CM

## 2025-07-08 DIAGNOSIS — R31.21 ASYMPTOMATIC MICROSCOPIC HEMATURIA: ICD-10-CM

## 2025-07-08 DIAGNOSIS — Z23 NEED FOR PROPHYLACTIC VACCINATION WITH COMBINED DIPHTHERIA-TETANUS-PERTUSSIS (DTP) VACCINE: ICD-10-CM

## 2025-07-08 DIAGNOSIS — H90.5 SENSORINEURAL HEARING LOSS (SNHL), UNSPECIFIED LATERALITY: ICD-10-CM

## 2025-07-08 DIAGNOSIS — E78.2 MIXED HYPERLIPIDEMIA: ICD-10-CM

## 2025-07-08 DIAGNOSIS — G62.0 POLYNEUROPATHY FOLLOWING CHEMOTHERAPY  (CMD): ICD-10-CM

## 2025-07-08 DIAGNOSIS — J45.20 MILD INTERMITTENT ASTHMA WITHOUT COMPLICATION (CMD): ICD-10-CM

## 2025-07-08 DIAGNOSIS — K57.92 ACUTE DIVERTICULITIS: Primary | ICD-10-CM

## 2025-07-08 DIAGNOSIS — N40.1 BPH WITH OBSTRUCTION/LOWER URINARY TRACT SYMPTOMS: ICD-10-CM

## 2025-07-08 DIAGNOSIS — R91.1 PULMONARY NODULE: ICD-10-CM

## 2025-07-08 DIAGNOSIS — N20.0 NEPHROLITHIASIS: ICD-10-CM

## 2025-07-08 ASSESSMENT — ENCOUNTER SYMPTOMS
BRUISES/BLEEDS EASILY: 0
CONSTIPATION: 0
CHEST TIGHTNESS: 0
DIARRHEA: 0
TREMORS: 0
FATIGUE: 0
EYE DISCHARGE: 0
FEVER: 0
SINUS PRESSURE: 0
SLEEP DISTURBANCE: 0
RECTAL PAIN: 0
PHOTOPHOBIA: 0
TROUBLE SWALLOWING: 0
EYE PAIN: 0
CONFUSION: 0
SHORTNESS OF BREATH: 0
NUMBNESS: 0
FACIAL SWELLING: 0
SORE THROAT: 0
VOMITING: 0
ADENOPATHY: 0
SEIZURES: 0
HEADACHES: 0
ABDOMINAL PAIN: 0

## 2025-07-08 ASSESSMENT — PATIENT HEALTH QUESTIONNAIRE - PHQ9
SUM OF ALL RESPONSES TO PHQ9 QUESTIONS 1 AND 2: 2
CLINICAL INTERPRETATION OF PHQ2 SCORE: NO FURTHER SCREENING NEEDED
SUM OF ALL RESPONSES TO PHQ9 QUESTIONS 1 AND 2: 2
2. FEELING DOWN, DEPRESSED OR HOPELESS: SEVERAL DAYS
1. LITTLE INTEREST OR PLEASURE IN DOING THINGS: SEVERAL DAYS

## 2025-07-08 ASSESSMENT — PAIN SCALES - GENERAL: PAINLEVEL_OUTOF10: 6

## (undated) DEVICE — SOLUTION IRRIG 1000ML ST H2O AQUALITE PLAS

## (undated) DEVICE — SEAL Y BIOPSY PORT P6R SCOPE

## (undated) DEVICE — Device

## (undated) DEVICE — CATHETER URET 5FR L70CM FLX OPN TIP NONPORTED

## (undated) DEVICE — 365 SINGLE USE LASER FIBER

## (undated) DEVICE — 1.9FR NITINOL TIPLESS BSKT

## (undated) DEVICE — NITINOL WIRE ANGLED 035

## (undated) DEVICE — SLEEVE COMPR MD KNEE LEN SGL USE KENDALL SCD

## (undated) DEVICE — HYDROGEL COATED URETERAL DILATOR: Brand: NOTTINGHAM ONE-STEP

## (undated) DEVICE — CYSTO CDS-LF: Brand: MEDLINE INDUSTRIES, INC.

## (undated) DEVICE — NITINOL WIRE STR 035

## (undated) DEVICE — 3M™ TEGADERM™ TRANSPARENT FILM DRESSING FRAME STYLE, 1624W, 2-3/8 IN X 2-3/4 IN (6 CM X 7 CM), 100/CT 4CT/CASE: Brand: 3M™ TEGADERM™

## (undated) DEVICE — GLOVE SUR 6.5 SENSICARE PI PIP CRM PWD F

## (undated) DEVICE — PACK PBDS CYSTOSCOPY

## (undated) DEVICE — SEAL BIOPSY PORT ACMI

## (undated) DEVICE — TIGERTAIL 5F FLXTIP 70CM

## (undated) DEVICE — KENDALL SCD EXPRESS SLEEVES, KNEE LENGTH, MEDIUM: Brand: KENDALL SCD

## (undated) DEVICE — FIBER LSR 365 MIC 365 DL DISP FOR HOLM LSR

## (undated) DEVICE — ADHESIVE MASTISOL 2/3CC VL

## (undated) DEVICE — SOL  .9 3000ML

## (undated) DEVICE — SYRINGE 20CC LL TIP

## (undated) DEVICE — GAMMEX® PI HYBRID SIZE 7.5, STERILE POWDER-FREE SURGICAL GLOVE, POLYISOPRENE AND NEOPRENE BLEND: Brand: GAMMEX

## (undated) DEVICE — SOLUTION IRRIG 3000ML 0.9% NACL FLX CONT

## (undated) DEVICE — 3M(TM) TEGADERM(TM) TRANSPARENT FILM DRESSING FRAME STYLE 9505W: Brand: 3M™ TEGADERM™

## (undated) NOTE — LETTER
[address]  [address]  Authorization for Surgical Operation and Procedure     Date:___________                                                                                                         Time:__________  I hereby authorize Surgeon(s):  Choco Gonzalez MD, my physician and his/her assistants (if applicable), which may include medical students, residents, and/or fellows, to perform the following surgical operation/ procedure and administer such anesthesia as may be determined necessary by my physician:  Operation/Procedure name (s) Procedure(s):  CYSTOSCOPY, RIGHT RETROGRADE,PYELOGRAM, URETEROSCOPY, LASER LITHOTRIPSY, STONE EXTRACTION, INSERTION RIGHT URETERAL STENT on Alonzo Quevedo   2.   I recognize that during the surgical operation/procedure, unforeseen conditions may necessitate additional or different procedures than those listed above.  I, therefore, further authorize and request that the above-named surgeon, assistants, or designees perform such procedures as are, in their judgment, necessary and desirable.    3.   My surgeon/physician has discussed prior to my surgery the potential benefits, risks and side effects of this procedure; the likelihood of achieving goals; and potential problems that might occur during recuperation.  They also discussed reasonable alternatives to the procedure, including risks, benefits, and side effects related to the alternatives and risks related to not receiving this procedure.  I have had all my questions answered and I acknowledge that no guarantee has been made as to the result that may be obtained.    4.   Should the need arise during my operation/procedure, which includes change of level of care prior to discharge, I also consent to the administration of blood and/or blood products.  Further, I understand that despite careful testing and screening of blood or blood products by collecting agencies, I may still be subject to  ill effects as a result of receiving a blood transfusion and/or blood products.  The following are some, but not all, of the potential risks that can occur: fever and allergic reactions, hemolytic reactions, transmission of diseases such as Hepatitis, AIDS and Cytomegalovirus (CMV) and fluid overload.  In the event that I wish to have an autologous transfusion of my own blood, or a directed donor transfusion, I will discuss this with my physician.  Check only if Refusing Blood or Blood Products  I understand refusal of blood or blood products as deemed necessary by my physician may have serious consequences to my condition to include possible death. I hereby assume responsibility for my refusal and release the hospital, its personnel, and my physicians from any responsibility for the consequences of my refusal.          o  Refuse      5.   I authorize the use of any specimen, organs, tissues, body parts or foreign objects that may be removed from my body during the operation/procedure for diagnosis, research or teaching purposes and their subsequent disposal by hospital authorities.  I also authorize the release of specimen test results and/or written reports to my treating physician on the hospital medical staff or other referring or consulting physicians involved in my care, at the discretion of the Pathologist or my treating physician.    6.   I consent to the photographing or videotaping of the operations or procedures to be performed, including appropriate portions of my body for medical, scientific, or educational purposes, provided my identity is not revealed by the pictures or by descriptive texts accompanying them.  If the procedure has been photographed/videotaped, the surgeon will obtain the original picture, image, videotape or CD.  The hospital will not be responsible for storage, release or maintenance of the picture, image, tape or CD.    7.   I consent to the presence of a  or  observers in the operating room as deemed necessary by my physician or their designees.    8.   I recognize that in the event my procedure results in extended X-Ray/fluoroscopy time, I may develop a skin reaction.    9. If I have a Do Not Attempt Resuscitation (DNAR) order in place, that status will be suspended while in the operating room, procedural suite, and during the recovery period unless otherwise explicitly stated by me (or a person authorized to consent on my behalf). The surgeon or my attending physician will determine when the applicable recovery period ends for purposes of reinstating the DNAR order.  10. Patients having a sterilization procedure: I understand that if the procedure is successful the results will be permanent and it will therefore be impossible for me to inseminate, conceive, or bear children.  I also understand that the procedure is intended to result in sterility, although the result has not been guaranteed.   11. I acknowledge that my physician has explained sedation/analgesia administration to me including the risk and benefits I consent to the administration of sedation/analgesia as may be necessary or desirable in the judgment of my physician.    I CERTIFY THAT I HAVE READ AND FULLY UNDERSTAND THE ABOVE CONSENT TO OPERATION and/or OTHER PROCEDURE.    _________________________________________  __________________________________  Signature of Patient     Signature of Responsible Person         ___________________________________         Printed Name of Responsible Person           _________________________________                 Relationship to Patient  _________________________________________  ______________________________  Signature of Witness          Date  Time      Patient Name: Alonzo Quevedo     : 3/21/1965                 Printed: May 10, 2024     Medical Record #: [id number]                     Page 1 of 2                                    [address]  [place]  [address]    Consent for Anesthesia    IAlonzo agree to be cared for by an anesthesiologist, who is specially trained to monitor me and give me medicine to put me to sleep or keep me comfortable during my procedure    I understand that my anesthesiologist is not an employee or agent of [facility] or [employer]. He or she works for [employer].    As the patient asking for anesthesia services, I agree to:  Allow the anesthesiologist (anesthesia doctor) to give me medicine and do additional procedures as necessary. Some examples are: Starting or using an “IV” to give me medicine, fluids or blood during my procedure, and having a breathing tube placed to help me breathe when I’m asleep (intubation). In the event that my heart stops working properly, I understand that my anesthesiologist will make every effort to sustain my life, unless otherwise directed by [facility] Do Not Resuscitate documents.  Tell my anesthesia doctor before my procedure:  If I am pregnant.  The last time that I ate or drank.  All of the medicines I take (including prescriptions, herbal supplements, and pills I can buy without a prescription (including street drugs/illegal medications). Failure to inform my anesthesiologist about these medicines may increase my risk of anesthetic complications.  If I am allergic to anything or have had a reaction to anesthesia before.  I understand how the anesthesia medicine will help me (benefits).  I understand that with any type of anesthesia medicine there are risks:  The most common risks are: nausea, vomiting, sore throat, muscle soreness, damage to my eyes, mouth, or teeth (from breathing tube placement).  Rare risks include: remembering what happened during my procedure, allergic reactions to medications, injury to my airway, heart, lungs, vision, nerves, or muscles and in extremely rare instances death.  My doctor has explained  to me other choices available to me for my care (alternatives).  Pregnant Patients (“epidural”):  I understand that the risks of having an epidural (medicine given into my back to help control pain during labor), include itching, low blood pressure, difficulty urinating, headache or slowing of the baby’s heart. Very rare risks include infection, bleeding, seizure, irregular heart rhythms and nerve injury.  [facility] (“spinal”, “epidural”, & “nerve blocks”):  I understand that rare but potential complications include headache, bleeding, infection, seizure, irregular heart rhythms, and nerve injury.    I can change my mind about having anesthesia services at any time before I get the medicine.    _____________________________________________________________________________  Patient (or Representative) Signature/Relationship to Patient  Date   Time    _____________________________________________________________________________   Name (if used)    Language/Organization   Time    _____________________________________________________________________________  Anesthesiologist Signature     Date   Time  I have discussed the procedure and information above with the patient (or patient’s representative) and answered their questions. The patient or their representative has agreed to have anesthesia services.    _____________________________________________________________________________  Witness        Date   Time  I have verified that the signature is that of the patient or patient’s representative, and that it was signed before the procedure  Patient Name: Alonzo Quevedo     : 3/21/1965                 Printed: May 10, 2024     Medical Record #: [id number]                     Page 2 of 2

## (undated) NOTE — ED AVS SNAPSHOT
Henry Poole   MRN: [id number]    Department:  [facility] Emergency Department   Date of Visit:  7/31/2019           Disclosure     Insurance plans vary and the physician(s) referred by the ER may not be covered by your plan.  Please contact your tell this physician (or your personal doctor if your instructions are to return to your personal doctor) about any new or lasting problems. The primary care or specialist physician will see patients referred from the [facility] Emergency Department.  Donell Pascual

## (undated) NOTE — ED AVS SNAPSHOT
[facility] Emergency Department    [place]  [address] [place] [address]    Phone:  [phone]    Fax:  [phone]   MRN: [id number]    Department:  [facility] Emergency Department   Date of Visit:  6/12/ IF THERE IS ANY CHANGE OR WORSENING OF YOUR CONDITION, CALL YOUR PRIMARY CARE PHYSICIAN AT ONCE OR RETURN IMMEDIATELY TO THE EMERGENCY DEPARTMENT.     If you have been prescribed any medication(s), please fill your prescription right away and begin taking t

## (undated) NOTE — ED AVS SNAPSHOT
Naveed Golden   MRN: [id number]    Department:  [facility] Emergency Department   Date of Visit:  12/29/2018           Disclosure     Insurance plans vary and the physician(s) referred by the ER may not be covered by your plan.  Please contact you tell this physician (or your personal doctor if your instructions are to return to your personal doctor) about any new or lasting problems. The primary care or specialist physician will see patients referred from the [facility] Emergency Department.  Thomas Michele

## (undated) NOTE — ED AVS SNAPSHOT
[facility] Emergency Department    [facility] [address]  [address] [address]    Phone:  [phone]    Fax:  [phone]   MRN: [id number]    Department:  [facility] Emergency Department   Date of Visit:  4/14/ [place] diet advance as tolerated. Return if pains worsen or you develop fever vomiting or new complaints.     Discharge References/Attachments     ADENITIS, MESENTERIC (ENGLISH)    ABDOMINAL PAIN, UNKNOWN CAUSE, (MALE) (ENGLISH)      Disclosure     Insuranc IF THERE IS ANY CHANGE OR WORSENING OF YOUR CONDITION, CALL YOUR PRIMARY CARE PHYSICIAN AT ONCE OR RETURN IMMEDIATELY TO THE EMERGENCY DEPARTMENT.     If you have been prescribed any medication(s), please fill your prescription right away and begin taking t Patient [address] to help you get signed up for insurance coverage. Patient [address] is a Federal Navigator program that can help with your Affordable Care Act coverage, as well as all types of Medicaid plans.   To get signed up and covere BILIARY:  No visible dilatation or calcification. PANCREAS:  No lesion, fluid collection, ductal dilatation, or atrophy. SPLEEN:  No enlargement or focal lesion. AORTA/VASCULAR:  No aneurysm. RETROPERITONEUM:  No mass or adenopathy.     BOWEL/ME

## (undated) NOTE — ED AVS SNAPSHOT
Lexis Wheeler   MRN: [id number]    Department:  [facility] Emergency Department   Date of Visit:  7/30/2018           Disclosure     Insurance plans vary and the physician(s) referred by the ER may not be covered by your plan.  Please contact your tell this physician (or your personal doctor if your instructions are to return to your personal doctor) about any new or lasting problems. The primary care or specialist physician will see patients referred from the [facility] Emergency Department.  Amarilys Cabral

## (undated) NOTE — ED AVS SNAPSHOT
[facility] Emergency Department    [place]  [address] [address]    Phone:  [phone]    Fax:  [phone]   MRN: [id number]    Department:  [facility] Emergency Department   Date of Visit:  4/14/ IF THERE IS ANY CHANGE OR WORSENING OF YOUR CONDITION, CALL YOUR PRIMARY CARE PHYSICIAN AT ONCE OR RETURN IMMEDIATELY TO THE EMERGENCY DEPARTMENT.     If you have been prescribed any medication(s), please fill your prescription right away and begin taking t

## (undated) NOTE — ED AVS SNAPSHOT
[facility]   MRN: [id number]    Department:  [facility] Emergency Department   Date of Visit:  8/13/2017           Disclosure     Insurance plans vary and the physician(s) referred by the ER may not be covered by your plan.  Please contact your If you have been prescribed any medication(s), please fill your prescription right away and begin taking the medication(s) as directed    If the emergency physician has read X-rays, these will be re-interpreted by a radiologist.  If there is a significant

## (undated) NOTE — ED AVS SNAPSHOT
[facility] Emergency Department    [place]  [address] [address]    Phone:  [phone]    Fax:  [phone]   MRN: [id number]    Department:  [facility] Emergency Department   Date of Visit:  6/12/ You can get these medications from any pharmacy     Bring a paper prescription for each of these medications    - HYDROcodone-acetaminophen 5-325 MG Tabs              Discharge Instructions       Follow-up for further evaluation with primary physician.   Re return to your personal doctor) about any new or lasting problems. The primary care or specialist physician will see patients referred from the [facility] Emergency Department. Follow-up care is at the discretion of that Physician.     IF THERE IS ANY - If you have concerns related to behavioral health issues or thoughts of harming yourself, contact [facility] at [phone].     - If you don’t have insurance, Kris Luna has partnered with [facility] [place] [employer] below the urinary bladder. 2D rendering are generated on the CT scanner workstation to localize potential stones in the cranio-caudal plane. Dose reduction techniques were used.    Dose information is transmitted to the [facility] [employer] of Radiology) There is gas and stool seen within the colon. There is diverticulosis in the colon especially in the   sigmoid colon and descending colon. No evidence for acute diverticulitis. There is a normal-appearing appendix.  No evidence for small bowel obstruction o measuring up to 0.8 cm. Left kidney calculi seen on the recent CT scan are not well-defined sonographically. Right kidney 11.8 and left 11.5 cm in bipolar dimensions. AORTA/IVC:  No abdominal aortic aneurysm.              MyChart     Call the helpdesk fo

## (undated) NOTE — LETTER
Anay Portillo [address] [facility]    Consent for Operation  Date: ___August 2 , 2019_______________                                Time: ______1700_________    1.  I authorize the performance upon Jaylin Courtney the following ope revealed by the pictures or by descriptive texts accompanying them. If the procedure has been videotaped, the surgeon will obtain the original videotape. The hospital will not be responsible for storage or maintenance of this tape.   7. For the purpose of a THAT MY DOCTOR PROVIDED ME WITH THE ABOVE EXPLANATIONS, THAT ALL BLANKS OR STATEMENTS REQUIRING INSERTION OR COMPLETION WERE FILLED IN.     Signature of Patient:   ___________________________    When the patient is a minor or mentally incompetent to give co iii. All of the medicines I take (including prescriptions, herbal supplements, and pills I can buy without a prescription (including street drugs/illegal medications).  Failure to inform my anesthesiologist about these medicines may increase my risk of anes _____________________________________________________________________________  Anesthesiologist Signature     Date   Time  I have discussed the procedure and information above with the patient (or patient’s representative) and answered their questions.  The

## (undated) NOTE — LETTER
09/23/18    Alonzo Quevedo      To Whom It May Concern:     This letter has been written at the patient's request. The above patient was seen at [facility] for treatment of a medical condition from 9/22/18 to 9/23/18    The patient may return to work o